# Patient Record
Sex: FEMALE | Race: WHITE | Employment: OTHER | URBAN - METROPOLITAN AREA
[De-identification: names, ages, dates, MRNs, and addresses within clinical notes are randomized per-mention and may not be internally consistent; named-entity substitution may affect disease eponyms.]

---

## 2018-02-17 ENCOUNTER — APPOINTMENT (OUTPATIENT)
Dept: GENERAL RADIOLOGY | Age: 73
End: 2018-02-17
Attending: EMERGENCY MEDICINE
Payer: MEDICARE

## 2018-02-17 ENCOUNTER — HOSPITAL ENCOUNTER (EMERGENCY)
Age: 73
Discharge: HOME | End: 2018-02-17
Attending: EMERGENCY MEDICINE
Payer: MEDICARE

## 2018-02-17 VITALS
HEIGHT: 63 IN | SYSTOLIC BLOOD PRESSURE: 147 MMHG | OXYGEN SATURATION: 97 % | BODY MASS INDEX: 39.92 KG/M2 | TEMPERATURE: 98.8 F | WEIGHT: 225.31 LBS | DIASTOLIC BLOOD PRESSURE: 65 MMHG | RESPIRATION RATE: 16 BRPM | HEART RATE: 104 BPM

## 2018-02-17 DIAGNOSIS — R68.89 SENSATION OF SWOLLEN THROAT: Primary | ICD-10-CM

## 2018-02-17 PROCEDURE — 70360 X-RAY EXAM OF NECK: CPT

## 2018-02-17 PROCEDURE — 99283 EMERGENCY DEPT VISIT LOW MDM: CPT

## 2018-02-17 PROCEDURE — 74011000250 HC RX REV CODE- 250: Performed by: EMERGENCY MEDICINE

## 2018-02-17 PROCEDURE — 94762 N-INVAS EAR/PLS OXIMTRY CONT: CPT

## 2018-02-17 PROCEDURE — 99284 EMERGENCY DEPT VISIT MOD MDM: CPT

## 2018-02-17 PROCEDURE — 74011250637 HC RX REV CODE- 250/637: Performed by: EMERGENCY MEDICINE

## 2018-02-17 RX ORDER — DEXAMETHASONE SODIUM PHOSPHATE 4 MG/ML
10 INJECTION, SOLUTION INTRA-ARTICULAR; INTRALESIONAL; INTRAMUSCULAR; INTRAVENOUS; SOFT TISSUE
Status: COMPLETED | OUTPATIENT
Start: 2018-02-17 | End: 2018-02-17

## 2018-02-17 RX ORDER — PREDNISONE 10 MG/1
30 TABLET ORAL DAILY
Qty: 9 TAB | Refills: 0 | Status: SHIPPED | OUTPATIENT
Start: 2018-02-17 | End: 2018-02-20

## 2018-02-17 RX ORDER — LIDOCAINE HYDROCHLORIDE 20 MG/ML
15 SOLUTION OROPHARYNGEAL
Status: COMPLETED | OUTPATIENT
Start: 2018-02-17 | End: 2018-02-17

## 2018-02-17 RX ADMIN — ALUMINUM HYDROXIDE AND MAGNESIUM HYDROXIDE 30 ML: 200; 200 SUSPENSION ORAL at 22:16

## 2018-02-17 RX ADMIN — DEXAMETHASONE SODIUM PHOSPHATE 10 MG: 4 INJECTION, SOLUTION INTRAMUSCULAR; INTRAVENOUS at 22:17

## 2018-02-17 RX ADMIN — LIDOCAINE HYDROCHLORIDE 15 ML: 20 SOLUTION ORAL; TOPICAL at 22:16

## 2018-02-18 NOTE — ED NOTES
Assumed care of pt. Per ED tech, pt refused to change into gown. Pt sitting on stretcher with daugher at bedside. Reports being started on cipro yesterday for UTI and has had 2 doses, one last night and one this morning. Reports son telling her that she is allergic to this drug. Pt reports accidentally consuming vinegar, by mistaking it for apple juice. Reports ever since consumption, her throat has been burning and feels swollen. Reports cough, which causes difficulty breathing.

## 2018-02-18 NOTE — DISCHARGE INSTRUCTIONS
Sore Throat: Care Instructions  Your Care Instructions    Infection by bacteria or a virus causes most sore throats. Cigarette smoke, dry air, air pollution, allergies, and yelling can also cause a sore throat. Sore throats can be painful and annoying. Fortunately, most sore throats go away on their own. If you have a bacterial infection, your doctor may prescribe antibiotics. Follow-up care is a key part of your treatment and safety. Be sure to make and go to all appointments, and call your doctor if you are having problems. It's also a good idea to know your test results and keep a list of the medicines you take. How can you care for yourself at home? · If your doctor prescribed antibiotics, take them as directed. Do not stop taking them just because you feel better. You need to take the full course of antibiotics. · Gargle with warm salt water once an hour to help reduce swelling and relieve discomfort. Use 1 teaspoon of salt mixed in 1 cup of warm water. · Take an over-the-counter pain medicine, such as acetaminophen (Tylenol), ibuprofen (Advil, Motrin), or naproxen (Aleve). Read and follow all instructions on the label. · Be careful when taking over-the-counter cold or flu medicines and Tylenol at the same time. Many of these medicines have acetaminophen, which is Tylenol. Read the labels to make sure that you are not taking more than the recommended dose. Too much acetaminophen (Tylenol) can be harmful. · Drink plenty of fluids. Fluids may help soothe an irritated throat. Hot fluids, such as tea or soup, may help decrease throat pain. · Use over-the-counter throat lozenges to soothe pain. Regular cough drops or hard candy may also help. These should not be given to young children because of the risk of choking. · Do not smoke or allow others to smoke around you. If you need help quitting, talk to your doctor about stop-smoking programs and medicines.  These can increase your chances of quitting for good.  · Use a vaporizer or humidifier to add moisture to your bedroom. Follow the directions for cleaning the machine. When should you call for help? Call your doctor now or seek immediate medical care if:  ? · You have new or worse trouble swallowing. ? · Your sore throat gets much worse on one side. ? Watch closely for changes in your health, and be sure to contact your doctor if you do not get better as expected. Where can you learn more? Go to http://sandro-karla.info/. Enter [phone] in the search box to learn more about \"Sore Throat: Care Instructions. \"  Current as of: May 12, 2017  Content Version: 11.4  © 2130-8396 [employer]. Care instructions adapted under license by [employer] (which disclaims liability or warranty for this information).  If you have questions about a medical condition or this instruction, always ask your healthcare professional. [address] any warranty or liability for your use of this information.

## 2018-02-18 NOTE — ED NOTES
Dr. Corrinne Barbone at bedside to provide discharge paperwork. Vital signs stable. Pt in no apparent distress at this time. Mental status at baseline. Ambulatory to waiting room with steady gate, discharge paperwork in hand. Accompanied by daughter.

## 2018-02-18 NOTE — ED PROVIDER NOTES
EMERGENCY DEPARTMENT HISTORY AND PHYSICAL EXAM      Date: 2/17/2018  Patient Name: Tangela Wagoner    History of Presenting Illness     Chief Complaint   Patient presents with    Other       History Provided By: Patient and Patient's Daughter    HPI: Tangela Wagoner is a 81 y.o. female, pmhx of GERD, breast cancer, and HTN, who presents herself w/ daughter to the ED c/o constant, aching sore throat since tonight (2/17/18). Pt's daughter states that her mother accidentally consumed vinegar by mistaking it for apple juice. Pt states her throat is in a constant \"burning sensation\" and feels swollen. Pt reports of associated cough, nausea, vomiting, and BL ear pain since onset of symptoms. Per daughter, pt started on Cipro yesterday (2/16/18) for an UTI. She has had one dose yesterday and one this morning. Pt specifically denies any recent fevers, chills, diarrhea, abd pain, CP, SOB, urinary sxs, changes in BM, or headache. PCP: Olga Grijalva MD    Allergies: Codeine, hydrocodone, morphine, penicillin  PMHx: Significant for GERD, breast cancer, HTN  PSHx: Significant for breast lumpectomy, hysterectomy   Social Hx: -tobacco (former), -EtOH , -Illicit Drugs     There are no other complaints, changes, or physical findings at this time. Current Outpatient Prescriptions   Medication Sig Dispense Refill    predniSONE (DELTASONE) 10 mg tablet Take 3 Tabs by mouth daily for 3 doses. 9 Tab 0    multivitamin (ONE A DAY) tablet Take 1 tablet by mouth daily.  omeprazole (PRILOSEC) 20 mg capsule TAKE ONE CAPSULE BY MOUTH DAILY 30 Cap 0    KLOR-CON M20 20 mEq tablet TAKE ONE TABLET BY MOUTH EVERY DAY 30 Tab 4    rosuvastatin (CRESTOR) 10 mg tablet Take 1 Tab by mouth daily. 30 Tab 4    lisinopril (PRINIVIL, ZESTRIL) 10 mg tablet Take 1 Tab by mouth daily. 30 Tab 1    polyethylene glycol (MIRALAX) 17 gram/dose powder Take 17 g by mouth two (2) times a day.  510 g 3    acetaminophen (TYLENOL EXTRA STRENGTH) 500 mg tablet Take  by mouth every six (6) hours as needed.  butalbital-acetaminophen-caffeine (FIORICET, ESGIC) -40 mg per tablet Take 1 Tab by mouth every six (6) hours as needed for Pain. 30 Tab 0    aspirin (ASPIRIN) 325 mg tablet Take 325 mg by mouth daily. Past History     Past Medical History:  Past Medical History:   Diagnosis Date    Cancer [facility])     had breast cancer     Colon polyps 10/1/2010    Diverticulosis 10/1/2010    GERD (gastroesophageal reflux disease)     Hypertension     Unspecified sleep apnea        Past Surgical History:  Past Surgical History:   Procedure Laterality Date   Crockett Brenham  1/22/2015         ENDOSCOPY, COLON, DIAGNOSTIC      polyps removed    HX BREAST LUMPECTOMY  2000    left breast - chemo,surgery, radiation (partial mastectomy)    HX CHOLECYSTECTOMY      HX HYSTERECTOMY  2000    TAHBSO due to ?cancer - MCV    NE COLSC FLX W/RMVL OF TUMOR POLYP LESION SNARE TQ  3/8/2012            Family History:  Family History   Problem Relation Age of Onset    Cancer Mother      bone    Hypertension Mother     Diabetes Sister     Cancer Sister      pancreatic?  Diabetes Brother     Hypertension Father        Social History:  Social History   Substance Use Topics    Smoking status: Former Smoker     Packs/day: 0.00     Years: 20.00     Quit date: 10/1/2000    Smokeless tobacco: Never Used    Alcohol use No       Allergies: Allergies   Allergen Reactions    Codeine Nausea and Vomiting    Contrast Agent [Iodine] Hives and Itching    Hydrocodone Nausea and Vomiting    Morphine Other (comments)     hallucinations          Review of Systems   Review of Systems   Constitutional: Negative. Negative for fever. HENT: Positive for ear pain (BL) and sore throat.         +swollen throat   Eyes: Negative. Respiratory: Positive for cough. Negative for shortness of breath. Cardiovascular: Negative for chest pain. Gastrointestinal: Positive for nausea and vomiting. Negative for abdominal pain. Endocrine: Negative. Genitourinary: Negative. Negative for difficulty urinating, dysuria and hematuria. Musculoskeletal: Negative. Skin: Negative. Allergic/Immunologic: Negative. Neurological: Negative. Psychiatric/Behavioral: Negative for suicidal ideas. All other systems reviewed and are negative. Physical Exam   Physical Exam   Constitutional: She is oriented to person, place, and time. She appears well-developed and well-nourished. No distress. HENT:   Head: Normocephalic and atraumatic. Nose: Nose normal.   Mouth/Throat: Uvula is midline, oropharynx is clear and moist and mucous membranes are normal. No trismus in the jaw. No uvula swelling. No oropharyngeal exudate, posterior oropharyngeal edema, posterior oropharyngeal erythema or tonsillar abscesses. Eyes: Conjunctivae and EOM are normal. Pupils are equal, round, and reactive to light. No scleral icterus. Neck: Normal range of motion. No JVD present. No tracheal deviation present. Cardiovascular: Regular rhythm, normal heart sounds and intact distal pulses. Tachycardia present. Exam reveals no friction rub. No murmur heard. Pulmonary/Chest: Effort normal and breath sounds normal. No stridor. No respiratory distress. She has no wheezes. She has no rales. Abdominal: Soft. Bowel sounds are normal. She exhibits no distension. There is no tenderness. There is no rebound. Musculoskeletal: Normal range of motion. She exhibits no tenderness. Neurological: She is alert and oriented to person, place, and time. No cranial nerve deficit. Skin: Skin is warm and dry. No rash noted. She is not diaphoretic. Psychiatric: Her speech is normal and behavior is normal. Judgment and thought content normal. Her mood appears anxious. Cognition and memory are normal.   Nursing note and vitals reviewed.         Diagnostic Study Results     Radiologic Studies -   XR NECK SOFT TISSUE   Final Result   INDICATION: Swelling of the throat. Coughing. Swallowed vinegar. Allergic to  Cipro.     COMPARISON: None     AP and lateral views of the soft tissues of the neck demonstrate the epiglottis  to be normal. Aryepiglottic folds are normal. Prevertebral soft tissues are  normal. Tracheal air shadow is unremarkable. Adenoids are normal size.     IMPRESSION  IMPRESSION:  Normal neck for soft tissues. Medical Decision Making   I am the first provider for this patient. I reviewed the vital signs, available nursing notes, past medical history, past surgical history, family history and social history. Vital Signs-Reviewed the patient's vital signs. Patient Vitals for the past 12 hrs:   Temp Pulse Resp BP SpO2   02/17/18 2121 98.8 °F (37.1 °C) (!) 104 16 147/65 97 %       Pulse Oximetry Analysis - 97% on RA    Cardiac Monitor:   Rate: 104 bpm  Rhythm: Sinus Tachycardia      Records Reviewed: Nursing Notes and Old Medical Records    Provider Notes (Medical Decision Making):     DDX:  Throat irritation, sensation of throat closing    Plan:  Viscous lidocaine, decadron, maalox    Impression:  Sore throat, sensation of throat swelling    ED Course:   Initial assessment performed. The patients presenting problems have been discussed, and they are in agreement with the care plan formulated and outlined with them. I have encouraged them to ask questions as they arise throughout their visit. I reviewed our electronic medical record system for any past medical records that were available that may contribute to the patients current condition, the nursing notes and and vital signs from today's visit    Nursing notes will be reviewed as they become available in realtime while the pt has been in the ED. Serge Valdovinos MD    I personally reviewed pt's imaging. Official read by radiology listed below.   Serge Valdovinos MD    PROGRESS NOTE:  10:32 PM  Per nursing, pt is feeling much better after receiving medication. Continued to refuse to get into a gown. She and her daughter expressed her desire to go home. Written by Mickey Pina, ED Scribe, as dictated by Irasema Paniagua MD      Progress note:  Pt noted to be feeling better, ready for discharge. Discussed lab and imaging findings with pt and/or family, specifically noting negative xrays. Pt will follow up as instructed. All questions have been answered, pt voiced understanding and agreement with plan. If narcotics were prescribed, pt was advised not to drive or operate heavy machinery. If abx were prescribed, pt advised that diarrhea and rash are possible side effects of the medications. Specific return precautions provided in addition to instructions for pt to return to the ED immediately should sx worsen at any time. Irasema Paniagua MD      Critical Care Time: 0 min    PLAN:  1. Current Discharge Medication List      START taking these medications    Details   predniSONE (DELTASONE) 10 mg tablet Take 3 Tabs by mouth daily for 3 doses. Qty: 9 Tab, Refills: 0           2. Follow-up Information     Follow up With Details Comments [address] MD Schedule an appointment as soon as possible for a visit in 2 days  [facility] [address]  [phone]          Return to ED if worse     Discharge Note:  10:45 PM  The pt is ready for discharge. The pt's signs, symptoms, diagnosis, and discharge instructions have been discussed and pt has conveyed their understanding. The pt is to follow up as recommended or return to ER should their symptoms worsen. Plan has been discussed and pt is in agreement. Diagnosis     Clinical Impression:   1. Sensation of swollen throat        Attestations:     This note is prepared by Mickey Pina, acting as Scribe for MD Irasema Giron MD : The scribe's documentation has been prepared under my direction and personally reviewed by me in its entirety. I confirm that the note above accurately reflects all work, treatment, procedures, and medical decision making performed by me.            This note will not be viewable in [facility]

## 2019-07-02 ENCOUNTER — APPOINTMENT (OUTPATIENT)
Dept: CT IMAGING | Age: 74
End: 2019-07-02
Attending: EMERGENCY MEDICINE
Payer: MEDICARE

## 2019-07-02 ENCOUNTER — HOSPITAL ENCOUNTER (EMERGENCY)
Age: 74
Discharge: HOME | End: 2019-07-02
Attending: EMERGENCY MEDICINE
Payer: MEDICARE

## 2019-07-02 VITALS
HEART RATE: 78 BPM | TEMPERATURE: 98.8 F | HEIGHT: 62 IN | BODY MASS INDEX: 40.73 KG/M2 | DIASTOLIC BLOOD PRESSURE: 71 MMHG | WEIGHT: 221.34 LBS | OXYGEN SATURATION: 98 % | SYSTOLIC BLOOD PRESSURE: 128 MMHG | RESPIRATION RATE: 16 BRPM

## 2019-07-02 DIAGNOSIS — R11.10 ACUTE VOMITING: ICD-10-CM

## 2019-07-02 DIAGNOSIS — N28.9 KIDNEY LESION: Primary | ICD-10-CM

## 2019-07-02 DIAGNOSIS — R10.9 ACUTE RIGHT FLANK PAIN: ICD-10-CM

## 2019-07-02 DIAGNOSIS — I10 ACCELERATED HYPERTENSION: ICD-10-CM

## 2019-07-02 LAB
ALBUMIN SERPL-MCNC: 3.7 G/DL (ref 3.5–5)
ALBUMIN/GLOB SERPL: 1.2 {RATIO} (ref 1.1–2.2)
ALP SERPL-CCNC: 60 U/L (ref 45–117)
ALT SERPL-CCNC: 32 U/L (ref 12–78)
ANION GAP SERPL CALC-SCNC: 5 MMOL/L (ref 5–15)
APPEARANCE UR: CLEAR
AST SERPL-CCNC: 22 U/L (ref 15–37)
BACTERIA URNS QL MICRO: NEGATIVE /HPF
BASOPHILS # BLD: 0 K/UL (ref 0–0.1)
BASOPHILS NFR BLD: 1 % (ref 0–1)
BILIRUB SERPL-MCNC: 0.5 MG/DL (ref 0.2–1)
BILIRUB UR QL: NEGATIVE
BUN SERPL-MCNC: 12 MG/DL (ref 6–20)
BUN/CREAT SERPL: 14 (ref 12–20)
CALCIUM SERPL-MCNC: 9.3 MG/DL (ref 8.5–10.1)
CHLORIDE SERPL-SCNC: 103 MMOL/L (ref 97–108)
CO2 SERPL-SCNC: 32 MMOL/L (ref 21–32)
COLOR UR: ABNORMAL
CREAT SERPL-MCNC: 0.85 MG/DL (ref 0.55–1.02)
DIFFERENTIAL METHOD BLD: ABNORMAL
EOSINOPHIL # BLD: 0.1 K/UL (ref 0–0.4)
EOSINOPHIL NFR BLD: 3 % (ref 0–7)
EPITH CASTS URNS QL MICRO: ABNORMAL /LPF
ERYTHROCYTE [DISTWIDTH] IN BLOOD BY AUTOMATED COUNT: 12.6 % (ref 11.5–14.5)
GLOBULIN SER CALC-MCNC: 3 G/DL (ref 2–4)
GLUCOSE SERPL-MCNC: 142 MG/DL (ref 65–100)
GLUCOSE UR STRIP.AUTO-MCNC: NEGATIVE MG/DL
HCT VFR BLD AUTO: 40.8 % (ref 35–47)
HGB BLD-MCNC: 13.6 G/DL (ref 11.5–16)
HGB UR QL STRIP: NEGATIVE
IMM GRANULOCYTES # BLD AUTO: 0 K/UL (ref 0–0.04)
IMM GRANULOCYTES NFR BLD AUTO: 0 % (ref 0–0.5)
KETONES UR QL STRIP.AUTO: NEGATIVE MG/DL
LEUKOCYTE ESTERASE UR QL STRIP.AUTO: ABNORMAL
LYMPHOCYTES # BLD: 1 K/UL (ref 0.8–3.5)
LYMPHOCYTES NFR BLD: 30 % (ref 12–49)
MCH RBC QN AUTO: 29.7 PG (ref 26–34)
MCHC RBC AUTO-ENTMCNC: 33.3 G/DL (ref 30–36.5)
MCV RBC AUTO: 89.1 FL (ref 80–99)
MONOCYTES # BLD: 0.3 K/UL (ref 0–1)
MONOCYTES NFR BLD: 8 % (ref 5–13)
NEUTS SEG # BLD: 1.9 K/UL (ref 1.8–8)
NEUTS SEG NFR BLD: 58 % (ref 32–75)
NITRITE UR QL STRIP.AUTO: NEGATIVE
NRBC # BLD: 0 K/UL (ref 0–0.01)
NRBC BLD-RTO: 0 PER 100 WBC
PH UR STRIP: 7 [PH] (ref 5–8)
PLATELET # BLD AUTO: 163 K/UL (ref 150–400)
PMV BLD AUTO: 10.7 FL (ref 8.9–12.9)
POTASSIUM SERPL-SCNC: 3.6 MMOL/L (ref 3.5–5.1)
PROT SERPL-MCNC: 6.7 G/DL (ref 6.4–8.2)
PROT UR STRIP-MCNC: NEGATIVE MG/DL
RBC # BLD AUTO: 4.58 M/UL (ref 3.8–5.2)
RBC #/AREA URNS HPF: ABNORMAL /HPF (ref 0–5)
SODIUM SERPL-SCNC: 140 MMOL/L (ref 136–145)
SP GR UR REFRACTOMETRY: 1.01 (ref 1–1.03)
UA: UC IF INDICATED,UAUC: ABNORMAL
UROBILINOGEN UR QL STRIP.AUTO: 0.2 EU/DL (ref 0.2–1)
WBC # BLD AUTO: 3.3 K/UL (ref 3.6–11)
WBC URNS QL MICRO: ABNORMAL /HPF (ref 0–4)

## 2019-07-02 PROCEDURE — 74011250636 HC RX REV CODE- 250/636: Performed by: EMERGENCY MEDICINE

## 2019-07-02 PROCEDURE — 96374 THER/PROPH/DIAG INJ IV PUSH: CPT

## 2019-07-02 PROCEDURE — 74176 CT ABD & PELVIS W/O CONTRAST: CPT

## 2019-07-02 PROCEDURE — 96375 TX/PRO/DX INJ NEW DRUG ADDON: CPT

## 2019-07-02 PROCEDURE — 85025 COMPLETE CBC W/AUTO DIFF WBC: CPT

## 2019-07-02 PROCEDURE — 99284 EMERGENCY DEPT VISIT MOD MDM: CPT

## 2019-07-02 PROCEDURE — 80053 COMPREHEN METABOLIC PANEL: CPT

## 2019-07-02 PROCEDURE — 36415 COLL VENOUS BLD VENIPUNCTURE: CPT

## 2019-07-02 PROCEDURE — 81001 URINALYSIS AUTO W/SCOPE: CPT

## 2019-07-02 RX ORDER — KETOROLAC TROMETHAMINE 30 MG/ML
15 INJECTION, SOLUTION INTRAMUSCULAR; INTRAVENOUS
Status: COMPLETED | OUTPATIENT
Start: 2019-07-02 | End: 2019-07-02

## 2019-07-02 RX ORDER — DICLOFENAC SODIUM 75 MG/1
75 TABLET, DELAYED RELEASE ORAL 2 TIMES DAILY
Qty: 20 TAB | Refills: 0 | Status: SHIPPED | OUTPATIENT
Start: 2019-07-02 | End: 2022-04-03

## 2019-07-02 RX ORDER — ONDANSETRON 2 MG/ML
4 INJECTION INTRAMUSCULAR; INTRAVENOUS
Status: COMPLETED | OUTPATIENT
Start: 2019-07-02 | End: 2019-07-02

## 2019-07-02 RX ORDER — ONDANSETRON 4 MG/1
4 TABLET, ORALLY DISINTEGRATING ORAL
Qty: 20 TAB | Refills: 0 | Status: SHIPPED | OUTPATIENT
Start: 2019-07-02 | End: 2022-04-03 | Stop reason: DRUGHIGH

## 2019-07-02 RX ADMIN — ONDANSETRON 4 MG: 2 INJECTION INTRAMUSCULAR; INTRAVENOUS at 12:53

## 2019-07-02 RX ADMIN — KETOROLAC TROMETHAMINE 15 MG: 30 INJECTION, SOLUTION INTRAMUSCULAR at 12:53

## 2019-11-11 ENCOUNTER — ANESTHESIA EVENT (OUTPATIENT)
Dept: ENDOSCOPY | Age: 74
End: 2019-11-11
Payer: MEDICARE

## 2019-11-12 ENCOUNTER — HOSPITAL ENCOUNTER (OUTPATIENT)
Age: 74
Setting detail: OUTPATIENT SURGERY
Discharge: HOME | End: 2019-11-12
Attending: INTERNAL MEDICINE | Admitting: INTERNAL MEDICINE
Payer: MEDICARE

## 2019-11-12 ENCOUNTER — ANESTHESIA (OUTPATIENT)
Dept: ENDOSCOPY | Age: 74
End: 2019-11-12
Payer: MEDICARE

## 2019-11-12 VITALS
RESPIRATION RATE: 16 BRPM | WEIGHT: 224.5 LBS | BODY MASS INDEX: 42.39 KG/M2 | HEIGHT: 61 IN | HEART RATE: 71 BPM | DIASTOLIC BLOOD PRESSURE: 60 MMHG | SYSTOLIC BLOOD PRESSURE: 144 MMHG | OXYGEN SATURATION: 99 % | TEMPERATURE: 97.6 F

## 2019-11-12 PROCEDURE — 74011250636 HC RX REV CODE- 250/636: Performed by: NURSE ANESTHETIST, CERTIFIED REGISTERED

## 2019-11-12 PROCEDURE — 77030013992 HC SNR POLYP ENDOSC BSC -B: Performed by: INTERNAL MEDICINE

## 2019-11-12 PROCEDURE — 88305 TISSUE EXAM BY PATHOLOGIST: CPT

## 2019-11-12 PROCEDURE — 74011250636 HC RX REV CODE- 250/636: Performed by: INTERNAL MEDICINE

## 2019-11-12 PROCEDURE — 76040000019: Performed by: INTERNAL MEDICINE

## 2019-11-12 PROCEDURE — 74011250637 HC RX REV CODE- 250/637: Performed by: INTERNAL MEDICINE

## 2019-11-12 PROCEDURE — 76060000031 HC ANESTHESIA FIRST 0.5 HR: Performed by: INTERNAL MEDICINE

## 2019-11-12 PROCEDURE — 74011000250 HC RX REV CODE- 250: Performed by: NURSE ANESTHETIST, CERTIFIED REGISTERED

## 2019-11-12 RX ORDER — FLUMAZENIL 0.1 MG/ML
0.2 INJECTION INTRAVENOUS
Status: DISCONTINUED | OUTPATIENT
Start: 2019-11-12 | End: 2019-11-12 | Stop reason: HOSPADM

## 2019-11-12 RX ORDER — FENTANYL CITRATE 50 UG/ML
25 INJECTION, SOLUTION INTRAMUSCULAR; INTRAVENOUS
Status: DISCONTINUED | OUTPATIENT
Start: 2019-11-12 | End: 2019-11-12 | Stop reason: HOSPADM

## 2019-11-12 RX ORDER — SODIUM CHLORIDE 0.9 % (FLUSH) 0.9 %
5-40 SYRINGE (ML) INJECTION AS NEEDED
Status: DISCONTINUED | OUTPATIENT
Start: 2019-11-12 | End: 2019-11-12 | Stop reason: HOSPADM

## 2019-11-12 RX ORDER — ATROPINE SULFATE 0.1 MG/ML
0.5 INJECTION INTRAVENOUS
Status: DISCONTINUED | OUTPATIENT
Start: 2019-11-12 | End: 2019-11-12 | Stop reason: HOSPADM

## 2019-11-12 RX ORDER — EPINEPHRINE 0.1 MG/ML
1 INJECTION INTRACARDIAC; INTRAVENOUS
Status: DISCONTINUED | OUTPATIENT
Start: 2019-11-12 | End: 2019-11-12 | Stop reason: HOSPADM

## 2019-11-12 RX ORDER — NALOXONE HYDROCHLORIDE 0.4 MG/ML
0.4 INJECTION, SOLUTION INTRAMUSCULAR; INTRAVENOUS; SUBCUTANEOUS
Status: DISCONTINUED | OUTPATIENT
Start: 2019-11-12 | End: 2019-11-12 | Stop reason: HOSPADM

## 2019-11-12 RX ORDER — MIDAZOLAM HYDROCHLORIDE 1 MG/ML
.25-5 INJECTION, SOLUTION INTRAMUSCULAR; INTRAVENOUS
Status: DISCONTINUED | OUTPATIENT
Start: 2019-11-12 | End: 2019-11-12 | Stop reason: HOSPADM

## 2019-11-12 RX ORDER — DEXTROMETHORPHAN/PSEUDOEPHED 2.5-7.5/.8
1.2 DROPS ORAL
Status: DISCONTINUED | OUTPATIENT
Start: 2019-11-12 | End: 2019-11-12 | Stop reason: HOSPADM

## 2019-11-12 RX ORDER — LIDOCAINE HYDROCHLORIDE 20 MG/ML
INJECTION, SOLUTION EPIDURAL; INFILTRATION; INTRACAUDAL; PERINEURAL AS NEEDED
Status: DISCONTINUED | OUTPATIENT
Start: 2019-11-12 | End: 2019-11-12 | Stop reason: HOSPADM

## 2019-11-12 RX ORDER — SODIUM CHLORIDE 0.9 % (FLUSH) 0.9 %
5-40 SYRINGE (ML) INJECTION EVERY 8 HOURS
Status: DISCONTINUED | OUTPATIENT
Start: 2019-11-12 | End: 2019-11-12 | Stop reason: HOSPADM

## 2019-11-12 RX ORDER — SODIUM CHLORIDE 9 MG/ML
75 INJECTION, SOLUTION INTRAVENOUS CONTINUOUS
Status: DISCONTINUED | OUTPATIENT
Start: 2019-11-12 | End: 2019-11-12 | Stop reason: HOSPADM

## 2019-11-12 RX ORDER — PROPOFOL 10 MG/ML
INJECTION, EMULSION INTRAVENOUS AS NEEDED
Status: DISCONTINUED | OUTPATIENT
Start: 2019-11-12 | End: 2019-11-12 | Stop reason: HOSPADM

## 2019-11-12 RX ADMIN — PROPOFOL 30 MG: 10 INJECTION, EMULSION INTRAVENOUS at 10:55

## 2019-11-12 RX ADMIN — SIMETHICONE 80 MG: 20 SUSPENSION/ DROPS ORAL at 10:56

## 2019-11-12 RX ADMIN — PROPOFOL 30 MG: 10 INJECTION, EMULSION INTRAVENOUS at 10:57

## 2019-11-12 RX ADMIN — PROPOFOL 70 MG: 10 INJECTION, EMULSION INTRAVENOUS at 10:46

## 2019-11-12 RX ADMIN — PROPOFOL 30 MG: 10 INJECTION, EMULSION INTRAVENOUS at 10:53

## 2019-11-12 RX ADMIN — PROPOFOL 30 MG: 10 INJECTION, EMULSION INTRAVENOUS at 11:00

## 2019-11-12 RX ADMIN — SODIUM CHLORIDE 75 ML/HR: 900 INJECTION, SOLUTION INTRAVENOUS at 10:22

## 2019-11-12 RX ADMIN — LIDOCAINE HYDROCHLORIDE 100 MG: 20 INJECTION, SOLUTION EPIDURAL; INFILTRATION; INTRACAUDAL; PERINEURAL at 10:46

## 2019-11-12 RX ADMIN — PROPOFOL 30 MG: 10 INJECTION, EMULSION INTRAVENOUS at 10:50

## 2019-11-12 RX ADMIN — PROPOFOL 20 MG: 10 INJECTION, EMULSION INTRAVENOUS at 10:48

## 2019-11-12 RX ADMIN — PROPOFOL 20 MG: 10 INJECTION, EMULSION INTRAVENOUS at 11:05

## 2019-11-12 NOTE — ROUTINE PROCESS
Gui Valdez 1945 
[id number] Situation: 
Verbal report received from: DELMY Wolfe Procedure: Procedure(s): 
COLONOSCOPY 
ENDOSCOPIC POLYPECTOMY Background: 
 
Preoperative diagnosis: LEFT LOWER QUADRANT PAIN, FAMILY HX OF CANCER OF GI TRACT, HX OF COLON POLYPS Postoperative diagnosis: Diverticulosis and colon polyps x 2 :  Dr. Quintin Cm Assistant(s): Endoscopy Technician-1: Patito Ramos Endoscopy RN-1: Gaby Velasquez RN Specimens:  
ID Type Source Tests Collected by Time Destination 1 : Cecum polyp Preservative Cecum  Saqib Claros MD 11/12/2019 1053 Pathology 2 : rectal polyp Preservative   Saqib Claros MD 11/12/2019 1103 Pathology H. Pylori  no Assessment: 
Intra-procedure medications Anesthesia gave intra-procedure sedation and medications, see anesthesia flow sheet yes Intravenous fluids: NS@ [place] Vital signs stable  yes Abdominal assessment: round and soft  yes Recommendation: 
Discharge patient per MD order   yes. Family or Hugh Farrow, son Permission to share finding with family or friend yes

## 2019-11-12 NOTE — ANESTHESIA POSTPROCEDURE EVALUATION
Procedure(s):  COLONOSCOPY  ENDOSCOPIC POLYPECTOMY. general, total IV anesthesia    Anesthesia Post Evaluation        Patient location during evaluation: PACU  Note status: Adequate. Level of consciousness: responsive to verbal stimuli and sleepy but conscious  Pain management: satisfactory to patient  Airway patency: patent  Anesthetic complications: no  Cardiovascular status: acceptable  Respiratory status: acceptable  Hydration status: acceptable  Comments: +Post-Anesthesia Evaluation and Assessment    Patient: Yeyo Graham MRN: [id number]  SSN: [id number]   YOB: 1945  Age: 72 y.o. Sex: female      Cardiovascular Function/Vital Signs    /60   Pulse 71   Temp 36.4 °C (97.6 °F)   Resp 16   Ht 5' 1\" (1.549 m)   Wt 101.8 kg (224 lb 8 oz)   SpO2 99%   Breastfeeding? No   BMI 42.42 kg/m²     Patient is status post Procedure(s):  COLONOSCOPY  ENDOSCOPIC POLYPECTOMY. Nausea/Vomiting: Controlled. Postoperative hydration reviewed and adequate. Pain:  Pain Scale 1: Numeric (0 - 10) (11/12/19 1135)  Pain Intensity 1: 0 (11/12/19 1135)   Managed. Neurological Status: At baseline. Mental Status and Level of Consciousness: Arousable. Pulmonary Status:   O2 Device: Room air (11/12/19 1135)   Adequate oxygenation and airway patent. Complications related to anesthesia: None    Post-anesthesia assessment completed. No concerns. Signed By: Silvia Palm MD    11/12/2019  Post anesthesia nausea and vomiting:  controlled      Vitals Value Taken Time   /60 11/12/2019 11:35 AM   Temp 36.4 °C (97.6 °F) 11/12/2019 11:18 AM   Pulse 72 11/12/2019 11:35 AM   Resp 16 11/12/2019 11:35 AM   SpO2 99 % 11/12/2019 11:35 AM   Vitals shown include unvalidated device data.

## 2019-11-12 NOTE — H&P
Gastroenterology Outpatient History and Physical    Patient: Stacy Saurav    Physician: Madeline Carr MD    Chief Complaint: LLQ pain. History of Present Illness: 79yo F with LLQ pain in setting of known diverticulosis. Infl[facility] reported in 2018 and [facility]. Note pers hx colon adenomatous polyp 2015 and fam hx CRC in son    History:  Past Medical History:   Diagnosis Date    Cancer [facility])     had breast cancer     Colon polyps 10/1/2010    Diverticulosis 10/1/2010    GERD (gastroesophageal reflux disease)     Hypertension     Unspecified sleep apnea       Past Surgical History:   Procedure Laterality Date   Elba Appl  2015         ENDOSCOPY, COLON, DIAGNOSTIC      polyps removed    HX BREAST LUMPECTOMY      left breast - chemo,surgery, radiation (partial mastectomy)    HX CHOLECYSTECTOMY      HX HYSTERECTOMY      TAHBSO due to ?cancer - [facility]    SC COLSC FLX W/RMVL OF TUMOR POLYP LESION SNARE TQ  3/8/2012           Social History     Socioeconomic History    Marital status:      Spouse name: Not on file    Number of children: Not on file    Years of education: Not on file    Highest education level: Not on file   Tobacco Use    Smoking status: Former Smoker     Packs/day: 0.00     Years: 20.00     Pack years: 0.00     Last attempt to quit: 10/1/2000     Years since quittin.1    Smokeless tobacco: Never Used   Substance and Sexual Activity    Alcohol use: No    Drug use: No      Family History   Problem Relation Age of Onset    Cancer Mother         bone    Hypertension Mother     Diabetes Sister     Cancer Sister         pancreatic?  Diabetes Brother     Hypertension Father       Patient Active Problem List   Diagnosis Code    Colon polyps K63.5    Diverticulosis K57.90    HTN (hypertension) I10    GERD (gastroesophageal reflux disease) K21.9    Dyslipidemia E78.5    Hypokalemia E87.6       Allergies:    Allergies   Allergen Reactions  Codeine Nausea and Vomiting    Contrast Agent [Iodine] Hives and Itching    Hydrocodone Nausea and Vomiting    Morphine Other (comments)     hallucinations      Medications:   Prior to Admission medications    Medication Sig Start Date End Date Taking? Authorizing Provider   diclofenac EC (VOLTAREN) 75 mg EC tablet Take 1 Tab by mouth two (2) times a day. 7/2/19  Yes Anil Bell MD   multivitamin (ONE A DAY) tablet Take 1 tablet by mouth daily. Yes Provider, Historical   omeprazole (PRILOSEC) 20 mg capsule TAKE ONE CAPSULE BY MOUTH DAILY 7/19/14  Yes Dre Vasquez MD   KLOR-CON M20 20 mEq tablet TAKE ONE TABLET BY MOUTH EVERY DAY 7/1/13  Yes Dre Vasquez MD   rosuvastatin (CRESTOR) 10 mg tablet Take 1 Tab by mouth daily. 6/3/13  Yes Dre Vasquez MD   lisinopril (PRINIVIL, ZESTRIL) 10 mg tablet Take 1 Tab by mouth daily. 6/3/13  Yes Dre Vasquez MD   polyethylene glycol [facility]) 17 gram/dose powder Take 17 g by mouth two (2) times a day. 4/16/13  Yes Dre Vasquez MD   acetaminophen (TYLENOL EXTRA STRENGTH) 500 mg tablet Take  by mouth every six (6) hours as needed. Yes Provider, Historical   butalbital-acetaminophen-caffeine (FIORICET, ESGIC) -40 mg per tablet Take 1 Tab by mouth every six (6) hours as needed for Pain. 7/17/12  Yes Dre Vasquez MD   aspirin (ASPIRIN) 325 mg tablet Take 325 mg by mouth daily. Yes Provider, Historical   ondansetron (ZOFRAN ODT) 4 mg disintegrating tablet Take 1 Tab by mouth every eight (8) hours as needed for Nausea. 7/2/19   Anli Bell MD     Physical Exam:   Vital Signs: Blood pressure 179/76, pulse 87, temperature 98.1 °F (36.7 °C), resp. rate 21, height 5' 1\" (1.549 m), weight 101.8 kg (224 lb 8 oz), SpO2 98 %, not currently breastfeeding.   General: well developed, well nourished   HEENT: unremarkable   Heart: regular rhythm no mumur    Lungs: clear   Abdominal:  benign   Neurological: unremarkable   Extremities: no edema     Findings/Diagnosis: LLQ pain  Plan of Care/Planned Procedure: colonoscopy with conscious/deep sedation    Signed:  Jonnie Menjivar MD 11/12/2019

## 2019-11-12 NOTE — PROCEDURES
NAME:  Larry Prabhakar   :   1945   MRN:   [id number]     Date/Time:  2019 11:10 AM    Colonoscopy Operative Report    Procedure Type:   Colonoscopy with polypectomy (cold snare), polypectomy (snare cautery)     Indications:     Abdominal pain, LLQ  Pre-operative Diagnosis: see indication above  Post-operative Diagnosis:  See findings below  :  Shilpi Moctezuma MD  Referring Provider: --Unknown, Provider    Exam:  Airway: clear, no airway problems anticipated  Heart: RRR, without gallops or rubs  Lungs: clear bilaterally without wheezes, crackles, or rhonchi  Abdomen: soft, nontender, nondistended, bowel sounds present  Mental Status: awake, alert and oriented to person, place and time    Sedation:  MAC anesthesia Propofol 260mg IV  Procedure Details:  After informed consent was obtained with all risks and benefits of procedure explained and preoperative exam completed, the patient was taken to the endoscopy suite and placed in the left lateral decubitus position. Upon sequential sedation as per above, a digital rectal exam was performed demonstrating internal hemorrhoids. The Olympus videocolonoscope  was inserted in the rectum and carefully advanced to the cecum, which was identified by the ileocecal valve and appendiceal orifice. The quality of preparation was adequate. The colonoscope was slowly withdrawn with careful evaluation between folds. Retroflexion in the rectum was completed demonstrating internal hemorrhoids. Findings:     -Sigmoid diverticulosis  -Single diminutive 2mm sessile polyp in cecum at 90cm; removed with cold snare  -Single 5mm sessile polyp in rectum at 15cm; removed with hot snare cautery  -Small grade 1 internal hemorrhoids    Specimen Removed:  #1 cecal polyp; #2 rectal polyp  Complications: None. EBL:  None.     Impression:    -Sigmoid diverticulosis  -Single diminutive 2mm sessile polyp in cecum at 90cm; removed with cold snare  -Single 5mm sessile polyp in rectum at 15cm; removed with hot snare cautery  -Small grade 1 internal hemorrhoids     Recommendations: --Await pathology. , -Repeat colonoscopy in 3 years given family history of colon cancer. . High fiber diet. Resume normal medication(s). You will receive a letter about the biopsy results in about 10 days. You may be asked to call your doctor's office for the results. Discharge Disposition:  Home in the company of a  when able to ambulate.     Kai Guerin MD

## 2019-11-12 NOTE — ANESTHESIA PREPROCEDURE EVALUATION
Anesthetic History   No history of anesthetic complications            Review of Systems / Medical History  Patient summary reviewed, nursing notes reviewed and pertinent labs reviewed    Pulmonary          Undiagnosed apnea and smoker      Comments: Former smoker   Neuro/Psych   Within defined limits           Cardiovascular    Hypertension: well controlled          Hyperlipidemia    Exercise tolerance: <4 METS     GI/Hepatic/Renal     GERD: well controlled           Endo/Other        Morbid obesity and cancer     Other Findings   Comments: Breast cancer  Colon polyps   Diverticulosis         Physical Exam    Airway  Mallampati: III  TM Distance: 4 - 6 cm  Neck ROM: normal range of motion   Mouth opening: Normal     Cardiovascular  Regular rate and rhythm,  S1 and S2 normal,  no murmur, click, rub, or gallop  Rhythm: regular  Rate: normal         Dental    Dentition: Upper partial plate and Poor dentition     Pulmonary      Decreased breath sounds: bibasilar           Abdominal  GI exam deferred       Other Findings            Anesthetic Plan    ASA: 3  Anesthesia type: general and total IV anesthesia          Induction: Intravenous  Anesthetic plan and risks discussed with: Patient

## 2019-11-12 NOTE — DISCHARGE INSTRUCTIONS
Surekha Benitez  [id number]  1945    COLON DISCHARGE INSTRUCTIONS  Discomfort:  Redness at IV site- apply warm compress to area; if redness or soreness persist- contact your physician  There may be a slight amount of blood passed from the rectum  Gaseous discomfort- walking, belching will help relieve any discomfort  You may not operate a vehicle for 12 hours  You may not engage in an occupation involving machinery or appliances for rest of today  You may not drink alcoholic beverages for at least 12 hours  Avoid making any critical decisions for at least 24 hour  DIET:   High fiber diet. - however -  remember your colon is empty and a heavy meal will produce gas. Avoid these foods:  vegetables, fried / greasy foods, carbonated drinks for today  MEDICATION:         ACTIVITY:  You may not resume your normal daily activities until tomorrow AM; it is recommended that you spend the remainder of the day resting -  avoid any strenuous activity. CALL M.D.   ANY SIGN OF:   Increasing pain, nausea, vomiting  Abdominal distension (swelling)  New increased bleeding (oral or rectal)  Fever (chills)  Pain in chest area  Bloody discharge from nose or mouth  Shortness of breath    IMPRESSION:  -Sigmoid diverticulosis  -Single diminutive 2mm sessile polyp in cecum at 90cm; removed with cold snare  -Single 5mm sessile polyp in rectum at 15cm; removed with hot snare cautery  -Small grade 1 internal hemorrhoids    Follow-up Instructions:   Call Dr. Roxanne Moise for the results of procedure / biopsy in 7-10 days  Telephone # [phone]  Repeat colonoscopy in 3 years given family history of colon cancer    Gail De La Rosa MD

## 2019-11-12 NOTE — PERIOP NOTES
Endoscope was pre-cleaned at the bedside immediately following procedure by Nereida Valverde    Anesthesia reports 260mg Propofol, 100mg Lidocaine and 700mL NS given during procedure. Received report from anesthesia staff on vital signs and status of patient. Lala Ramsey

## 2022-01-19 ENCOUNTER — APPOINTMENT (OUTPATIENT)
Dept: GENERAL RADIOLOGY | Age: 77
End: 2022-01-19
Attending: STUDENT IN AN ORGANIZED HEALTH CARE EDUCATION/TRAINING PROGRAM
Payer: MEDICARE

## 2022-01-19 ENCOUNTER — APPOINTMENT (OUTPATIENT)
Dept: GENERAL RADIOLOGY | Age: 77
End: 2022-01-19
Attending: INTERNAL MEDICINE
Payer: MEDICARE

## 2022-01-19 ENCOUNTER — HOSPITAL ENCOUNTER (OUTPATIENT)
Age: 77
Setting detail: OBSERVATION
Discharge: HOME HEALTH | End: 2022-01-22
Attending: STUDENT IN AN ORGANIZED HEALTH CARE EDUCATION/TRAINING PROGRAM | Admitting: INTERNAL MEDICINE
Payer: MEDICARE

## 2022-01-19 DIAGNOSIS — S42.441A CLOSED DISPLACED FRACTURE OF MEDIAL EPICONDYLE OF RIGHT HUMERUS, UNSPECIFIED FRACTURE MORPHOLOGY, INITIAL ENCOUNTER: ICD-10-CM

## 2022-01-19 DIAGNOSIS — M84.421A PATHOLOGICAL FRACTURE OF RIGHT HUMERUS, UNSPECIFIED PATHOLOGICAL CAUSE, INITIAL ENCOUNTER: Primary | ICD-10-CM

## 2022-01-19 PROBLEM — S42.301A RIGHT HUMERAL FRACTURE: Status: ACTIVE | Noted: 2022-01-19

## 2022-01-19 LAB
ABO + RH BLD: NORMAL
ALBUMIN SERPL-MCNC: 2.8 G/DL (ref 3.5–5)
ALBUMIN/GLOB SERPL: 0.7 {RATIO} (ref 1.1–2.2)
ALP SERPL-CCNC: 50 U/L (ref 45–117)
ALT SERPL-CCNC: 30 U/L (ref 12–78)
ANION GAP SERPL CALC-SCNC: 6 MMOL/L (ref 5–15)
AST SERPL W P-5'-P-CCNC: 33 U/L (ref 15–37)
BASOPHILS # BLD: 0 K/UL (ref 0–0.1)
BASOPHILS NFR BLD: 0 % (ref 0–1)
BILIRUB SERPL-MCNC: 0.5 MG/DL (ref 0.2–1)
BLOOD GROUP ANTIBODIES SERPL: NEGATIVE
BUN SERPL-MCNC: 19 MG/DL (ref 6–20)
BUN/CREAT SERPL: 22 (ref 12–20)
CA-I BLD-MCNC: 9.3 MG/DL (ref 8.5–10.1)
CHLORIDE SERPL-SCNC: 105 MMOL/L (ref 97–108)
CO2 SERPL-SCNC: 28 MMOL/L (ref 21–32)
CREAT SERPL-MCNC: 0.88 MG/DL (ref 0.55–1.02)
DIFFERENTIAL METHOD BLD: ABNORMAL
EOSINOPHIL # BLD: 0 K/UL (ref 0–0.4)
EOSINOPHIL NFR BLD: 0 % (ref 0–7)
ERYTHROCYTE [DISTWIDTH] IN BLOOD BY AUTOMATED COUNT: 18.1 % (ref 11.5–14.5)
GLOBULIN SER CALC-MCNC: 4 G/DL (ref 2–4)
GLUCOSE SERPL-MCNC: 132 MG/DL (ref 65–100)
HCT VFR BLD AUTO: 39.1 % (ref 35–47)
HGB BLD-MCNC: 12.5 G/DL (ref 11.5–16)
IMM GRANULOCYTES # BLD AUTO: 0 K/UL (ref 0–0.04)
IMM GRANULOCYTES NFR BLD AUTO: 0 % (ref 0–0.5)
INR PPP: 0.9 (ref 0.9–1.1)
LYMPHOCYTES # BLD: 0.5 K/UL (ref 0.8–3.5)
LYMPHOCYTES NFR BLD: 8 % (ref 12–49)
MCH RBC QN AUTO: 30.7 PG (ref 26–34)
MCHC RBC AUTO-ENTMCNC: 32 G/DL (ref 30–36.5)
MCV RBC AUTO: 96.1 FL (ref 80–99)
MONOCYTES # BLD: 0.5 K/UL (ref 0–1)
MONOCYTES NFR BLD: 8 % (ref 5–13)
NEUTS SEG # BLD: 4.8 K/UL (ref 1.8–8)
NEUTS SEG NFR BLD: 84 % (ref 32–75)
NRBC # BLD: 0 K/UL (ref 0–0.01)
NRBC BLD-RTO: 0 PER 100 WBC
PLATELET # BLD AUTO: 200 K/UL (ref 150–400)
PMV BLD AUTO: 10.5 FL (ref 8.9–12.9)
POTASSIUM SERPL-SCNC: 3.6 MMOL/L (ref 3.5–5.1)
PROT SERPL-MCNC: 6.8 G/DL (ref 6.4–8.2)
PROTHROMBIN TIME: 11.9 SEC (ref 11.9–14.7)
RBC # BLD AUTO: 4.07 M/UL (ref 3.8–5.2)
SODIUM SERPL-SCNC: 139 MMOL/L (ref 136–145)
SPECIMEN EXP DATE BLD: NORMAL
WBC # BLD AUTO: 5.8 K/UL (ref 3.6–11)

## 2022-01-19 PROCEDURE — 96376 TX/PRO/DX INJ SAME DRUG ADON: CPT

## 2022-01-19 PROCEDURE — 96374 THER/PROPH/DIAG INJ IV PUSH: CPT

## 2022-01-19 PROCEDURE — 85610 PROTHROMBIN TIME: CPT

## 2022-01-19 PROCEDURE — 85025 COMPLETE CBC W/AUTO DIFF WBC: CPT

## 2022-01-19 PROCEDURE — 71045 X-RAY EXAM CHEST 1 VIEW: CPT

## 2022-01-19 PROCEDURE — G0378 HOSPITAL OBSERVATION PER HR: HCPCS

## 2022-01-19 PROCEDURE — 86900 BLOOD TYPING SEROLOGIC ABO: CPT

## 2022-01-19 PROCEDURE — 96375 TX/PRO/DX INJ NEW DRUG ADDON: CPT

## 2022-01-19 PROCEDURE — 80053 COMPREHEN METABOLIC PANEL: CPT

## 2022-01-19 PROCEDURE — 99285 EMERGENCY DEPT VISIT HI MDM: CPT

## 2022-01-19 PROCEDURE — 74011250637 HC RX REV CODE- 250/637: Performed by: STUDENT IN AN ORGANIZED HEALTH CARE EDUCATION/TRAINING PROGRAM

## 2022-01-19 PROCEDURE — 74011250636 HC RX REV CODE- 250/636: Performed by: STUDENT IN AN ORGANIZED HEALTH CARE EDUCATION/TRAINING PROGRAM

## 2022-01-19 PROCEDURE — 74011250637 HC RX REV CODE- 250/637: Performed by: INTERNAL MEDICINE

## 2022-01-19 PROCEDURE — 36415 COLL VENOUS BLD VENIPUNCTURE: CPT

## 2022-01-19 PROCEDURE — 74011250636 HC RX REV CODE- 250/636: Performed by: INTERNAL MEDICINE

## 2022-01-19 PROCEDURE — 73030 X-RAY EXAM OF SHOULDER: CPT

## 2022-01-19 PROCEDURE — 73060 X-RAY EXAM OF HUMERUS: CPT

## 2022-01-19 PROCEDURE — 73090 X-RAY EXAM OF FOREARM: CPT

## 2022-01-19 PROCEDURE — 65270000029 HC RM PRIVATE

## 2022-01-19 RX ORDER — ONDANSETRON 2 MG/ML
4 INJECTION INTRAMUSCULAR; INTRAVENOUS
Status: DISCONTINUED | OUTPATIENT
Start: 2022-01-19 | End: 2022-01-22 | Stop reason: HOSPADM

## 2022-01-19 RX ORDER — ACETAMINOPHEN 650 MG/1
650 SUPPOSITORY RECTAL
Status: DISCONTINUED | OUTPATIENT
Start: 2022-01-19 | End: 2022-01-20

## 2022-01-19 RX ORDER — FENTANYL CITRATE 50 UG/ML
25 INJECTION, SOLUTION INTRAMUSCULAR; INTRAVENOUS
Status: DISPENSED | OUTPATIENT
Start: 2022-01-19 | End: 2022-01-20

## 2022-01-19 RX ORDER — OXYCODONE HYDROCHLORIDE 5 MG/1
5 TABLET ORAL
Status: DISCONTINUED | OUTPATIENT
Start: 2022-01-19 | End: 2022-01-22 | Stop reason: HOSPADM

## 2022-01-19 RX ORDER — POTASSIUM CHLORIDE 20 MEQ/1
20 TABLET, EXTENDED RELEASE ORAL DAILY
Status: DISCONTINUED | OUTPATIENT
Start: 2022-01-20 | End: 2022-01-22 | Stop reason: HOSPADM

## 2022-01-19 RX ORDER — LISINOPRIL 10 MG/1
10 TABLET ORAL DAILY
Status: DISCONTINUED | OUTPATIENT
Start: 2022-01-20 | End: 2022-01-22 | Stop reason: HOSPADM

## 2022-01-19 RX ORDER — MORPHINE SULFATE 4 MG/ML
4 INJECTION INTRAVENOUS ONCE
Status: COMPLETED | OUTPATIENT
Start: 2022-01-19 | End: 2022-01-19

## 2022-01-19 RX ORDER — KETOROLAC TROMETHAMINE 30 MG/ML
30 INJECTION, SOLUTION INTRAMUSCULAR; INTRAVENOUS
Status: COMPLETED | OUTPATIENT
Start: 2022-01-19 | End: 2022-01-19

## 2022-01-19 RX ORDER — METHOCARBAMOL 750 MG/1
1500 TABLET, FILM COATED ORAL ONCE
Status: COMPLETED | OUTPATIENT
Start: 2022-01-19 | End: 2022-01-19

## 2022-01-19 RX ORDER — ONDANSETRON 2 MG/ML
4 INJECTION INTRAMUSCULAR; INTRAVENOUS
Status: COMPLETED | OUTPATIENT
Start: 2022-01-19 | End: 2022-01-19

## 2022-01-19 RX ORDER — ATORVASTATIN CALCIUM 20 MG/1
20 TABLET, FILM COATED ORAL DAILY
Status: DISCONTINUED | OUTPATIENT
Start: 2022-01-20 | End: 2022-01-22 | Stop reason: HOSPADM

## 2022-01-19 RX ORDER — FENTANYL CITRATE 50 UG/ML
50 INJECTION, SOLUTION INTRAMUSCULAR; INTRAVENOUS
Status: COMPLETED | OUTPATIENT
Start: 2022-01-19 | End: 2022-01-19

## 2022-01-19 RX ORDER — ACETAMINOPHEN 325 MG/1
650 TABLET ORAL
Status: DISCONTINUED | OUTPATIENT
Start: 2022-01-19 | End: 2022-01-20

## 2022-01-19 RX ORDER — PANTOPRAZOLE SODIUM 20 MG/1
20 TABLET, DELAYED RELEASE ORAL DAILY
Status: DISCONTINUED | OUTPATIENT
Start: 2022-01-20 | End: 2022-01-22 | Stop reason: HOSPADM

## 2022-01-19 RX ORDER — POLYETHYLENE GLYCOL 3350 17 G/17G
17 POWDER, FOR SOLUTION ORAL DAILY PRN
Status: DISCONTINUED | OUTPATIENT
Start: 2022-01-19 | End: 2022-01-22 | Stop reason: HOSPADM

## 2022-01-19 RX ORDER — ENOXAPARIN SODIUM 100 MG/ML
40 INJECTION SUBCUTANEOUS DAILY
Status: DISCONTINUED | OUTPATIENT
Start: 2022-01-20 | End: 2022-01-21

## 2022-01-19 RX ORDER — SODIUM CHLORIDE 0.9 % (FLUSH) 0.9 %
5-40 SYRINGE (ML) INJECTION EVERY 8 HOURS
Status: DISCONTINUED | OUTPATIENT
Start: 2022-01-19 | End: 2022-01-22 | Stop reason: HOSPADM

## 2022-01-19 RX ORDER — ONDANSETRON 4 MG/1
4 TABLET, ORALLY DISINTEGRATING ORAL
Status: DISCONTINUED | OUTPATIENT
Start: 2022-01-19 | End: 2022-01-22 | Stop reason: HOSPADM

## 2022-01-19 RX ORDER — ASPIRIN 325 MG
325 TABLET ORAL DAILY
Status: DISCONTINUED | OUTPATIENT
Start: 2022-01-20 | End: 2022-01-22 | Stop reason: HOSPADM

## 2022-01-19 RX ORDER — SODIUM CHLORIDE 0.9 % (FLUSH) 0.9 %
5-40 SYRINGE (ML) INJECTION AS NEEDED
Status: DISCONTINUED | OUTPATIENT
Start: 2022-01-19 | End: 2022-01-22 | Stop reason: HOSPADM

## 2022-01-19 RX ADMIN — METHOCARBAMOL 1500 MG: 750 TABLET ORAL at 16:14

## 2022-01-19 RX ADMIN — MORPHINE SULFATE 4 MG: 4 INJECTION INTRAVENOUS at 16:14

## 2022-01-19 RX ADMIN — FENTANYL CITRATE 25 MCG: 50 INJECTION, SOLUTION INTRAMUSCULAR; INTRAVENOUS at 23:14

## 2022-01-19 RX ADMIN — FENTANYL CITRATE 50 MCG: 50 INJECTION, SOLUTION INTRAMUSCULAR; INTRAVENOUS at 17:16

## 2022-01-19 RX ADMIN — KETOROLAC TROMETHAMINE 30 MG: 30 INJECTION, SOLUTION INTRAMUSCULAR at 16:14

## 2022-01-19 RX ADMIN — ONDANSETRON 4 MG: 2 INJECTION INTRAMUSCULAR; INTRAVENOUS at 20:43

## 2022-01-19 RX ADMIN — ONDANSETRON 4 MG: 2 INJECTION INTRAMUSCULAR; INTRAVENOUS at 16:14

## 2022-01-19 RX ADMIN — OXYCODONE HYDROCHLORIDE 5 MG: 5 TABLET ORAL at 20:43

## 2022-01-19 NOTE — ED PROVIDER NOTES
EMERGENCY DEPARTMENT HISTORY AND PHYSICAL EXAM      Date: 1/19/2022  Patient Name: Alia Polo    History of Presenting Illness     Chief Complaint   Patient presents with    Arm Injury       HPI: Alia Polo, 78 y.o. female with a past medical history of hypertension, GERD, and breast cancer presenting with right arm pain. She was in the shower and was reaching up when she felt a pop in her shoulder and had severe pain in the right shoulder, arm, and forearm. She is unable to move her shoulder because of the severe pain. Denies any shortness of breath or chest pain. No trauma. Never had this pain before. Reports shooting pains down her hand. Has normal sensation and no tingling in her fingers. Normal movement of the fingers. No lightheaded, dizziness, or syncope. EMS gave her 150 of fentanyl without response. PCP: Danielle Rose MD    Current Outpatient Medications   Medication Sig Dispense Refill    diclofenac EC (VOLTAREN) 75 mg EC tablet Take 1 Tab by mouth two (2) times a day. 20 Tab 0    ondansetron (ZOFRAN ODT) 4 mg disintegrating tablet Take 1 Tab by mouth every eight (8) hours as needed for Nausea. 20 Tab 0    multivitamin (ONE A DAY) tablet Take 1 tablet by mouth daily.  omeprazole (PRILOSEC) 20 mg capsule TAKE ONE CAPSULE BY MOUTH DAILY 30 Cap 0    KLOR-CON M20 20 mEq tablet TAKE ONE TABLET BY MOUTH EVERY DAY 30 Tab 4    rosuvastatin (CRESTOR) 10 mg tablet Take 1 Tab by mouth daily. 30 Tab 4    lisinopril (PRINIVIL, ZESTRIL) 10 mg tablet Take 1 Tab by mouth daily. 30 Tab 1    polyethylene glycol (MIRALAX) 17 gram/dose powder Take 17 g by mouth two (2) times a day. 510 g 3    acetaminophen (TYLENOL EXTRA STRENGTH) 500 mg tablet Take  by mouth every six (6) hours as needed.  butalbital-acetaminophen-caffeine (FIORICET, ESGIC) -40 mg per tablet Take 1 Tab by mouth every six (6) hours as needed for Pain.  30 Tab 0    aspirin (ASPIRIN) 325 mg tablet Take 325 mg by mouth daily. Medical History   I reviewed the medical, surgical, family, and social history, as well as allergies:    Past Medical History:  Past Medical History:   Diagnosis Date    Cancer [facility])     had breast cancer     Colon polyps 10/1/2010    Diverticulosis 10/1/2010    GERD (gastroesophageal reflux disease)     Hypertension     Unspecified sleep apnea        Past Surgical History:  Past Surgical History:   Procedure Laterality Date    COLONOSCOPY N/A 2019    COLONOS[facility]a [facility]ino [facility] ENDOSCOPY    Logan Godinez  2015         Logan Godinez  2019         ENDOSCOPY, COLON, DIAGNOSTIC      polyps removed    HX BREAST LUMPECTOMY      left breast - chemo,surgery, radiation (partial mastectomy)    HX CHOLECYSTECTOMY      HX HYSTERECTOMY      TAHBSO due to ?cancer - MCV    [place] COLSC FLX W/RMVL OF TUMOR POLYP LESION SNARE TQ  3/8/2012            Family History:  Family History   Problem Relation Age of Onset    Cancer Mother         bone    Hypertension Mother     Diabetes Sister     Cancer Sister         pancreatic?  Diabetes Brother     Hypertension Father     Cancer Other        Social History:  Social History     Tobacco Use    Smoking status: Former Smoker     Packs/day: 0.00     Years: 20.00     Pack years: 0.00     Quit date: 10/1/2000     Years since quittin.3    Smokeless tobacco: Never Used   Substance Use Topics    Alcohol use: No    Drug use: No       Allergies: Allergies   Allergen Reactions    Codeine Nausea and Vomiting    Contrast Agent [Iodine] Hives and Itching    Hydrocodone Nausea and Vomiting    Morphine Other (comments)     hallucinations        Review of Systems     Review of Systems   All other systems reviewed and are negative. Physical Exam and Vital Signs   Vital Signs - Reviewed the patient's vital signs.     Patient Vitals for the past 12 hrs:   Temp Pulse Resp BP SpO2   01/19/22 1808  85 18 (!) 142/74 90 %   01/19/22 1537 98.1 °F (36.7 °C) 81 22 (!) 185/84 95 %       Physical Exam:    GENERAL: awake, alert, cooperative, not in distress  HEENT:  * Pupils equal, EOMI  * Head atraumatic  CV:  * regular rhythm  * warm and perfused extremities bilaterally  PULMONARY: Good air movement, no wheezes or crackles  ABDOMEN: soft, not distended, no guarding, not tenderness to palpation  : No suprapubic tenderness  EXTREMITIES/BACK: warm and perfused, no edema. Tenderness to palpation over the right shoulder, arm, and forearm. Normal strength and sensation of the hand. Normal pulses. SKIN: no rashes or signs of trauma  NEURO:  * Speech clear  * Moves U&LE to command      Medical Decision Making and ED Course   - I am the first and primary provider for this patient and am the primary provider of record. - I reviewed the vital signs, available nursing notes, past medical history, past surgical history, family history and social history. - Initial assessment performed. The patients presenting problems have been discussed, and the staff are in agreement with the care plan formulated and outlined with them. I have encouraged them to ask questions as they arise throughout their visit. - Available medical records, nursing notes, old EKGs, and EMS run sheets (if patient was EMS transported) were reviewed    MDM:   Patient is a 78 y.o. female presenting for right upper extremity pain after a popping sound that was atraumatic. Vitals reveal elevated blood pressure but the patient is in severe pain likely exacerbating her hypertension and physical exam reveals tenderness over the right shoulder, arm, and forearm. Based on the history, physical exam, risk factors, and vitals signs, differential includes: Pathologic fracture, bone mets, ligamentous injury, dislocation no concern for neurovascular compromise.       Results     Labs:  Recent Results (from the past 12 hour(s))   CBC WITH AUTOMATED DIFF    Collection Time: 01/19/22  5:27 PM   Result Value Ref Range    WBC 5.8 3.6 - 11.0 K/uL    RBC 4.07 3.80 - 5.20 M/uL    HGB 12.5 11.5 - 16.0 g/dL    HCT 39.1 35.0 - 47.0 %    MCV 96.1 80.0 - 99.0 FL    MCH 30.7 26.0 - 34.0 PG    MCHC 32.0 30.0 - 36.5 g/dL    RDW 18.1 (H) 11.5 - 14.5 %    PLATELET 438 616 - 766 K/uL    MPV 10.5 8.9 - 12.9 FL    NRBC 0.0 0.0  WBC    ABSOLUTE NRBC 0.00 0.00 - 0.01 K/uL    NEUTROPHILS 84 (H) 32 - 75 %    LYMPHOCYTES 8 (L) 12 - 49 %    MONOCYTES 8 5 - 13 %    EOSINOPHILS 0 0 - 7 %    BASOPHILS 0 0 - 1 %    IMMATURE GRANULOCYTES 0 0 - 0.5 %    ABS. NEUTROPHILS 4.8 1.8 - 8.0 K/UL    ABS. LYMPHOCYTES 0.5 (L) 0.8 - 3.5 K/UL    ABS. MONOCYTES 0.5 0.0 - 1.0 K/UL    ABS. EOSINOPHILS 0.0 0.0 - 0.4 K/UL    ABS. BASOPHILS 0.0 0.0 - 0.1 K/UL    ABS. IMM. GRANS. 0.0 0.00 - 0.04 K/UL    DF AUTOMATED     PROTHROMBIN TIME + INR    Collection Time: 01/19/22  5:27 PM   Result Value Ref Range    Prothrombin time 11.9 11.9 - 14.7 sec    INR 0.9 0.9 - 1.1     METABOLIC PANEL, COMPREHENSIVE    Collection Time: 01/19/22  5:27 PM   Result Value Ref Range    Sodium 139 136 - 145 mmol/L    Potassium 3.6 3.5 - 5.1 mmol/L    Chloride 105 97 - 108 mmol/L    CO2 28 21 - 32 mmol/L    Anion gap 6 5 - 15 mmol/L    Glucose 132 (H) 65 - 100 mg/dL    BUN 19 6 - 20 mg/dL    Creatinine 0.88 0.55 - 1.02 mg/dL    BUN/Creatinine ratio 22 (H) 12 - 20      GFR est AA >60 >60 ml/min/1.73m2    GFR est non-AA >60 >60 ml/min/1.73m2    Calcium 9.3 8.5 - 10.1 mg/dL    Bilirubin, total 0.5 0.2 - 1.0 mg/dL    AST (SGOT) 33 15 - 37 U/L    ALT (SGPT) 30 12 - 78 U/L    Alk.  phosphatase 50 45 - 117 U/L    Protein, total 6.8 6.4 - 8.2 g/dL    Albumin 2.8 (L) 3.5 - 5.0 g/dL    Globulin 4.0 2.0 - 4.0 g/dL    A-G Ratio 0.7 (L) 1.1 - 2.2         Radiologic Studies:  CT Results  (Last 48 hours)    None        CXR Results  (Last 48 hours)    None          Medications ordered:  Medications   morphine injection 4 mg (4 mg IntraVENous Given 1/19/22 1614)   ondansetron (ZOFRAN) injection 4 mg (4 mg IntraVENous Given 1/19/22 1614)   methocarbamoL (ROBAXIN) tablet 1,500 mg (1,500 mg Oral Given 1/19/22 1614)   ketorolac (TORADOL) injection 30 mg (30 mg IntraVENous Given 1/19/22 1614)   fentaNYL citrate (PF) injection 50 mcg (50 mcg IntraVENous Given 1/19/22 1716)        ED Course     ED Course:     ED Course as of 01/19/22 1924 Wed Jan 19, 2022   1721 XR: Probable pathologic, minimally displaced fracture involving the distal third of the right humerus. [SS]   [address] [place] [address]  orthopedics. [SS]      ED Course User Index  [SS] Niki Kirby MD       Reassessment / Disposition / Discussion:    Will admit to floor. Final Disposition     Disposition: Condition stable  Admitted to Floor Medical Floor the case was discussed with the admitting physician     ADMISSION: After completion of ED workup and discussion of results and diagnoses with the patient, patient was admitted to the hospital. All the patient's questions were answered. Case was discussed with the receiving team.        Diagnosis     Clinical Impression:   1. Pathological fracture of right humerus, unspecified pathological cause, initial encounter        Attestations:    Jeffrey Lew MD    Please note that this dictation was completed with [employer], the computer voice recognition software. Quite often unanticipated grammatical, syntax, homophones, and other interpretive errors are inadvertently transcribed by the computer software. Please disregard these errors. Please excuse any errors that have escaped final proofreading. Thank you.

## 2022-01-19 NOTE — ED TRIAGE NOTES
Pt BIB EMS after right shoulder and arm injury.  Pt was trying to get out of bath today by pulling up with arm when she heard a pop in arm and shoulder  Pt received 150mcg total of fentanyl and 4mg of zofran PO by EMS

## 2022-01-19 NOTE — ED NOTES
Spoke to patient's granddaughter, she's aware of the situation and will await more updates, states that if patient gets sent home, they will come pick her up, pt updated.

## 2022-01-20 ENCOUNTER — APPOINTMENT (OUTPATIENT)
Dept: GENERAL RADIOLOGY | Age: 77
End: 2022-01-20
Attending: ORTHOPAEDIC SURGERY
Payer: MEDICARE

## 2022-01-20 ENCOUNTER — ANESTHESIA (OUTPATIENT)
Dept: SURGERY | Age: 77
End: 2022-01-20
Payer: MEDICARE

## 2022-01-20 ENCOUNTER — ANESTHESIA EVENT (OUTPATIENT)
Dept: SURGERY | Age: 77
End: 2022-01-20
Payer: MEDICARE

## 2022-01-20 LAB
ANION GAP SERPL CALC-SCNC: 7 MMOL/L (ref 5–15)
BUN SERPL-MCNC: 21 MG/DL (ref 6–20)
BUN/CREAT SERPL: 23 (ref 12–20)
CA-I BLD-MCNC: 9.5 MG/DL (ref 8.5–10.1)
CHLORIDE SERPL-SCNC: 104 MMOL/L (ref 97–108)
CO2 SERPL-SCNC: 29 MMOL/L (ref 21–32)
COVID-19 RAPID TEST, COVR: NOT DETECTED
CREAT SERPL-MCNC: 0.91 MG/DL (ref 0.55–1.02)
ERYTHROCYTE [DISTWIDTH] IN BLOOD BY AUTOMATED COUNT: 13.4 % (ref 11.5–14.5)
GLUCOSE SERPL-MCNC: 126 MG/DL (ref 65–100)
HCT VFR BLD AUTO: 34.1 % (ref 35–47)
HGB BLD-MCNC: 11.2 G/DL (ref 11.5–16)
MCH RBC QN AUTO: 30.1 PG (ref 26–34)
MCHC RBC AUTO-ENTMCNC: 32.8 G/DL (ref 30–36.5)
MCV RBC AUTO: 91.7 FL (ref 80–99)
NRBC # BLD: 0 K/UL (ref 0–0.01)
NRBC BLD-RTO: 0 PER 100 WBC
PLATELET # BLD AUTO: 172 K/UL (ref 150–400)
PMV BLD AUTO: 10.2 FL (ref 8.9–12.9)
POTASSIUM SERPL-SCNC: 3.5 MMOL/L (ref 3.5–5.1)
RBC # BLD AUTO: 3.72 M/UL (ref 3.8–5.2)
SODIUM SERPL-SCNC: 140 MMOL/L (ref 136–145)
SPECIMEN SOURCE: NORMAL
WBC # BLD AUTO: 3.5 K/UL (ref 3.6–11)

## 2022-01-20 PROCEDURE — 77030011992 HC AIRWY NASOPHGL TELE -A: Performed by: NURSE ANESTHETIST, CERTIFIED REGISTERED

## 2022-01-20 PROCEDURE — 88341 IMHCHEM/IMCYTCHM EA ADD ANTB: CPT

## 2022-01-20 PROCEDURE — 74011250636 HC RX REV CODE- 250/636: Performed by: NURSE ANESTHETIST, CERTIFIED REGISTERED

## 2022-01-20 PROCEDURE — C1713 ANCHOR/SCREW BN/BN,TIS/BN: HCPCS | Performed by: ORTHOPAEDIC SURGERY

## 2022-01-20 PROCEDURE — 87186 SC STD MICRODIL/AGAR DIL: CPT

## 2022-01-20 PROCEDURE — G0378 HOSPITAL OBSERVATION PER HR: HCPCS

## 2022-01-20 PROCEDURE — 77030040361 HC SLV COMPR DVT MDII -B: Performed by: ORTHOPAEDIC SURGERY

## 2022-01-20 PROCEDURE — 77030000031 HC BIT DRL QC SYNT -C: Performed by: ORTHOPAEDIC SURGERY

## 2022-01-20 PROCEDURE — 77030013079 HC BLNKT BAIR HGGR 3M -A: Performed by: NURSE ANESTHETIST, CERTIFIED REGISTERED

## 2022-01-20 PROCEDURE — 36415 COLL VENOUS BLD VENIPUNCTURE: CPT

## 2022-01-20 PROCEDURE — 77030016475 HC BIT DRL QC4 SYNT -C: Performed by: ORTHOPAEDIC SURGERY

## 2022-01-20 PROCEDURE — 74011000250 HC RX REV CODE- 250: Performed by: ORTHOPAEDIC SURGERY

## 2022-01-20 PROCEDURE — 77030005513 HC CATH URETH FOL11 MDII -B: Performed by: ORTHOPAEDIC SURGERY

## 2022-01-20 PROCEDURE — 96372 THER/PROPH/DIAG INJ SC/IM: CPT

## 2022-01-20 PROCEDURE — 85027 COMPLETE CBC AUTOMATED: CPT

## 2022-01-20 PROCEDURE — 74011250637 HC RX REV CODE- 250/637: Performed by: INTERNAL MEDICINE

## 2022-01-20 PROCEDURE — 2709999900 HC NON-CHARGEABLE SUPPLY: Performed by: ORTHOPAEDIC SURGERY

## 2022-01-20 PROCEDURE — 77030008684 HC TU ET CUF COVD -B: Performed by: NURSE ANESTHETIST, CERTIFIED REGISTERED

## 2022-01-20 PROCEDURE — 88365 INSITU HYBRIDIZATION (FISH): CPT

## 2022-01-20 PROCEDURE — 74011000250 HC RX REV CODE- 250: Performed by: NURSE ANESTHETIST, CERTIFIED REGISTERED

## 2022-01-20 PROCEDURE — 88311 DECALCIFY TISSUE: CPT

## 2022-01-20 PROCEDURE — 77030019908 HC STETH ESOPH SIMS -A: Performed by: NURSE ANESTHETIST, CERTIFIED REGISTERED

## 2022-01-20 PROCEDURE — 76060000036 HC ANESTHESIA 2.5 TO 3 HR: Performed by: ORTHOPAEDIC SURGERY

## 2022-01-20 PROCEDURE — 77030014405 HC GD ROD RMR SYNT -C: Performed by: ORTHOPAEDIC SURGERY

## 2022-01-20 PROCEDURE — 77030031139 HC SUT VCRL2 J&J -A: Performed by: ORTHOPAEDIC SURGERY

## 2022-01-20 PROCEDURE — 88364 INSITU HYBRIDIZATION (FISH): CPT

## 2022-01-20 PROCEDURE — 74011250636 HC RX REV CODE- 250/636: Performed by: INTERNAL MEDICINE

## 2022-01-20 PROCEDURE — 77010033678 HC OXYGEN DAILY

## 2022-01-20 PROCEDURE — 96376 TX/PRO/DX INJ SAME DRUG ADON: CPT

## 2022-01-20 PROCEDURE — 80048 BASIC METABOLIC PNL TOTAL CA: CPT

## 2022-01-20 PROCEDURE — 88305 TISSUE EXAM BY PATHOLOGIST: CPT

## 2022-01-20 PROCEDURE — 88307 TISSUE EXAM BY PATHOLOGIST: CPT

## 2022-01-20 PROCEDURE — 87077 CULTURE AEROBIC IDENTIFY: CPT

## 2022-01-20 PROCEDURE — 76210000063 HC OR PH I REC FIRST 0.5 HR: Performed by: ORTHOPAEDIC SURGERY

## 2022-01-20 PROCEDURE — 94760 N-INVAS EAR/PLS OXIMETRY 1: CPT

## 2022-01-20 PROCEDURE — 77030003481 HC NDL BIOP GUN BARD -B: Performed by: ORTHOPAEDIC SURGERY

## 2022-01-20 PROCEDURE — 74011000250 HC RX REV CODE- 250: Performed by: INTERNAL MEDICINE

## 2022-01-20 PROCEDURE — 76010000132 HC OR TIME 2.5 TO 3 HR: Performed by: ORTHOPAEDIC SURGERY

## 2022-01-20 PROCEDURE — 87086 URINE CULTURE/COLONY COUNT: CPT

## 2022-01-20 PROCEDURE — 88342 IMHCHEM/IMCYTCHM 1ST ANTB: CPT

## 2022-01-20 PROCEDURE — 74011250636 HC RX REV CODE- 250/636: Performed by: ORTHOPAEDIC SURGERY

## 2022-01-20 PROCEDURE — 77030026438 HC STYL ET INTUB CARD -A: Performed by: NURSE ANESTHETIST, CERTIFIED REGISTERED

## 2022-01-20 PROCEDURE — 87635 SARS-COV-2 COVID-19 AMP PRB: CPT

## 2022-01-20 PROCEDURE — 76000 FLUOROSCOPY <1 HR PHYS/QHP: CPT

## 2022-01-20 PROCEDURE — 74011250637 HC RX REV CODE- 250/637: Performed by: PHYSICIAN ASSISTANT

## 2022-01-20 PROCEDURE — 65270000029 HC RM PRIVATE

## 2022-01-20 DEVICE — IMPLANTABLE DEVICE: Type: IMPLANTABLE DEVICE | Site: HUMERUS | Status: FUNCTIONAL

## 2022-01-20 DEVICE — SCREW BNE L42MM DIA4.5MM G TI 6% ALUM 7% NIOBIUM FULL THRD: Type: IMPLANTABLE DEVICE | Site: HUMERUS | Status: FUNCTIONAL

## 2022-01-20 DEVICE — SCREW BNE L26MM DIA4MM TIB BLU TI ST CANN LOK FULL THRD T25: Type: IMPLANTABLE DEVICE | Site: HUMERUS | Status: FUNCTIONAL

## 2022-01-20 DEVICE — SCREW BNE L38MM DIA4.5MM COR DIA3.9MM G TI 6% ALUM 7%: Type: IMPLANTABLE DEVICE | Site: HUMERUS | Status: FUNCTIONAL

## 2022-01-20 RX ORDER — ACETAMINOPHEN 500 MG
1000 TABLET ORAL EVERY 6 HOURS
Status: DISCONTINUED | OUTPATIENT
Start: 2022-01-20 | End: 2022-01-22 | Stop reason: HOSPADM

## 2022-01-20 RX ORDER — DEXAMETHASONE SODIUM PHOSPHATE 4 MG/ML
INJECTION, SOLUTION INTRA-ARTICULAR; INTRALESIONAL; INTRAMUSCULAR; INTRAVENOUS; SOFT TISSUE AS NEEDED
Status: DISCONTINUED | OUTPATIENT
Start: 2022-01-20 | End: 2022-01-20 | Stop reason: HOSPADM

## 2022-01-20 RX ORDER — HYDROCODONE BITARTRATE AND ACETAMINOPHEN 5; 325 MG/1; MG/1
1 TABLET ORAL AS NEEDED
Status: DISCONTINUED | OUTPATIENT
Start: 2022-01-20 | End: 2022-01-20 | Stop reason: HOSPADM

## 2022-01-20 RX ORDER — HYDROMORPHONE HYDROCHLORIDE 1 MG/ML
INJECTION, SOLUTION INTRAMUSCULAR; INTRAVENOUS; SUBCUTANEOUS AS NEEDED
Status: DISCONTINUED | OUTPATIENT
Start: 2022-01-20 | End: 2022-01-20 | Stop reason: HOSPADM

## 2022-01-20 RX ORDER — ONDANSETRON 2 MG/ML
INJECTION INTRAMUSCULAR; INTRAVENOUS AS NEEDED
Status: DISCONTINUED | OUTPATIENT
Start: 2022-01-20 | End: 2022-01-20 | Stop reason: HOSPADM

## 2022-01-20 RX ORDER — PROPOFOL 10 MG/ML
INJECTION, EMULSION INTRAVENOUS AS NEEDED
Status: DISCONTINUED | OUTPATIENT
Start: 2022-01-20 | End: 2022-01-20 | Stop reason: HOSPADM

## 2022-01-20 RX ORDER — CEFAZOLIN SODIUM 1 G/3ML
INJECTION, POWDER, FOR SOLUTION INTRAMUSCULAR; INTRAVENOUS AS NEEDED
Status: DISCONTINUED | OUTPATIENT
Start: 2022-01-20 | End: 2022-01-20 | Stop reason: HOSPADM

## 2022-01-20 RX ORDER — CELECOXIB 200 MG/1
200 CAPSULE ORAL 2 TIMES DAILY
Status: DISCONTINUED | OUTPATIENT
Start: 2022-01-20 | End: 2022-01-22 | Stop reason: HOSPADM

## 2022-01-20 RX ORDER — ROCURONIUM BROMIDE 10 MG/ML
INJECTION, SOLUTION INTRAVENOUS AS NEEDED
Status: DISCONTINUED | OUTPATIENT
Start: 2022-01-20 | End: 2022-01-20 | Stop reason: HOSPADM

## 2022-01-20 RX ORDER — LIDOCAINE HYDROCHLORIDE 20 MG/ML
INJECTION, SOLUTION EPIDURAL; INFILTRATION; INTRACAUDAL; PERINEURAL AS NEEDED
Status: DISCONTINUED | OUTPATIENT
Start: 2022-01-20 | End: 2022-01-20 | Stop reason: HOSPADM

## 2022-01-20 RX ORDER — FENTANYL CITRATE 50 UG/ML
INJECTION, SOLUTION INTRAMUSCULAR; INTRAVENOUS AS NEEDED
Status: DISCONTINUED | OUTPATIENT
Start: 2022-01-20 | End: 2022-01-20 | Stop reason: HOSPADM

## 2022-01-20 RX ORDER — EPHEDRINE SULFATE/0.9% NACL/PF 50 MG/5 ML
5 SYRINGE (ML) INTRAVENOUS AS NEEDED
Status: DISCONTINUED | OUTPATIENT
Start: 2022-01-20 | End: 2022-01-20 | Stop reason: HOSPADM

## 2022-01-20 RX ORDER — SODIUM CHLORIDE 0.9 % (FLUSH) 0.9 %
5-40 SYRINGE (ML) INJECTION EVERY 8 HOURS
Status: DISCONTINUED | OUTPATIENT
Start: 2022-01-20 | End: 2022-01-20 | Stop reason: HOSPADM

## 2022-01-20 RX ORDER — SODIUM CHLORIDE 0.9 % (FLUSH) 0.9 %
5-40 SYRINGE (ML) INJECTION AS NEEDED
Status: DISCONTINUED | OUTPATIENT
Start: 2022-01-20 | End: 2022-01-20 | Stop reason: HOSPADM

## 2022-01-20 RX ORDER — SODIUM CHLORIDE, SODIUM LACTATE, POTASSIUM CHLORIDE, CALCIUM CHLORIDE 600; 310; 30; 20 MG/100ML; MG/100ML; MG/100ML; MG/100ML
INJECTION, SOLUTION INTRAVENOUS
Status: DISCONTINUED | OUTPATIENT
Start: 2022-01-20 | End: 2022-01-20 | Stop reason: HOSPADM

## 2022-01-20 RX ORDER — MORPHINE SULFATE 2 MG/ML
1 INJECTION, SOLUTION INTRAMUSCULAR; INTRAVENOUS
Status: DISCONTINUED | OUTPATIENT
Start: 2022-01-20 | End: 2022-01-22 | Stop reason: HOSPADM

## 2022-01-20 RX ORDER — ONDANSETRON 2 MG/ML
4 INJECTION INTRAMUSCULAR; INTRAVENOUS AS NEEDED
Status: DISCONTINUED | OUTPATIENT
Start: 2022-01-20 | End: 2022-01-20 | Stop reason: HOSPADM

## 2022-01-20 RX ORDER — FENTANYL CITRATE 50 UG/ML
50 INJECTION, SOLUTION INTRAMUSCULAR; INTRAVENOUS
Status: DISCONTINUED | OUTPATIENT
Start: 2022-01-20 | End: 2022-01-20 | Stop reason: HOSPADM

## 2022-01-20 RX ORDER — LIDOCAINE HYDROCHLORIDE 10 MG/ML
0.1 INJECTION, SOLUTION EPIDURAL; INFILTRATION; INTRACAUDAL; PERINEURAL AS NEEDED
Status: DISCONTINUED | OUTPATIENT
Start: 2022-01-20 | End: 2022-01-20 | Stop reason: HOSPADM

## 2022-01-20 RX ORDER — MIDAZOLAM HYDROCHLORIDE 1 MG/ML
0.5 INJECTION, SOLUTION INTRAMUSCULAR; INTRAVENOUS
Status: DISCONTINUED | OUTPATIENT
Start: 2022-01-20 | End: 2022-01-20 | Stop reason: HOSPADM

## 2022-01-20 RX ORDER — HYDROMORPHONE HYDROCHLORIDE 1 MG/ML
0.4 INJECTION, SOLUTION INTRAMUSCULAR; INTRAVENOUS; SUBCUTANEOUS
Status: DISCONTINUED | OUTPATIENT
Start: 2022-01-20 | End: 2022-01-20 | Stop reason: HOSPADM

## 2022-01-20 RX ORDER — EPHEDRINE SULFATE/0.9% NACL/PF 50 MG/5 ML
SYRINGE (ML) INTRAVENOUS AS NEEDED
Status: DISCONTINUED | OUTPATIENT
Start: 2022-01-20 | End: 2022-01-20 | Stop reason: HOSPADM

## 2022-01-20 RX ORDER — DIPHENHYDRAMINE HYDROCHLORIDE 50 MG/ML
12.5 INJECTION, SOLUTION INTRAMUSCULAR; INTRAVENOUS AS NEEDED
Status: DISCONTINUED | OUTPATIENT
Start: 2022-01-20 | End: 2022-01-20 | Stop reason: HOSPADM

## 2022-01-20 RX ADMIN — OXYCODONE HYDROCHLORIDE 5 MG: 5 TABLET ORAL at 10:24

## 2022-01-20 RX ADMIN — WATER 2 G: 1 INJECTION INTRAMUSCULAR; INTRAVENOUS; SUBCUTANEOUS at 23:08

## 2022-01-20 RX ADMIN — SODIUM CHLORIDE, POTASSIUM CHLORIDE, SODIUM LACTATE AND CALCIUM CHLORIDE: 600; 310; 30; 20 INJECTION, SOLUTION INTRAVENOUS at 19:44

## 2022-01-20 RX ADMIN — LIDOCAINE HYDROCHLORIDE 100 MG: 20 INJECTION, SOLUTION EPIDURAL; INFILTRATION; INTRACAUDAL; PERINEURAL at 19:55

## 2022-01-20 RX ADMIN — CELECOXIB 200 MG: 200 CAPSULE ORAL at 23:11

## 2022-01-20 RX ADMIN — FENTANYL CITRATE 25 MCG: 50 INJECTION, SOLUTION INTRAMUSCULAR; INTRAVENOUS at 18:35

## 2022-01-20 RX ADMIN — FENTANYL CITRATE 50 MCG: 50 INJECTION, SOLUTION INTRAMUSCULAR; INTRAVENOUS at 19:55

## 2022-01-20 RX ADMIN — ACETAMINOPHEN 1000 MG: 500 TABLET, FILM COATED ORAL at 13:35

## 2022-01-20 RX ADMIN — CEFAZOLIN SODIUM 2 G: 1 INJECTION, POWDER, FOR SOLUTION INTRAMUSCULAR; INTRAVENOUS at 20:17

## 2022-01-20 RX ADMIN — LISINOPRIL 10 MG: 10 TABLET ORAL at 10:24

## 2022-01-20 RX ADMIN — ENOXAPARIN SODIUM 40 MG: 100 INJECTION SUBCUTANEOUS at 10:24

## 2022-01-20 RX ADMIN — Medication 15 MG: at 21:08

## 2022-01-20 RX ADMIN — ACETAMINOPHEN 1000 MG: 500 TABLET, FILM COATED ORAL at 17:58

## 2022-01-20 RX ADMIN — ROCURONIUM BROMIDE 30 MG: 50 INJECTION, SOLUTION INTRAVENOUS at 19:55

## 2022-01-20 RX ADMIN — HYDROMORPHONE HYDROCHLORIDE 0.5 MG: 1 INJECTION, SOLUTION INTRAMUSCULAR; INTRAVENOUS; SUBCUTANEOUS at 22:28

## 2022-01-20 RX ADMIN — ONDANSETRON 4 MG: 2 INJECTION INTRAMUSCULAR; INTRAVENOUS at 03:27

## 2022-01-20 RX ADMIN — SODIUM CHLORIDE, PRESERVATIVE FREE 10 ML: 5 INJECTION INTRAVENOUS at 05:21

## 2022-01-20 RX ADMIN — DEXAMETHASONE SODIUM PHOSPHATE 4 MG: 4 INJECTION, SOLUTION INTRA-ARTICULAR; INTRALESIONAL; INTRAMUSCULAR; INTRAVENOUS; SOFT TISSUE at 19:52

## 2022-01-20 RX ADMIN — ONDANSETRON 4 MG: 2 INJECTION INTRAMUSCULAR; INTRAVENOUS at 11:05

## 2022-01-20 RX ADMIN — ACETAMINOPHEN 1000 MG: 500 TABLET, FILM COATED ORAL at 23:11

## 2022-01-20 RX ADMIN — Medication 15 MG: at 21:17

## 2022-01-20 RX ADMIN — ATORVASTATIN CALCIUM 20 MG: 20 TABLET, FILM COATED ORAL at 10:24

## 2022-01-20 RX ADMIN — SUGAMMADEX 200 MG: 100 INJECTION, SOLUTION INTRAVENOUS at 22:17

## 2022-01-20 RX ADMIN — PROPOFOL 100 MG: 10 INJECTION, EMULSION INTRAVENOUS at 20:05

## 2022-01-20 RX ADMIN — FENTANYL CITRATE 25 MCG: 50 INJECTION, SOLUTION INTRAMUSCULAR; INTRAVENOUS at 03:26

## 2022-01-20 RX ADMIN — PANTOPRAZOLE SODIUM 20 MG: 20 TABLET, DELAYED RELEASE ORAL at 10:24

## 2022-01-20 RX ADMIN — ONDANSETRON 4 MG: 2 INJECTION INTRAMUSCULAR; INTRAVENOUS at 17:00

## 2022-01-20 RX ADMIN — HYDROMORPHONE HYDROCHLORIDE 0.5 MG: 1 INJECTION, SOLUTION INTRAMUSCULAR; INTRAVENOUS; SUBCUTANEOUS at 22:23

## 2022-01-20 RX ADMIN — ROCURONIUM BROMIDE 15 MG: 50 INJECTION, SOLUTION INTRAVENOUS at 21:37

## 2022-01-20 RX ADMIN — POTASSIUM CHLORIDE 20 MEQ: 1500 TABLET, EXTENDED RELEASE ORAL at 10:24

## 2022-01-20 RX ADMIN — SODIUM CHLORIDE, PRESERVATIVE FREE 10 ML: 5 INJECTION INTRAVENOUS at 13:37

## 2022-01-20 RX ADMIN — PROPOFOL 100 MG: 10 INJECTION, EMULSION INTRAVENOUS at 19:55

## 2022-01-20 RX ADMIN — ASPIRIN 325 MG ORAL TABLET 325 MG: 325 PILL ORAL at 10:24

## 2022-01-20 RX ADMIN — FENTANYL CITRATE 50 MCG: 50 INJECTION, SOLUTION INTRAMUSCULAR; INTRAVENOUS at 21:03

## 2022-01-20 RX ADMIN — SODIUM CHLORIDE, PRESERVATIVE FREE 10 ML: 5 INJECTION INTRAVENOUS at 05:20

## 2022-01-20 RX ADMIN — PHENYLEPHRINE HYDROCHLORIDE 100 MCG: 10 INJECTION INTRAVENOUS at 22:00

## 2022-01-20 RX ADMIN — ONDANSETRON 4 MG: 2 INJECTION INTRAMUSCULAR; INTRAVENOUS at 19:52

## 2022-01-20 RX ADMIN — SODIUM CHLORIDE, PRESERVATIVE FREE 10 ML: 5 INJECTION INTRAVENOUS at 23:08

## 2022-01-20 NOTE — PROGRESS NOTES
Called the surgeon Dr. Una Garibay to get an update for the patient's surgery. Surgeon will call for her in 1/2 hr. From 1845. Patient son wanted an update. Patient also wanted a fan in the room for after the surgery because it is hot in the rooms. [employer] to approve room fan when family brings it.

## 2022-01-20 NOTE — ED NOTES
TRANSFER - OUT REPORT:    Verbal report given to Adrianna(name) on Sari Quiros  being transferred to [facility](unit) for routine progression of care       Report consisted of patients Situation, Background, Assessment and   Recommendations(SBAR). Information from the following report(s) SBAR, ED Summary, [facility] and Recent Results was reviewed with the receiving nurse. Lines:   Peripheral IV 01/19/22 Left Antecubital (Active)        Opportunity for questions and clarification was provided.       Patient transported with:   [employer]

## 2022-01-20 NOTE — H&P
GENERAL GENERIC H&P/CONSULT    Subjective:  68-year-old female with history of breast cancer on remission, hypertension, GERD. Patient presents to the emergency department with right upper extremity pain. She was pushing against the bathtub in order to get out where she heard a pop and pain on the right arm. Patient was brought in by EMS, in the ER found to have minimally displaced fracture of the distal third of the right humerus which is suspected to be a pathologic fracture. Patient uncomfortable due to pain. Otherwise she denies shortness of breath, cough or chest pain. Blood pressure slightly high probably due to pain. The Dr. Una Garibay consulted from orthopedics, patient will be admitted for surgical management. Past Medical History:   Diagnosis Date    Cancer [facility])     had breast cancer     Colon polyps 10/1/2010    Diverticulosis 10/1/2010    GERD (gastroesophageal reflux disease)     Hypertension     Unspecified sleep apnea       Past Surgical History:   Procedure Laterality Date    COLONOSCOPY N/A 11/12/2019    COLONOSCOPY performed by Milena Anderson MD at [address] [place] [address]  1/22/2015         Pete Self  11/12/2019         ENDOSCOPY, COLON, DIAGNOSTIC      polyps removed    HX BREAST LUMPECTOMY  2000    left breast - chemo,surgery, radiation (partial mastectomy)    HX CHOLECYSTECTOMY      HX HYSTERECTOMY  2000    TAHBSO due to ?cancer - MCV    [place] COLSC FLX W/RMVL OF TUMOR POLYP LESION SNARE TQ  3/8/2012           Prior to Admission medications    Medication Sig Start Date End Date Taking? Authorizing Provider   diclofenac EC (VOLTAREN) 75 mg EC tablet Take 1 Tab by mouth two (2) times a day. 7/2/19   Judge Joaquín MD   ondansetron (ZOFRAN ODT) 4 mg disintegrating tablet Take 1 Tab by mouth every eight (8) hours as needed for Nausea. 7/2/19   Judge Joaquín MD   multivitamin (ONE A DAY) tablet Take 1 tablet by mouth daily.     Provider, Historical omeprazole (PRILOSEC) 20 mg capsule TAKE ONE CAPSULE BY MOUTH DAILY 14   Ligia Chavez MD   KLOR-CON M20 20 mEq tablet TAKE ONE TABLET BY MOUTH EVERY DAY 13   Ligia Chavez MD   rosuvastatin (CRESTOR) 10 mg tablet Take 1 Tab by mouth daily. 6/3/13   Ligia Chavez MD   lisinopril (PRINIVIL, ZESTRIL) 10 mg tablet Take 1 Tab by mouth daily. 6/3/13   Ligia Chavez MD   polyethylene glycol (MIRALAX) 17 gram/dose powder Take 17 g by mouth two (2) times a day. 13   Ligia Chavez MD   acetaminophen (TYLENOL EXTRA STRENGTH) 500 mg tablet Take  by mouth every six (6) hours as needed. Provider, Historical   butalbital-acetaminophen-caffeine (FIORICET, ESGIC) -40 mg per tablet Take 1 Tab by mouth every six (6) hours as needed for Pain. 12   Ligia Chavez MD   aspirin (ASPIRIN) 325 mg tablet Take 325 mg by mouth daily. Provider, Historical     Allergies   Allergen Reactions    Codeine Nausea and Vomiting    Contrast Agent [Iodine] Hives and Itching    Hydrocodone Nausea and Vomiting    Morphine Other (comments)     hallucinations       Social History     Tobacco Use    Smoking status: Former Smoker     Packs/day: 0.00     Years: 20.00     Pack years: 0.00     Quit date: 10/1/2000     Years since quittin.3    Smokeless tobacco: Never Used   Substance Use Topics    Alcohol use: No      Family History   Problem Relation Age of Onset    Cancer Mother         bone    Hypertension Mother     Diabetes Sister     Cancer Sister         pancreatic?  Diabetes Brother     Hypertension Father     Cancer Other       Review of Systems   Constitutional: Negative for appetite change, chills, diaphoresis and fever. HENT: Negative. Eyes: Negative for pain and visual disturbance. Respiratory: Negative. Cardiovascular: Negative. Gastrointestinal: Negative. Endocrine: Negative. Genitourinary: Negative.     Musculoskeletal:        See HPI Skin: Negative. Neurological: Negative. Psychiatric/Behavioral: Negative. Objective:    No intake/output data recorded. No intake/output data recorded. Patient Vitals for the past 8 hrs:   BP Temp Pulse Resp SpO2 Height Weight   01/19/22 2129 139/75  80 18 96 %     01/19/22 1808 (!) 142/74  85 18 90 %     01/19/22 1537 (!) 185/84 98.1 °F (36.7 °C) 81 22 95 %     01/19/22 1532      5' 1.02\" (1.55 m) 113.4 kg (250 lb)     Physical Exam  Constitutional:       General: She is not in acute distress. Appearance: Normal appearance. HENT:      Head: Normocephalic and atraumatic. Mouth/Throat:      Mouth: Mucous membranes are moist.      Pharynx: Oropharynx is clear. Eyes:      Extraocular Movements: Extraocular movements intact. Conjunctiva/sclera: Conjunctivae normal.      Pupils: Pupils are equal, round, and reactive to light. Cardiovascular:      Rate and Rhythm: Normal rate. Pulses: Normal pulses. Heart sounds: Normal heart sounds. No murmur heard. No friction rub. No gallop. Pulmonary:      Effort: Pulmonary effort is normal.      Breath sounds: Normal breath sounds. Abdominal:      General: Abdomen is flat. Bowel sounds are normal.      Palpations: Abdomen is soft. Musculoskeletal:         General: Tenderness and signs of injury present. Cervical back: Normal range of motion and neck supple. Comments: Right arm pain and tenderness. Skin:     General: Skin is warm and dry. Neurological:      General: No focal deficit present. Mental Status: She is alert and oriented to person, place, and time. Mental status is at baseline. Cranial Nerves: No cranial nerve deficit. Motor: No weakness.           Labs:    Recent Results (from the past 24 hour(s))   CBC WITH AUTOMATED DIFF    Collection Time: 01/19/22  5:27 PM   Result Value Ref Range    WBC 5.8 3.6 - 11.0 K/uL    RBC 4.07 3.80 - 5.20 M/uL    HGB 12.5 11.5 - 16.0 g/dL    HCT 39.1 35.0 - 47.0 %    MCV 96.1 80.0 - 99.0 FL    MCH 30.7 26.0 - 34.0 PG    MCHC 32.0 30.0 - 36.5 g/dL    RDW 18.1 (H) 11.5 - 14.5 %    PLATELET 069 823 - 332 K/uL    MPV 10.5 8.9 - 12.9 FL    NRBC 0.0 0.0  WBC    ABSOLUTE NRBC 0.00 0.00 - 0.01 K/uL    NEUTROPHILS 84 (H) 32 - 75 %    LYMPHOCYTES 8 (L) 12 - 49 %    MONOCYTES 8 5 - 13 %    EOSINOPHILS 0 0 - 7 %    BASOPHILS 0 0 - 1 %    IMMATURE GRANULOCYTES 0 0 - 0.5 %    ABS. NEUTROPHILS 4.8 1.8 - 8.0 K/UL    ABS. LYMPHOCYTES 0.5 (L) 0.8 - 3.5 K/UL    ABS. MONOCYTES 0.5 0.0 - 1.0 K/UL    ABS. EOSINOPHILS 0.0 0.0 - 0.4 K/UL    ABS. BASOPHILS 0.0 0.0 - 0.1 K/UL    ABS. IMM. GRANS. 0.0 0.00 - 0.04 K/UL    DF AUTOMATED     PROTHROMBIN TIME + INR    Collection Time: 01/19/22  5:27 PM   Result Value Ref Range    Prothrombin time 11.9 11.9 - 14.7 sec    INR 0.9 0.9 - 1.1     METABOLIC PANEL, COMPREHENSIVE    Collection Time: 01/19/22  5:27 PM   Result Value Ref Range    Sodium 139 136 - 145 mmol/L    Potassium 3.6 3.5 - 5.1 mmol/L    Chloride 105 97 - 108 mmol/L    CO2 28 21 - 32 mmol/L    Anion gap 6 5 - 15 mmol/L    Glucose 132 (H) 65 - 100 mg/dL    BUN 19 6 - 20 mg/dL    Creatinine 0.88 0.55 - 1.02 mg/dL    BUN/Creatinine ratio 22 (H) 12 - 20      GFR est AA >60 >60 ml/min/1.73m2    GFR est non-AA >60 >60 ml/min/1.73m2    Calcium 9.3 8.5 - 10.1 mg/dL    Bilirubin, total 0.5 0.2 - 1.0 mg/dL    AST (SGOT) 33 15 - 37 U/L    ALT (SGPT) 30 12 - 78 U/L    Alk.  phosphatase 50 45 - 117 U/L    Protein, total 6.8 6.4 - 8.2 g/dL    Albumin 2.8 (L) 3.5 - 5.0 g/dL    Globulin 4.0 2.0 - 4.0 g/dL    A-G Ratio 0.7 (L) 1.1 - 2.2     TYPE & SCREEN    Collection Time: 01/19/22  5:28 PM   Result Value Ref Range    Crossmatch Expiration 01/22/2022,2359     ABO/Rh(D) A Positive     Antibody screen Negative        Assessment:  Active Problems:    Right humeral fracture (1/19/2022)    Right humeral fracture  -Probably pathology minimally displaced fracture of the distal third of the right humerus  -Shoulder sling  -IV and oral analgesia  -Orthopedics consulted, they plan for repair.   Dr. Ashley Reich  -N.p.o. after midnight    Hypertension  -Lisinopril    History of breast cancer  -History of lumpectomy/chemoradiation  -On remission    DVT prophylaxis: Lovenox    Full code      Signed:  Gillian Ivey MD 1/19/2022

## 2022-01-20 NOTE — PROGRESS NOTES
Reason for Admission:  Right humeral fracture                     RUR Score:   8%                  Plan for utilizing home health:  Possibility depending on after surgery         PCP: First and Last name:  Mahsa Boyle MD     Name of Practice:    Are you a current patient: Yes/No: yes   Approximate date of last visit: nov 2021   Can you participate in a virtual visit with your PCP: not sure                    Current Advanced Directive/Advance Care Plan: Full Code      Healthcare Decision Maker:     Vera Yung [phone]  Click here to complete [address] including selection of the Healthcare Decision Maker Relationship (ie \"Primary\")                             Transition of Care Plan:        met with patient at bedside. States she lives in 1 story home with her daughter and granddaughter. She uses a CPAP at home which she states \"got from her insurance. \" claims she doesn't use any walker or cane but states she has balance issues. She does not drive, daughter takes her to appointments. No history of HH, SNF, IRF. Current dc plan is home with family.     DC home with possible HH

## 2022-01-20 NOTE — PROGRESS NOTES
Hospitalist Progress Note    Subjective:   Daily Progress Note: 1/20/2022 12:12 PM    70yo female with a significant h/o breast cancer in remission, HTN, GERD, and diverticulosis presented to the ED yesterday (1/19) with c/o acute onset right arm pain/ popping sound after pulling herself out of the bathtub. XR at ED yielded probable pathologic, minimally displaced fracture involving distal third of right humerus. Pt resting comfortably in bed today, NPO for surgery today or tomorrow, endorsing constant but manageable R arm pain. Had some nausea this morning after taking oxycodone, but had little in stomach to vomit. Denies chest pain, stomach pain, or dyspnea. Current Facility-Administered Medications   Medication Dose Route Frequency    aspirin tablet 325 mg  325 mg Oral DAILY    atorvastatin (LIPITOR) tablet 20 mg  20 mg Oral DAILY    lisinopriL (PRINIVIL, ZESTRIL) tablet 10 mg  10 mg Oral DAILY    potassium chloride (K-DUR, KLOR-CON M20) SR tablet 20 mEq  20 mEq Oral DAILY    pantoprazole (PROTONIX) tablet 20 mg  20 mg Oral DAILY    sodium chloride (NS) flush 5-40 mL  5-40 mL IntraVENous Q8H    sodium chloride (NS) flush 5-40 mL  5-40 mL IntraVENous PRN    acetaminophen (TYLENOL) tablet 650 mg  650 mg Oral Q6H PRN    Or    acetaminophen (TYLENOL) suppository 650 mg  650 mg Rectal Q6H PRN    polyethylene glycol (MIRALAX) packet 17 g  17 g Oral DAILY PRN    ondansetron (ZOFRAN ODT) tablet 4 mg  4 mg Oral Q8H PRN    Or    ondansetron (ZOFRAN) injection 4 mg  4 mg IntraVENous Q6H PRN    enoxaparin (LOVENOX) injection 40 mg  40 mg SubCUTAneous DAILY    oxyCODONE IR (ROXICODONE) tablet 5 mg  5 mg Oral Q4H PRN    fentaNYL citrate (PF) injection 25 mcg  25 mcg IntraVENous Q4H PRN        Review of Systems  Review of Systems   Constitutional: Negative for diaphoresis, fever and malaise/fatigue. Eyes: Negative for blurred vision and double vision.    Respiratory: Negative for cough, hemoptysis and shortness of breath. Cardiovascular: Negative for chest pain and palpitations. Gastrointestinal: Positive for vomiting. Negative for abdominal pain, diarrhea and nausea. Very little vomiting after oxycodone   Musculoskeletal: Positive for myalgias. Neurological: Negative for dizziness, tingling and focal weakness. Objective:     Visit Vitals  BP (!) 146/70 (BP 1 Location: Left leg)   Pulse 72   Temp 97.8 °F (36.6 °C)   Resp 18   Ht 5' 1.02\" (1.55 m)   Wt 113.4 kg (250 lb)   SpO2 98%   BMI 47.20 kg/m²    O2 Flow Rate (L/min): 3 l/min O2 Device: Nasal cannula    Temp (24hrs), Av.1 °F (36.7 °C), Min:97.8 °F (36.6 °C), Max:98.3 °F (36.8 °C)      No intake/output data recorded. No intake/output data recorded. Recent Results (from the past 24 hour(s))   CBC WITH AUTOMATED DIFF    Collection Time: 22  5:27 PM   Result Value Ref Range    WBC 5.8 3.6 - 11.0 K/uL    RBC 4.07 3.80 - 5.20 M/uL    HGB 12.5 11.5 - 16.0 g/dL    HCT 39.1 35.0 - 47.0 %    MCV 96.1 80.0 - 99.0 FL    MCH 30.7 26.0 - 34.0 PG    MCHC 32.0 30.0 - 36.5 g/dL    RDW 18.1 (H) 11.5 - 14.5 %    PLATELET 393 020 - 383 K/uL    MPV 10.5 8.9 - 12.9 FL    NRBC 0.0 0.0  WBC    ABSOLUTE NRBC 0.00 0.00 - 0.01 K/uL    NEUTROPHILS 84 (H) 32 - 75 %    LYMPHOCYTES 8 (L) 12 - 49 %    MONOCYTES 8 5 - 13 %    EOSINOPHILS 0 0 - 7 %    BASOPHILS 0 0 - 1 %    IMMATURE GRANULOCYTES 0 0 - 0.5 %    ABS. NEUTROPHILS 4.8 1.8 - 8.0 K/UL    ABS. LYMPHOCYTES 0.5 (L) 0.8 - 3.5 K/UL    ABS. MONOCYTES 0.5 0.0 - 1.0 K/UL    ABS. EOSINOPHILS 0.0 0.0 - 0.4 K/UL    ABS. BASOPHILS 0.0 0.0 - 0.1 K/UL    ABS. IMM.  GRANS. 0.0 0.00 - 0.04 K/UL    DF AUTOMATED     PROTHROMBIN TIME + INR    Collection Time: 22  5:27 PM   Result Value Ref Range    Prothrombin time 11.9 11.9 - 14.7 sec    INR 0.9 0.9 - 1.1     METABOLIC PANEL, COMPREHENSIVE    Collection Time: 22  5:27 PM   Result Value Ref Range    Sodium 139 136 - 145 mmol/L    Potassium 3.6 3.5 - 5.1 mmol/L    Chloride 105 97 - 108 mmol/L    CO2 28 21 - 32 mmol/L    Anion gap 6 5 - 15 mmol/L    Glucose 132 (H) 65 - 100 mg/dL    BUN 19 6 - 20 mg/dL    Creatinine 0.88 0.55 - 1.02 mg/dL    BUN/Creatinine ratio 22 (H) 12 - 20      GFR est AA >60 >60 ml/min/1.73m2    GFR est non-AA >60 >60 ml/min/1.73m2    Calcium 9.3 8.5 - 10.1 mg/dL    Bilirubin, total 0.5 0.2 - 1.0 mg/dL    AST (SGOT) 33 15 - 37 U/L    ALT (SGPT) 30 12 - 78 U/L    Alk. phosphatase 50 45 - 117 U/L    Protein, total 6.8 6.4 - 8.2 g/dL    Albumin 2.8 (L) 3.5 - 5.0 g/dL    Globulin 4.0 2.0 - 4.0 g/dL    A-G Ratio 0.7 (L) 1.1 - 2.2     TYPE & SCREEN    Collection Time: 01/19/22  5:28 PM   Result Value Ref Range    Crossmatch Expiration 01/22/2022,2359     ABO/Rh(D) A Positive     Antibody screen Negative    METABOLIC PANEL, BASIC    Collection Time: 01/20/22  5:08 AM   Result Value Ref Range    Sodium 140 136 - 145 mmol/L    Potassium 3.5 3.5 - 5.1 mmol/L    Chloride 104 97 - 108 mmol/L    CO2 29 21 - 32 mmol/L    Anion gap 7 5 - 15 mmol/L    Glucose 126 (H) 65 - 100 mg/dL    BUN 21 (H) 6 - 20 mg/dL    Creatinine 0.91 0.55 - 1.02 mg/dL    BUN/Creatinine ratio 23 (H) 12 - 20      GFR est AA >60 >60 ml/min/1.73m2    GFR est non-AA >60 >60 ml/min/1.73m2    Calcium 9.5 8.5 - 10.1 mg/dL   CBC W/O DIFF    Collection Time: 01/20/22  5:08 AM   Result Value Ref Range    WBC 3.5 (L) 3.6 - 11.0 K/uL    RBC 3.72 (L) 3.80 - 5.20 M/uL    HGB 11.2 (L) 11.5 - 16.0 g/dL    HCT 34.1 (L) 35.0 - 47.0 %    MCV 91.7 80.0 - 99.0 FL    MCH 30.1 26.0 - 34.0 PG    MCHC 32.8 30.0 - 36.5 g/dL    RDW 13.4 11.5 - 14.5 %    PLATELET 821 234 - 089 K/uL    MPV 10.2 8.9 - 12.9 FL    NRBC 0.0 0.0  WBC    ABSOLUTE NRBC 0.00 0.00 - 0.01 K/uL        XR CHEST PORT   Final Result   No evidence of an acute cardiopulmonary process.       XR SHOULDER RT AP/LAT MIN 2 V   Final Result   Probable pathologic, minimally displaced fracture involving the   distal third of the right humerus. XR HUMERUS RT   Final Result   Probable pathologic, minimally displaced fracture involving the   distal third of the right humerus. XR FOREARM RT AP/LAT   Final Result   There is a subtle indentation in the soft tissues of the proximal   forearm adjacent to the proximal third of the radius on the lateral projection. Cannot exclude a laceration. There is no radiopaque foreign body. No displaced   fracture involving the radius or ulna. Degenerative changes of the distal   radius. PHYSICAL EXAM:    Physical Exam  Constitutional:       Appearance: She is obese. HENT:      Head: Normocephalic and atraumatic. Eyes:      Extraocular Movements: Extraocular movements intact. Cardiovascular:      Rate and Rhythm: Normal rate and regular rhythm. Pulses: Normal pulses. Heart sounds: Normal heart sounds. Pulmonary:      Effort: Pulmonary effort is normal.      Breath sounds: Normal breath sounds. Abdominal:      General: Bowel sounds are normal.      Palpations: Abdomen is soft. Musculoskeletal:         General: Signs of injury present. No tenderness. Cervical back: Normal range of motion. Comments: Right arm in sling unable to verify fracture site. Hand with normal sensation and motor ability   Skin:     General: Skin is warm and dry. Neurological:      General: No focal deficit present. Mental Status: She is alert and oriented to person, place, and time. Psychiatric:         Mood and Affect: Mood normal.         Behavior: Behavior normal.         Thought Content:  Thought content normal.         Judgment: Judgment normal.          Data Review    Recent Results (from the past 24 hour(s))   CBC WITH AUTOMATED DIFF    Collection Time: 01/19/22  5:27 PM   Result Value Ref Range    WBC 5.8 3.6 - 11.0 K/uL    RBC 4.07 3.80 - 5.20 M/uL    HGB 12.5 11.5 - 16.0 g/dL    HCT 39.1 35.0 - 47.0 %    MCV 96.1 80.0 - 99.0 FL    MCH 30.7 26.0 - 34.0 PG    MCHC 32.0 30.0 - 36.5 g/dL    RDW 18.1 (H) 11.5 - 14.5 %    PLATELET 528 047 - 929 K/uL    MPV 10.5 8.9 - 12.9 FL    NRBC 0.0 0.0  WBC    ABSOLUTE NRBC 0.00 0.00 - 0.01 K/uL    NEUTROPHILS 84 (H) 32 - 75 %    LYMPHOCYTES 8 (L) 12 - 49 %    MONOCYTES 8 5 - 13 %    EOSINOPHILS 0 0 - 7 %    BASOPHILS 0 0 - 1 %    IMMATURE GRANULOCYTES 0 0 - 0.5 %    ABS. NEUTROPHILS 4.8 1.8 - 8.0 K/UL    ABS. LYMPHOCYTES 0.5 (L) 0.8 - 3.5 K/UL    ABS. MONOCYTES 0.5 0.0 - 1.0 K/UL    ABS. EOSINOPHILS 0.0 0.0 - 0.4 K/UL    ABS. BASOPHILS 0.0 0.0 - 0.1 K/UL    ABS. IMM. GRANS. 0.0 0.00 - 0.04 K/UL    DF AUTOMATED     PROTHROMBIN TIME + INR    Collection Time: 01/19/22  5:27 PM   Result Value Ref Range    Prothrombin time 11.9 11.9 - 14.7 sec    INR 0.9 0.9 - 1.1     METABOLIC PANEL, COMPREHENSIVE    Collection Time: 01/19/22  5:27 PM   Result Value Ref Range    Sodium 139 136 - 145 mmol/L    Potassium 3.6 3.5 - 5.1 mmol/L    Chloride 105 97 - 108 mmol/L    CO2 28 21 - 32 mmol/L    Anion gap 6 5 - 15 mmol/L    Glucose 132 (H) 65 - 100 mg/dL    BUN 19 6 - 20 mg/dL    Creatinine 0.88 0.55 - 1.02 mg/dL    BUN/Creatinine ratio 22 (H) 12 - 20      GFR est AA >60 >60 ml/min/1.73m2    GFR est non-AA >60 >60 ml/min/1.73m2    Calcium 9.3 8.5 - 10.1 mg/dL    Bilirubin, total 0.5 0.2 - 1.0 mg/dL    AST (SGOT) 33 15 - 37 U/L    ALT (SGPT) 30 12 - 78 U/L    Alk.  phosphatase 50 45 - 117 U/L    Protein, total 6.8 6.4 - 8.2 g/dL    Albumin 2.8 (L) 3.5 - 5.0 g/dL    Globulin 4.0 2.0 - 4.0 g/dL    A-G Ratio 0.7 (L) 1.1 - 2.2     TYPE & SCREEN    Collection Time: 01/19/22  5:28 PM   Result Value Ref Range    Crossmatch Expiration 01/22/2022,2359     ABO/Rh(D) A Positive     Antibody screen Negative    METABOLIC PANEL, BASIC    Collection Time: 01/20/22  5:08 AM   Result Value Ref Range    Sodium 140 136 - 145 mmol/L    Potassium 3.5 3.5 - 5.1 mmol/L    Chloride 104 97 - 108 mmol/L    CO2 29 21 - 32 mmol/L    Anion gap 7 5 - 15 mmol/L    Glucose 126 (H) 65 - 100 mg/dL BUN 21 (H) 6 - 20 mg/dL    Creatinine 0.91 0.55 - 1.02 mg/dL    BUN/Creatinine ratio 23 (H) 12 - 20      GFR est AA >60 >60 ml/min/1.73m2    GFR est non-AA >60 >60 ml/min/1.73m2    Calcium 9.5 8.5 - 10.1 mg/dL   CBC W/O DIFF    Collection Time: 01/20/22  5:08 AM   Result Value Ref Range    WBC 3.5 (L) 3.6 - 11.0 K/uL    RBC 3.72 (L) 3.80 - 5.20 M/uL    HGB 11.2 (L) 11.5 - 16.0 g/dL    HCT 34.1 (L) 35.0 - 47.0 %    MCV 91.7 80.0 - 99.0 FL    MCH 30.1 26.0 - 34.0 PG    MCHC 32.8 30.0 - 36.5 g/dL    RDW 13.4 11.5 - 14.5 %    PLATELET 083 758 - 153 K/uL    MPV 10.2 8.9 - 12.9 FL    NRBC 0.0 0.0  WBC    ABSOLUTE NRBC 0.00 0.00 - 0.01 K/uL        Assessment/Plan:     Active Problems:    Right humeral fracture (1/19/2022)    Hospital Course:  77 yo female who presented to the ED after acute onset of R arm pain after pulling herself out of the bathtub and hearing a pop with pain. XR yielded probable pathologic, minimally displaced fracture involving distal third of right humerus. NPO since last night and according to Ortho, surgery will be tomorrow (1/21) if not today. Assessment/ Plan    1. Right humeral fracture  Ortho consulted  Scheduled for ORIF today or tomorrow. Remains NPO    2. HTN/ HLD  Continue lisinopril, lipitor    3. GERD  Continue Protonix    4. Remote history of breast cancer  Cancer free for greater than 10 years      DVT Prophylaxis  Continue Lovenox    CODE STATUS: Full code    *Disposition: Clearance from orthopedic after surgery    Surgical risk calculator from the [employer] of surgeons has the patient with lower than average risk consistent with low risk. Serious complications at 5.3%. Discussed this with the patient and her son, Satnos Gregory. Patient had a extensive cardiac work-up at [facility] and with gentle care and according to family was without significant cardiac impairment. Care Plan discussed with: Patient/Family    Total time spent with patient: >35 minutes.

## 2022-01-20 NOTE — CONSULTS
ORTHOPEDIC CONSULT    Patient: Francisco Banks MRN: [id number]  SSN: [id number]    YOB: 1945  Age: 78 y.o. Sex: female      Subjective:      Francisco Banks is a 78 y.o. female who is being seen in orthopedic consultation for right humerus fracture. The patient states she was pulling herself up out of the bathtub when she felt a pop of her right upper extremity. The patient states she was started feeling tremendous pain at that point. She denies any history of recent falls or trauma to her right upper extremity. The patient is right-hand dominant. Evaluation the emergency room did reveal a fracture of her distal humerus possible pathologic. Patient does have a history of breast cancer approximately 16 years ago which was treated with radiation and chemotherapy. The patient has had no recurrence since that time. She does state that her mother did pass away with bone cancer and her sister was diagnosed with bone cancer as well. The patient now complaining of mild to moderate pain of her right upper extremity. The pain is exacerbated with any movement of her right upper extremity. She denies any numbness or tingling of her right upper extremity. She denies any injuries to her head or cervical spine. She denies any other musculoskeletal complaints at this time.     Past Medical History:   Diagnosis Date    Cancer [facility])     had breast cancer     Colon polyps 10/1/2010    Diverticulosis 10/1/2010    GERD (gastroesophageal reflux disease)     Hypertension     Unspecified sleep apnea      Past Surgical History:   Procedure Laterality Date    COLONOSCOPY N/A 11/12/2019    COLONOSCOPY performed by Jamal Plaza MD at [address] [place] [address]  1/22/2015         COLONOSCOPY,MELANIA Arce  11/12/2019         ENDOSCOPY, COLON, DIAGNOSTIC      polyps removed    HX BREAST LUMPECTOMY  2000    left breast - chemo,surgery, radiation (partial mastectomy)    HX CHOLECYSTECTOMY      HX HYSTERECTOMY      TAHBSO due to ?cancer - MCV    AL COLSC FLX W/RMVL OF TUMOR POLYP LESION SNARE TQ  3/8/2012           Family History   Problem Relation Age of Onset    Cancer Mother         bone    Hypertension Mother     Diabetes Sister     Cancer Sister         pancreatic?  Diabetes Brother     Hypertension Father     Cancer Other      Social History     Tobacco Use    Smoking status: Former Smoker     Packs/day: 0.00     Years: 20.00     Pack years: 0.00     Quit date: 10/1/2000     Years since quittin.3    Smokeless tobacco: Never Used   Substance Use Topics    Alcohol use: No      Prior to Admission medications    Medication Sig Start Date End Date Taking? Authorizing Provider   diclofenac EC (VOLTAREN) 75 mg EC tablet Take 1 Tab by mouth two (2) times a day. 19   Debbie Little MD   ondansetron (ZOFRAN ODT) 4 mg disintegrating tablet Take 1 Tab by mouth every eight (8) hours as needed for Nausea. 19   Debbie Little MD   multivitamin (ONE A DAY) tablet Take 1 tablet by mouth daily. Provider, Historical   omeprazole (PRILOSEC) 20 mg capsule TAKE ONE CAPSULE BY MOUTH DAILY 14   Johanne Levin MD   KLOR-CON M20 20 mEq tablet TAKE ONE TABLET BY MOUTH EVERY DAY 13   Johanne Levin MD   rosuvastatin (CRESTOR) 10 mg tablet Take 1 Tab by mouth daily. 6/3/13   Johanne Levin MD   lisinopril (PRINIVIL, ZESTRIL) 10 mg tablet Take 1 Tab by mouth daily. 6/3/13   Johanne Levin MD   polyethylene glycol (MIRALAX) 17 gram/dose powder Take 17 g by mouth two (2) times a day. 13   Johanne Levin MD   acetaminophen (TYLENOL EXTRA STRENGTH) 500 mg tablet Take  by mouth every six (6) hours as needed. Provider, Historical   butalbital-acetaminophen-caffeine (FIORICET, ESGIC) -40 mg per tablet Take 1 Tab by mouth every six (6) hours as needed for Pain.  12   Johanne Levin MD   aspirin (ASPIRIN) 325 mg tablet Take 325 mg by mouth daily. Provider, Historical       Allergies   Allergen Reactions    Codeine Nausea and Vomiting    Contrast Agent [Iodine] Hives and Itching    Hydrocodone Nausea and Vomiting    Morphine Other (comments)     hallucinations        Review of Systems:  Review of Systems   Constitutional: Negative. HENT: Negative. Eyes: Negative. Respiratory: Negative. Cardiovascular: Negative. Gastrointestinal: Negative. Genitourinary: Negative. Musculoskeletal: Positive for joint pain. Right upper extremity   Skin: Negative. Neurological: Negative. Endo/Heme/Allergies: Negative. Psychiatric/Behavioral: Positive for depression.          Objective:     Current Facility-Administered Medications   Medication Dose Route Frequency    aspirin tablet 325 mg  325 mg Oral DAILY    atorvastatin (LIPITOR) tablet 20 mg  20 mg Oral DAILY    lisinopriL (PRINIVIL, ZESTRIL) tablet 10 mg  10 mg Oral DAILY    potassium chloride (K-DUR, KLOR-CON M20) SR tablet 20 mEq  20 mEq Oral DAILY    pantoprazole (PROTONIX) tablet 20 mg  20 mg Oral DAILY    sodium chloride (NS) flush 5-40 mL  5-40 mL IntraVENous Q8H    sodium chloride (NS) flush 5-40 mL  5-40 mL IntraVENous PRN    acetaminophen (TYLENOL) tablet 650 mg  650 mg Oral Q6H PRN    Or    acetaminophen (TYLENOL) suppository 650 mg  650 mg Rectal Q6H PRN    polyethylene glycol (MIRALAX) packet 17 g  17 g Oral DAILY PRN    ondansetron (ZOFRAN ODT) tablet 4 mg  4 mg Oral Q8H PRN    Or    ondansetron (ZOFRAN) injection 4 mg  4 mg IntraVENous Q6H PRN    enoxaparin (LOVENOX) injection 40 mg  40 mg SubCUTAneous DAILY    oxyCODONE IR (ROXICODONE) tablet 5 mg  5 mg Oral Q4H PRN    fentaNYL citrate (PF) injection 25 mcg  25 mcg IntraVENous Q4H PRN      Vitals:    01/20/22 0108 01/20/22 0317 01/20/22 0731 01/20/22 0810   BP: (!) 144/70 (!) 151/73 (!) 146/70    Pulse: 72 84 72    Resp: 18 18 18    Temp:  98.3 °F (36.8 °C) 97.8 °F (36.6 °C)    SpO2: 97% 97%  98%   Weight:       Height:            Alert and oriented x3, No apparent distress    Physical Exam:  Right upper extremity: Shoulder sling is in place. Skin warm dry and intact throughout right upper extremity. No swelling erythema or ecchymosis seen. There is mild tenderness palpation through the distal portion of her right humerus. Radial pulses palpable. Sensation to sharp dull touch was intact throughout right upper extremity.  strength was 4 out of 5. EPL was intact. Cap refill is 2 seconds. No tenderness palpation throughout her right shoulder. Right upper extremity appears neurovascularly intact. Labs:  CBC:  Recent Labs     01/20/22  0508 01/19/22  1727   WBC 3.5* 5.8   RBC 3.72* 4.07   HGB 11.2* 12.5   HCT 34.1* 39.1   MCV 91.7 96.1   RDW 13.4 18.1*    200     CHEMISTRIES:  Recent Labs     01/20/22  0508 01/19/22  1727    139   K 3.5 3.6    105   CO2 29 28   BUN 21* 19   CREA 0.91 0.88   CA 9.5 9.3   PT/INR:  Recent Labs     01/19/22  1727   INR 0.9     APTT:No results for input(s): APTT in the last 72 hours. LIVER PROFILE:  Recent Labs     01/19/22  1727   AST 33   ALT 30       IMAGING:  X-rays taken of her right humerus show a transverse mildly displaced fracture of the distal third of her humerus pathologic appearance in nature. No other fracture seen. Glenohumeral joint remains intact. X-rays taken of her right forearm show no acute fractures. Assessment/Plan:     Hospital Problems  Date Reviewed: 11/11/2019          Codes Class Noted POA    Right humeral fracture ICD-10-CM: S42.301A  ICD-9-CM: 812.20  1/19/2022 Unknown            Distal humerus fracture right upper extremity possible pathologic in nature. This patient would benefit from surgical fixation consisting of intramedullary nailing/ORIF. Nonoperative, conservative management was addressed as well.   The procedure, risk and benefits were explained to the patient detail including but not limited to: Risk of infection, neurovascular injury, decreased use of extremity, pain, non-/malunion, hardware retention, wound dehiscence, loss of limb, risk of DVT, reaction to anesthesia, risk of pneumonia, possible need of blood transfusion related risk, reaction to anesthesia and possible death. I also explained to the patient that during the procedure we will perform a biopsy via the bone reamings to identify if there is a tumor process present. The patient expressed full exam and agrees to proceed with surgical intervention. This patient will need oncology evaluation as operatively. This patient was examined in direct consult with Dr. Radames Ceballos. Thank you for the courtesy of this consult.     Signed By: Dilan Hadley PA-C     January 20, 2022

## 2022-01-21 ENCOUNTER — APPOINTMENT (OUTPATIENT)
Dept: CT IMAGING | Age: 77
End: 2022-01-21
Attending: ORTHOPAEDIC SURGERY
Payer: MEDICARE

## 2022-01-21 ENCOUNTER — APPOINTMENT (OUTPATIENT)
Dept: NUCLEAR MEDICINE | Age: 77
End: 2022-01-21
Attending: ORTHOPAEDIC SURGERY
Payer: MEDICARE

## 2022-01-21 LAB
ALBUMIN SERPL-MCNC: 2.4 G/DL (ref 3.5–5)
ALBUMIN/GLOB SERPL: 0.6 {RATIO} (ref 1.1–2.2)
ALP SERPL-CCNC: 56 U/L (ref 45–117)
ALT SERPL-CCNC: 38 U/L (ref 12–78)
ANION GAP SERPL CALC-SCNC: 8 MMOL/L (ref 5–15)
AST SERPL W P-5'-P-CCNC: 30 U/L (ref 15–37)
BASOPHILS # BLD: 0 K/UL (ref 0–0.1)
BASOPHILS NFR BLD: 0 % (ref 0–1)
BILIRUB SERPL-MCNC: 0.4 MG/DL (ref 0.2–1)
BUN SERPL-MCNC: 25 MG/DL (ref 6–20)
BUN/CREAT SERPL: 26 (ref 12–20)
CA-I BLD-MCNC: 9.1 MG/DL (ref 8.5–10.1)
CHLORIDE SERPL-SCNC: 103 MMOL/L (ref 97–108)
CO2 SERPL-SCNC: 28 MMOL/L (ref 21–32)
CREAT SERPL-MCNC: 0.96 MG/DL (ref 0.55–1.02)
DIFFERENTIAL METHOD BLD: ABNORMAL
EOSINOPHIL # BLD: 0 K/UL (ref 0–0.4)
EOSINOPHIL NFR BLD: 0 % (ref 0–7)
ERYTHROCYTE [DISTWIDTH] IN BLOOD BY AUTOMATED COUNT: 13.7 % (ref 11.5–14.5)
GLOBULIN SER CALC-MCNC: 3.8 G/DL (ref 2–4)
GLUCOSE SERPL-MCNC: 153 MG/DL (ref 65–100)
HCT VFR BLD AUTO: 31.7 % (ref 35–47)
HGB BLD-MCNC: 10.2 G/DL (ref 11.5–16)
IMM GRANULOCYTES # BLD AUTO: 0 K/UL (ref 0–0.04)
IMM GRANULOCYTES NFR BLD AUTO: 0 % (ref 0–0.5)
LYMPHOCYTES # BLD: 0.3 K/UL (ref 0.8–3.5)
LYMPHOCYTES NFR BLD: 6 % (ref 12–49)
MCH RBC QN AUTO: 30 PG (ref 26–34)
MCHC RBC AUTO-ENTMCNC: 32.2 G/DL (ref 30–36.5)
MCV RBC AUTO: 93.2 FL (ref 80–99)
MONOCYTES # BLD: 0.3 K/UL (ref 0–1)
MONOCYTES NFR BLD: 5 % (ref 5–13)
NEUTS SEG # BLD: 5.1 K/UL (ref 1.8–8)
NEUTS SEG NFR BLD: 89 % (ref 32–75)
NRBC # BLD: 0 K/UL (ref 0–0.01)
NRBC BLD-RTO: 0 PER 100 WBC
PLATELET # BLD AUTO: 169 K/UL (ref 150–400)
PMV BLD AUTO: 10.5 FL (ref 8.9–12.9)
POTASSIUM SERPL-SCNC: 4 MMOL/L (ref 3.5–5.1)
PROT SERPL-MCNC: 6.2 G/DL (ref 6.4–8.2)
RBC # BLD AUTO: 3.4 M/UL (ref 3.8–5.2)
SODIUM SERPL-SCNC: 139 MMOL/L (ref 136–145)
WBC # BLD AUTO: 5.7 K/UL (ref 3.6–11)

## 2022-01-21 PROCEDURE — 74011250637 HC RX REV CODE- 250/637: Performed by: INTERNAL MEDICINE

## 2022-01-21 PROCEDURE — 74011250636 HC RX REV CODE- 250/636: Performed by: INTERNAL MEDICINE

## 2022-01-21 PROCEDURE — 97530 THERAPEUTIC ACTIVITIES: CPT

## 2022-01-21 PROCEDURE — 74011000250 HC RX REV CODE- 250: Performed by: INTERNAL MEDICINE

## 2022-01-21 PROCEDURE — 85025 COMPLETE CBC W/AUTO DIFF WBC: CPT

## 2022-01-21 PROCEDURE — 74011000250 HC RX REV CODE- 250: Performed by: ORTHOPAEDIC SURGERY

## 2022-01-21 PROCEDURE — 97161 PT EVAL LOW COMPLEX 20 MIN: CPT

## 2022-01-21 PROCEDURE — 96372 THER/PROPH/DIAG INJ SC/IM: CPT

## 2022-01-21 PROCEDURE — 36415 COLL VENOUS BLD VENIPUNCTURE: CPT

## 2022-01-21 PROCEDURE — 65270000029 HC RM PRIVATE

## 2022-01-21 PROCEDURE — 74011250637 HC RX REV CODE- 250/637: Performed by: PHYSICIAN ASSISTANT

## 2022-01-21 PROCEDURE — 97165 OT EVAL LOW COMPLEX 30 MIN: CPT

## 2022-01-21 PROCEDURE — A9503 TC99M MEDRONATE: HCPCS

## 2022-01-21 PROCEDURE — G0378 HOSPITAL OBSERVATION PER HR: HCPCS

## 2022-01-21 PROCEDURE — 80053 COMPREHEN METABOLIC PANEL: CPT

## 2022-01-21 PROCEDURE — 74011250636 HC RX REV CODE- 250/636: Performed by: ORTHOPAEDIC SURGERY

## 2022-01-21 RX ORDER — CELECOXIB 200 MG/1
200 CAPSULE ORAL 2 TIMES DAILY
Qty: 60 CAPSULE | Refills: 2 | Status: SHIPPED | OUTPATIENT
Start: 2022-01-21 | End: 2022-01-22

## 2022-01-21 RX ORDER — OXYCODONE HYDROCHLORIDE 5 MG/1
5 TABLET ORAL
Qty: 12 TABLET | Refills: 0 | Status: SHIPPED | OUTPATIENT
Start: 2022-01-21 | End: 2022-01-22

## 2022-01-21 RX ADMIN — ONDANSETRON 4 MG: 4 TABLET, ORALLY DISINTEGRATING ORAL at 10:59

## 2022-01-21 RX ADMIN — ATORVASTATIN CALCIUM 20 MG: 20 TABLET, FILM COATED ORAL at 11:00

## 2022-01-21 RX ADMIN — CELECOXIB 200 MG: 200 CAPSULE ORAL at 21:57

## 2022-01-21 RX ADMIN — SODIUM CHLORIDE, PRESERVATIVE FREE 10 ML: 5 INJECTION INTRAVENOUS at 14:21

## 2022-01-21 RX ADMIN — POLYETHYLENE GLYCOL 3350 17 G: 17 POWDER, FOR SOLUTION ORAL at 10:59

## 2022-01-21 RX ADMIN — LISINOPRIL 10 MG: 10 TABLET ORAL at 11:00

## 2022-01-21 RX ADMIN — OXYCODONE HYDROCHLORIDE 5 MG: 5 TABLET ORAL at 12:37

## 2022-01-21 RX ADMIN — CELECOXIB 200 MG: 200 CAPSULE ORAL at 10:59

## 2022-01-21 RX ADMIN — SODIUM CHLORIDE, PRESERVATIVE FREE 10 ML: 5 INJECTION INTRAVENOUS at 06:13

## 2022-01-21 RX ADMIN — OXYCODONE HYDROCHLORIDE 5 MG: 5 TABLET ORAL at 19:19

## 2022-01-21 RX ADMIN — ASPIRIN 325 MG ORAL TABLET 325 MG: 325 PILL ORAL at 10:59

## 2022-01-21 RX ADMIN — ENOXAPARIN SODIUM 40 MG: 100 INJECTION SUBCUTANEOUS at 10:59

## 2022-01-21 RX ADMIN — ACETAMINOPHEN 1000 MG: 500 TABLET, FILM COATED ORAL at 19:19

## 2022-01-21 RX ADMIN — SODIUM CHLORIDE, PRESERVATIVE FREE 10 ML: 5 INJECTION INTRAVENOUS at 22:31

## 2022-01-21 RX ADMIN — PANTOPRAZOLE SODIUM 20 MG: 20 TABLET, DELAYED RELEASE ORAL at 11:00

## 2022-01-21 RX ADMIN — ACETAMINOPHEN 1000 MG: 500 TABLET, FILM COATED ORAL at 06:02

## 2022-01-21 RX ADMIN — POTASSIUM CHLORIDE 20 MEQ: 1500 TABLET, EXTENDED RELEASE ORAL at 10:59

## 2022-01-21 RX ADMIN — ONDANSETRON 4 MG: 2 INJECTION INTRAMUSCULAR; INTRAVENOUS at 02:52

## 2022-01-21 RX ADMIN — ONDANSETRON 4 MG: 2 INJECTION INTRAMUSCULAR; INTRAVENOUS at 21:57

## 2022-01-21 RX ADMIN — WATER 2 G: 1 INJECTION INTRAMUSCULAR; INTRAVENOUS; SUBCUTANEOUS at 06:02

## 2022-01-21 NOTE — PROGRESS NOTES
PHYSICAL THERAPY EVALUATION  Patient: Yelitza Hale (89 y.o. female)  Date: 1/21/2022  Primary Diagnosis: Right humeral fracture [S42.301A]  Procedure(s) (LRB):  OPEN REDUCTION INTERNAL FIXATION Right Humerus With Bone Biopsy (Right) 1 Day Post-Op   Precautions: fall,NWB RUE    ASSESSMENT  Pt is a 79 y/o F with PMH of breast cancer in remission, HTN, GERD, and diverticulosis presented to [facility] ED 1/19 with c/o acute onset right arm pain/ popping sound after pulling herself out of the bathtub. XR at ED yielded probable pathologic, minimally displaced fracture involving distal third of right humerus. Pt was evaluated by orthopedics and is now s/p open reduction and intramedullary nailing of right humerus fracture and bone biopsy of right humerus fracture with Dr. Joshua Lopes on 1/20/22. Pt has orders to be NWB R UE. Pt received semi-supine in bed upon arrival, on 2L O2 via NC, AXO x4, and agreeable to PT/OT evaluations at this time. Per pt report, pt lives with her daughter and granddaughter in a one-story apartment home on the ground floor, was IND with ADLs and ambulatory without AD at [facility]. Pt reports one other fall back in December. No DME owned at this time other than wearing CPAP at night. Based on current observations, pt presents with deficits in generalized strength/AROM (R UE limited, able to move hand/fingers), bed mobility, static/dynamic sitting balance, static/dynamic standing balance, functional activity tolerance, and coordination impacting overall performance of functional transfers/mobility and gait. Pt educated on NWB status with good understanding verbalized prior to activity. Pt currently requires max A x2 with additional time for rolling and supine>sit with fair sitting balance noted with UE support on bed rail. (Socks donned with total A and clean gown with mod A using compensatory technique (sling re donned max A)with OT).  STS completed to/from EOB to chair with min A x2 with cues for hand placement/safety and  setup in chair at end of evaluation. Overall, pt tolerates session fair with c/o nausea/HA pain rated 8/10 (RN made aware). Pt would benefit from continued skilled PT services to address current impairments and improve IND and safety with self cares and functional transfers/mobility. At this time, PT recommending d/c to IRF once medically appropriate. Other factors to consider for discharge: family support, DME, time since onset, severity of deficits      Patient will benefit from skilled therapy intervention to address the above noted impairments. PLAN :  Recommendations and Planned Interventions: bed mobility training, transfer training, gait training, therapeutic exercises, patient and family training/education, and therapeutic activities      Recommend with staff: can transfer to [facility]. Ax1 with close sup to min assist.Please keep the sling on and have a support under upper arm with pillow    Frequency/Duration: Patient will be followed by physical therapy:  3-5x/week to address goals. Recommendation for discharge: (in order for the patient to meet his/her long term goals)  [facility],[address]     This discharge recommendation:  Has been made in collaboration with the attending provider and/or case management    IF patient discharges home will need the following DME: bedside commode, shower chair, and hemiwalker         SUBJECTIVE:   Patient stated I am ok.     OBJECTIVE DATA SUMMARY:   HISTORY:    Past Medical History:   Diagnosis Date    Cancer ([address]     had breast cancer     Colon polyps 10/1/2010    Diverticulosis 10/1/2010    GERD (gastroesophageal reflux disease)     Hypertension     Unspecified sleep apnea      Past Surgical History:   Procedure Laterality Date    COLONOSCOPY N/A 11/12/2019    COLONOSCOPY performed by Myra Romo MD at [facility] ENDOSCOPY    COLONOSCOPY,MELANIA Moe  1/22/2015         COLONOSCOPY,MELANIA Moe  11/12/2019         ENDOSCOPY, COLON, DIAGNOSTIC      polyps removed    HX BREAST LUMPECTOMY  2000    left breast - chemo,surgery, radiation (partial mastectomy)    HX CHOLECYSTECTOMY      HX HYSTERECTOMY  2000    TAHBSO due to ?cancer - MCV    NM COLSC FLX W/RMVL OF TUMOR POLYP LESION SNARE TQ  3/8/2012            Home Situation  Home Environment: Private residence  # Steps to Enter: 4  Rails to Enter: Yes  Hand Rails : Bilateral  One/Two Story Residence: One story  Living Alone: No  Support Systems: Other Family Member(s) (daughter and granddaughter)  Patient Expects to be Discharged to[de-identified] Home with home health  Current DME Used/Available at Home: CPAP  Tub or Shower Type: Tub/Shower combination    EXAMINATION/PRESENTATION/DECISION MAKING:   Critical Behavior:  Neurologic State: Alert  Orientation Level: Oriented X4  Cognition: Follows commands     Hearing: Auditory  Auditory Impairment: None  Range Of Motion:  AROM: Generally decreased, functional                       Strength:    Strength: Generally decreased, functional                                                Coordination:  Coordination: Generally decreased, functional       Functional Mobility:  Bed Mobility:  Rolling: Maximum assistance  Supine to Sit: Maximum assistance;Assist x2     Scooting: Maximum assistance  Transfers:  Sit to Stand: Minimum assistance;Assist x2  Stand to Sit: Minimum assistance;Assist x2        Bed to Chair: Minimum assistance;Assist x2              Balance:   Sitting: Impaired; With support  Sitting - Static: Fair (occasional)  Sitting - Dynamic: Fair (occasional)  Standing: Impaired; With support  Standing - Static: Fair;Constant support  Standing - Dynamic : Fair;Constant support  Ambulation/Gait Training:  Distance (ft): 3 Feet (ft)  Assistive Device: Other (comment) (HHA)  Ambulation - Level of Assistance:  Moderate assistance     Gait Description (WDL): Exceptions to [facility]                                       Functional Measure:  [address] [facility] [address] [address] AM-PAC [place].Service Clicks         Basic Mobility Inpatient Short Form  How much difficulty does the patient currently have. .. Unable A Lot A Little None   1. Turning over in bed (including adjusting bedclothes, sheets and blankets)? [] 1   [x] 2   [] 3   [] 4   2. Sitting down on and standing up from a chair with arms ( e.g., wheelchair, bedside commode, etc.)   [] 1   [x] 2   [] 3   [] 4   3. Moving from lying on back to sitting on the side of the bed? [] 1   [x] 2   [] 3   [] 4          How much help from another person does the patient currently need. .. Total A Lot A Little None   4. Moving to and from a bed to a chair (including a wheelchair)? [] 1   [x] 2   [] 3   [] 4   5. Need to walk in hospital room? [] 1   [x] 2   [] 3   [] 4   6. Climbing 3-5 steps with a railing? [] 1   [x] 2   [] 3   [] 4   © , Trustees of [facility], under license to [employer]. All rights reserved     Score:  Initial:  Most Recent: X (Date: 2022 )   Interpretation of Tool:  Represents activities that are increasingly more difficult (i.e. Bed mobility, Transfers, Gait). Score 24 23 22-20 19-15 14-10 9-7 6   Modifier CH CI CJ CK CL CM CN         Physical Therapy Evaluation Charge Determination   History Examination Presentation Decision-Making   LOW Complexity : Zero comorbidities / personal factors that will impact the outcome / POC LOW Complexity : 1-2 Standardized tests and measures addressing body structure, function, activity limitation and / or participation in recreation  LOW Complexity : Stable, uncomplicated  Other outcome measures [facility] 6        Based on the above components, the patient evaluation is determined to be of the following complexity level: LOW     Pain Ratin/10   Activity Tolerance:   Good    After treatment patient left in no apparent distress:   Sitting in chair and Call bell within reach .     GOALS:    Problem: Mobility Impaired (Adult and Pediatric)  Goal: *Acute Goals and Plan of Care (Insert Text)  Description: Patient will move from supine to sit and sit to supine , scoot up and down, and roll side to side in bed with moderate assistance  within 7 day(s). Patient will transfer from bed to chair and chair to bed with minimal assistance/contact guard assist using the least restrictive device within 7 day(s). Patient will improve static standing balance to supervision within 1 week(s). Patient will ambulate 25 feet with minimal assistance with least restrictive device within 1 weeks. Outcome: Progressing Towards Goal       COMMUNICATION/EDUCATION:   The patients plan of care was discussed with: Occupational therapist, Registered nurse, and Case management. Fall prevention education was provided and the patient/caregiver indicated understanding., Patient/family have participated as able in goal setting and plan of care. , and Patient/family agree to work toward stated goals and plan of care. Thank you for this referral.  Antolin Kendrick, PT.    Time Calculation: 25 mins

## 2022-01-21 NOTE — CONSULTS
ORTHOPEDIC CONSULT     Patient: Elizabet Bowman MRN: [id number]  SSN: [id number]    YOB: 1945  Age: 78 y.o. Sex: female       Subjective:      Elizabet Bowman is a 78 y.o. female  patient was consulted to us by ED physician for right humerus fracture. The patient states she was pulling herself up out of the bathtub when she felt a pop of her right upper extremity. The patient states she was started feeling tremendous pain at that point. She denies any history of recent falls or trauma to her right upper extremity. The patient is right-hand dominant. Evaluation the emergency room did reveal a fracture of her distal shaft of humerus possible pathologic. Patient does have a history of breast cancer approximately 16 years ago which was treated with radiation and chemotherapy. The patient has had no recurrence since that time. She does state that her mother did pass away with bone cancer and her sister was diagnosed with bone cancer as well. The patient now complaining of mild to moderate pain of her right upper extremity. The pain is exacerbated with any movement of her right upper extremity. She denies any numbness or tingling of her right upper extremity. She denies any injuries to her head or cervical spine.   She denies any other musculoskeletal complaints at this time.          Past Medical History:   Diagnosis Date    Cancer [facility])       had breast cancer     Colon polyps 10/1/2010    Diverticulosis 10/1/2010    GERD (gastroesophageal reflux disease)      Hypertension      Unspecified sleep apnea              Past Surgical History:   Procedure Laterality Date    COLONOSCOPY N/A 11/12/2019     COLONOSCOPY performed by Myra Romo MD at [address] [place] [address],[address]   1/22/2015          COLONOSCOPY,SANDRA FLORES   11/12/2019          ENDOSCOPY, COLON, DIAGNOSTIC         polyps removed    HX BREAST LUMPECTOMY   2000     left breast - chemo,surgery, radiation (partial mastectomy)    HX CHOLECYSTECTOMY        HX HYSTERECTOMY        TAHBSO due to ?cancer - MCV    NH COLSC FLX W/RMVL OF TUMOR POLYP LESION SNARE TQ   3/8/2012                  Family History   Problem Relation Age of Onset    Cancer Mother           bone    Hypertension Mother      Diabetes Sister      Cancer Sister           pancreatic?  Diabetes Brother      Hypertension Father      Cancer Other        Social History      Tobacco Use    Smoking status: Former Smoker       Packs/day: 0.00       Years: 20.00       Pack years: 0.00       Quit date: 10/1/2000       Years since quittin.3    Smokeless tobacco: Never Used   Substance Use Topics    Alcohol use: No              Prior to Admission medications    Medication Sig Start Date End Date Taking? Authorizing Provider   diclofenac EC (VOLTAREN) 75 mg EC tablet Take 1 Tab by mouth two (2) times a day. 19     Zoltan Mueller MD   ondansetron (ZOFRAN ODT) 4 mg disintegrating tablet Take 1 Tab by mouth every eight (8) hours as needed for Nausea. 19     Zoltan Mueller MD   multivitamin (ONE A DAY) tablet Take 1 tablet by mouth daily.       Provider, Historical   omeprazole (PRILOSEC) 20 mg capsule TAKE ONE CAPSULE BY MOUTH DAILY 14     Nabor Massey MD   KLOR-CON M20 20 mEq tablet TAKE ONE TABLET BY MOUTH EVERY DAY 13     Nabor Massey MD   rosuvastatin (CRESTOR) 10 mg tablet Take 1 Tab by mouth daily. 6/3/13     Nabor Massey MD   lisinopril (PRINIVIL, ZESTRIL) 10 mg tablet Take 1 Tab by mouth daily. 6/3/13     Nabor Massey MD   polyethylene glycol [facility]) 17 gram/dose powder Take 17 g by mouth two (2) times a day.  13     Nabor Massey MD   acetaminophen (TYLENOL EXTRA STRENGTH) 500 mg tablet Take  by mouth every six (6) hours as needed.         Provider, Historical   butalbital-acetaminophen-caffeine (FIORICET, ESGIC) -40 mg per tablet Take 1 Tab by mouth every six (6) hours as needed for Pain. 7/17/12     Zara Padilla MD   aspirin (ASPIRIN) 325 mg tablet Take 325 mg by mouth daily.         Provider, Historical               Allergies   Allergen Reactions    Codeine Nausea and Vomiting    Contrast Agent [Iodine] Hives and Itching    Hydrocodone Nausea and Vomiting    Morphine Other (comments)       hallucinations          Review of Systems:  Review of Systems   Constitutional: Negative. HENT: Negative. Eyes: Negative. Respiratory: Negative. Cardiovascular: Negative. Gastrointestinal: Negative. Genitourinary: Negative. Musculoskeletal: Positive for joint pain. Right upper extremity   Skin: Negative. Neurological: Negative. Endo/Heme/Allergies: Negative.     Psychiatric/Behavioral: Positive for depression.            Objective:             Current Facility-Administered Medications   Medication Dose Route Frequency    aspirin tablet 325 mg  325 mg Oral DAILY    atorvastatin (LIPITOR) tablet 20 mg  20 mg Oral DAILY    lisinopriL (PRINIVIL, ZESTRIL) tablet 10 mg  10 mg Oral DAILY    potassium chloride (K-DUR, KLOR-CON M20) SR tablet 20 mEq  20 mEq Oral DAILY    pantoprazole (PROTONIX) tablet 20 mg  20 mg Oral DAILY    sodium chloride (NS) flush 5-40 mL  5-40 mL IntraVENous Q8H    sodium chloride (NS) flush 5-40 mL  5-40 mL IntraVENous PRN    acetaminophen (TYLENOL) tablet 650 mg  650 mg Oral Q6H PRN     Or    acetaminophen (TYLENOL) suppository 650 mg  650 mg Rectal Q6H PRN    polyethylene glycol (MIRALAX) packet 17 g  17 g Oral DAILY PRN    ondansetron (ZOFRAN ODT) tablet 4 mg  4 mg Oral Q8H PRN     Or    ondansetron (ZOFRAN) injection 4 mg  4 mg IntraVENous Q6H PRN    enoxaparin (LOVENOX) injection 40 mg  40 mg SubCUTAneous DAILY    oxyCODONE IR (ROXICODONE) tablet 5 mg  5 mg Oral Q4H PRN    fentaNYL citrate (PF) injection 25 mcg  25 mcg IntraVENous Q4H PRN             Vitals:     01/20/22 0108 01/20/22 0317 01/20/22 0731 01/20/22 0810   BP: (!) 144/70 (!) 151/73 (!) 146/70     Pulse: 72 84 72     Resp: 18 18 18     Temp:   98.3 °F (36.8 °C) 97.8 °F (36.6 °C)     SpO2: 97% 97%   98%   Weight:           Height:                 Alert and oriented x3, No apparent distress     Physical Exam:  Right upper extremity: Shoulder sling is in place. Skin warm dry and intact throughout right upper extremity. No swelling erythema or ecchymosis seen. There is mild tenderness palpation through the distal portion of her right humerus. Radial pulses palpable. Sensation to sharp dull touch was intact throughout right upper extremity.  strength was 4 out of 5. EPL was intact. Cap refill is 2 seconds. No tenderness palpation throughout her right shoulder. Right upper extremity appears neurovascularly intact.     Labs:  CBC:       Recent Labs     01/20/22  0508 01/19/22  1727   WBC 3.5* 5.8   RBC 3.72* 4.07   HGB 11.2* 12.5   HCT 34.1* 39.1   MCV 91.7 96.1   RDW 13.4 18.1*    200      CHEMISTRIES:       Recent Labs     01/20/22  0508 01/19/22  1727    139   K 3.5 3.6    105   CO2 29 28   BUN 21* 19   CREA 0.91 0.88   CA 9.5 9.3   PT/INR:      Recent Labs     01/19/22  1727   INR 0.9      APTT:No results for input(s): APTT in the last 72 hours. LIVER PROFILE:  Recent Labs     01/19/22  1727   AST 33   ALT 30         IMAGING:  X-rays taken of her right humerus show a transverse mildly displaced transverse  Fracture of the distal third of her humerus pathologic appearance in nature. No other fracture seen. Glenohumeral joint remains intact.     X-rays taken of her right forearm show no acute fractures. Assessment/Plan:                  Hospital Problems  Date Reviewed: 11/11/2019           Codes Class Noted POA     Right humeral fracture ICD-10-CM: Y99.684Y  ICD-9-CM: 807. Plan/recommendations:  -X-rays and clinical findings discussed with the patient and her son  -Most probable pathological fracture discussed.   I discussed that we will involve medical oncologist to help us direct further medical treatment as well as find primary source for the metastasis. -Meanwhile I will order CT chest, abdomen and pelvis along with bone scan. Patient is allergic to contrast so CT scan has been ordered without contrast, will leave up to radiologist  -We discussed treatment for fracture of the humerus. Patient has significant pain so she is requesting further fixing early. Discussing with patient's son, he was very clear that his mother and they have decided if this is going to be cancer then they do not want to fight against it. They just wish to get fracture fixed so her pain improves. -I discussed that we will still have to find primary cause so all investigations will be done and then she can discuss with medical oncologist if they would like to do any specific treatment for primary malignancy  -I also discussed that we will obtain biopsy during surgery  -Discussion of risk and benefits of surgery were done. Possible complications including but not limited to infection, nerve or vessels injury, nonunion of the fracture, stiffness of elbow or shoulder, failure of fixation and blood clot discussed. I also discussed spread of malignancy.  -Discussion was understood and they agreed to proceed with procedure.   We will proceed with intramedullary nailing and biopsy of the right humerus

## 2022-01-21 NOTE — ANESTHESIA POSTPROCEDURE EVALUATION
Procedure(s):  OPEN REDUCTION INTERNAL FIXATION Right Humerus With Bone Biopsy.     general    Anesthesia Post Evaluation      Multimodal analgesia: multimodal analgesia used between 6 hours prior to anesthesia start to PACU discharge  Patient location during evaluation: PACU  Patient participation: complete - patient participated  Level of consciousness: awake  Pain score: 0  Pain management: adequate  Airway patency: patent  Anesthetic complications: no  Cardiovascular status: acceptable  Respiratory status: acceptable  Hydration status: acceptable  Post anesthesia nausea and vomiting:  controlled  Final Post Anesthesia Temperature Assessment:  Normothermia (36.0-37.5 degrees C)      INITIAL Post-op Vital signs:   Vitals Value Taken Time   /97 01/20/22 2231   Temp 36.2 °C (97.2 °F) 01/20/22 2231   Pulse 89 01/20/22 2231   Resp 17 01/20/22 2231   SpO2 99 % 01/20/22 2231

## 2022-01-21 NOTE — DISCHARGE SUMMARY
Admit date: 1/19/2022   Admitting Provider: Reji Hudson MD    Discharge date: 1/21/2022  Discharging Provider: Devorah Gay PA-C      * Admission Diagnoses: Right humeral fracture [S42.301A]    * Discharge Diagnoses:    Hospital Problems as of 1/21/2022 Date Reviewed: 11/11/2019          Codes Class Noted - Resolved POA    Right humeral fracture ICD-10-CM: S42.301A  ICD-9-CM: 812.20  1/19/2022 - Present Unknown              * Hospital Course:   79 yo female who presented to the ED after acute onset of R arm pain after pulling herself out of the bathtub and hearing a pop with pain. XR yielded probable pathologic fracture, minimally displaced fracture involving distal third of right humerus. Patient taken to the operating room for open reduction and intramedullary nailing of the right humerus fracture. Bone biopsy was performed and pathology is pending. Patient will be discharged on oxycodone for pain control. Upon orthopedic clearance patient may be discharged with home health services for rehab or inpatient rehab facility. * Procedures:   Procedure(s):  OPEN REDUCTION INTERNAL FIXATION Right Humerus With Bone Biopsy      Consults:   Orthopedics    Significant Diagnostic Studies: As discussed in hospital course    Discharge Exam:  Visit Vitals  /64 (BP 1 Location: Right upper arm, BP Patient Position: At rest)   Pulse 72   Temp 97.7 °F (36.5 °C)   Resp 16   Ht 5' 1.02\" (1.55 m)   Wt 113.4 kg (250 lb)   SpO2 95%   BMI 47.20 kg/m²     PHYSICAL EXAM:  Constitutional: Alert in no acute distress   HEENT: Sclerae anicteric, The neck is supple. Cardiovascular: Regular rate and rhythm. No murmurs, gallops, or rubs. Anthonette Sajan Respiratory: Clear breath sounds with no wheezes, rales, or rhonchi. GI: Abdomen nondistended, soft, and nontender. Normal active bowel sounds. Rectal: Deferred   Musculoskeletal: Right arm in a sling with no obvious deformity. Surgical scars are without dehiscence.   Right hand warm to the touch with motor and sensation present   neurological:  Patient is alert and oriented. Cranial nerves II-XII intact  Psychiatric: Mood appears appropriate with judgement intact. Lymphatic: No cervical or supraclavicular adenopathy. Skin: No rashes or breakdown of the skin      * Discharge Condition: stable  * Disposition: Rehab    Discharge Medications:  Current Discharge Medication List      START taking these medications    Details   celecoxib (CELEBREX) 200 mg capsule Take 1 Capsule by mouth two (2) times a day for 90 days. Qty: 60 Capsule, Refills: 2  Start date: 1/21/2022, End date: 4/21/2022      oxyCODONE IR (ROXICODONE) 5 mg immediate release tablet Take 1 Tablet by mouth every four (4) hours as needed for Pain for up to 3 days. Max Daily Amount: 30 mg.  Qty: 12 Tablet, Refills: 0  Start date: 1/21/2022, End date: 1/24/2022    Associated Diagnoses: Closed displaced fracture of medial epicondyle of right humerus, unspecified fracture morphology, initial encounter         CONTINUE these medications which have NOT CHANGED    Details   diclofenac EC (VOLTAREN) 75 mg EC tablet Take 1 Tab by mouth two (2) times a day. Qty: 20 Tab, Refills: 0      ondansetron (ZOFRAN ODT) 4 mg disintegrating tablet Take 1 Tab by mouth every eight (8) hours as needed for Nausea. Qty: 20 Tab, Refills: 0      multivitamin (ONE A DAY) tablet Take 1 tablet by mouth daily. omeprazole (PRILOSEC) 20 mg capsule TAKE ONE CAPSULE BY MOUTH DAILY  Qty: 30 Cap, Refills: 0    Comments: Needs appointment      KLOR-CON M20 20 mEq tablet TAKE ONE TABLET BY MOUTH EVERY DAY  Qty: 30 Tab, Refills: 4      rosuvastatin (CRESTOR) 10 mg tablet Take 1 Tab by mouth daily. Qty: 30 Tab, Refills: 4    Associated Diagnoses: Hypercholesteremia      lisinopril (PRINIVIL, ZESTRIL) 10 mg tablet Take 1 Tab by mouth daily.   Qty: 30 Tab, Refills: 1    Associated Diagnoses: HTN (hypertension)      polyethylene glycol (MIRALAX) 17 gram/dose powder Take 17 g by mouth two (2) times a day. Qty: 510 g, Refills: 3    Associated Diagnoses: Diverticulosis      acetaminophen (TYLENOL EXTRA STRENGTH) 500 mg tablet Take  by mouth every six (6) hours as needed. butalbital-acetaminophen-caffeine (FIORICET, ESGIC) -40 mg per tablet Take 1 Tab by mouth every six (6) hours as needed for Pain. Qty: 30 Tab, Refills: 0    Associated Diagnoses: HA (headache)      aspirin (ASPIRIN) 325 mg tablet Take 325 mg by mouth daily. * Follow-up Care/Patient Instructions:   Activity: Activity as tolerated  Diet: Cardiac diet  Wound Care: Keep bandage clean    Follow-up Information     Follow up With Specialties Details Why Contact Info    Yovana Lopez MD Internal Medicine In 1 week  [address] [place] [address]  [phone]      Jessi Giron MD Orthopedic Surgery In 2 weeks  [address]  [phone]            Discharge summary greater than 35 minutes spent with the patient performing discharge instructions, medication review and physical exam    Signed:  Bhaskar Babcock PA-C  1/21/2022  11:16 AM

## 2022-01-21 NOTE — PROGRESS NOTES
Problem: Pain  Goal: *Control of Pain  Outcome: Progressing Towards Goal  Note: Patient is reporting that her pain is being controlled. Pain will continue to be assessed and treated as needed. Bedside shift change report given to Roxi Burgess RN (oncoming nurse) by Dwaine Skaggs RN (offgoing nurse). Report included the following information SBAR, Kardex, Procedure Summary, Intake/Output, MAR, Accordion and Cardiac Rhythm NSR.

## 2022-01-21 NOTE — ANESTHESIA PREPROCEDURE EVALUATION
Anesthetic History   No history of anesthetic complications            Review of Systems / Medical History  Patient summary reviewed, nursing notes reviewed and pertinent labs reviewed    Pulmonary        Sleep apnea  Undiagnosed apnea      Comments: Former smoker   Neuro/Psych   Within defined limits           Cardiovascular    Hypertension: well controlled          Hyperlipidemia    Exercise tolerance: <4 METS     GI/Hepatic/Renal     GERD: well controlled           Endo/Other        Morbid obesity and cancer     Other Findings   Comments: Breast cancer  Colon polyps   Diverticulosis         Physical Exam    Airway  Mallampati: III  TM Distance: 4 - 6 cm  Neck ROM: normal range of motion   Mouth opening: Normal     Cardiovascular  Regular rate and rhythm,  S1 and S2 normal,  no murmur, click, rub, or gallop  Rhythm: regular  Rate: normal         Dental    Dentition: Upper partial plate and Poor dentition     Pulmonary      Decreased breath sounds: bibasilar           Abdominal  GI exam deferred       Other Findings            Anesthetic Plan    ASA: 3, emergent  Anesthesia type: general          Induction: Intravenous  Anesthetic plan and risks discussed with: Patient

## 2022-01-21 NOTE — PROGRESS NOTES
CM has reviewed chart. Patient waiting surgery clearance and PT/OT recommendations. Likely will need SNF/IRF. Family to come in today to discuss discharge plan. Profile made in [facility]. DC plan is likely SNF/IRF    1300: Patient off floor for testing. Family coming up to see patient and discuss discharge plan. Awaiting to speak with family. 1510: spoke with patient about discharge plan. Patient stated she wants to go home and doesn't ant any IRF or HH. Stated she will set that up herself. Explained to her that I can send out referrals. She gave no pref for choice and ok with me sending referrals but still unsure if she wants therapy. Referrals sent out.

## 2022-01-21 NOTE — PROGRESS NOTES
Orthopedic progress note    Date:2022       Room:[facility] [place]  Patient Name:Pooja Hurst     YOB: 1945     Age:76 y.o. Subjective    61-year-old female status post ORIF right distal humerus with bone biopsy. Postop day #1. Patient doing well. She is complaining of feeling tired today. She complains of mild pain of her right upper extremity. She does feel pain medication helps. No other complaints at this time. Objective           Vitals Last 24 Hours:  TEMPERATURE:  Temp  Av.7 °F (36.5 °C)  Min: 97.2 °F (36.2 °C)  Max: 98 °F (36.7 °C)  RESPIRATIONS RANGE: Resp  Av.2  Min: 14  Max: 18  PULSE OXIMETRY RANGE: SpO2  Av.1 %  Min: 93 %  Max: 99 %  PULSE RANGE: Pulse  Av.2  Min: 68  Max: 92  BLOOD PRESSURE RANGE: Systolic (88UJX), FTW:897 , Min:125 , UTV:680   ; Diastolic (36OYW), VGM:95, Min:60, Max:102    Current Facility-Administered Medications   Medication Dose Route Frequency    acetaminophen (TYLENOL) tablet 1,000 mg  1,000 mg Oral Q6H    celecoxib (CELEBREX) capsule 200 mg  200 mg Oral BID    morphine injection 1 mg  1 mg IntraVENous Q4H PRN    aspirin tablet 325 mg  325 mg Oral DAILY    atorvastatin (LIPITOR) tablet 20 mg  20 mg Oral DAILY    lisinopriL (PRINIVIL, ZESTRIL) tablet 10 mg  10 mg Oral DAILY    potassium chloride (K-DUR, KLOR-CON M20) SR tablet 20 mEq  20 mEq Oral DAILY    pantoprazole (PROTONIX) tablet 20 mg  20 mg Oral DAILY    sodium chloride (NS) flush 5-40 mL  5-40 mL IntraVENous Q8H    sodium chloride (NS) flush 5-40 mL  5-40 mL IntraVENous PRN    polyethylene glycol (MIRALAX) packet 17 g  17 g Oral DAILY PRN    ondansetron (ZOFRAN ODT) tablet 4 mg  4 mg Oral Q8H PRN    Or    ondansetron (ZOFRAN) injection 4 mg  4 mg IntraVENous Q6H PRN    enoxaparin (LOVENOX) injection 40 mg  40 mg SubCUTAneous DAILY    oxyCODONE IR (ROXICODONE) tablet 5 mg  5 mg Oral Q4H PRN      Review of Systems   Constitutional: Negative for malaise/fatigue. Respiratory: Negative for cough, shortness of breath and wheezing. Cardiovascular: Negative for chest pain and palpitations. Gastrointestinal: Negative for abdominal pain, heartburn, nausea and vomiting. Neurological: Negative for headaches. Musculoskeletal: Denies any numbness/tingling of operative extremity    I/O (24Hr): Intake/Output Summary (Last 24 hours) at 1/21/2022 1345  Last data filed at 1/20/2022 2301  Gross per 24 hour   Intake 1400 ml   Output 300 ml   Net 1100 ml     Objective  Labs/Imaging/Diagnostics    Labs:  CBC:  Recent Labs     01/21/22  0559 01/20/22  0508 01/19/22  1727   WBC 5.7 3.5* 5.8   RBC 3.40* 3.72* 4.07   HGB 10.2* 11.2* 12.5   HCT 31.7* 34.1* 39.1   MCV 93.2 91.7 96.1   RDW 13.7 13.4 18.1*    172 200     CHEMISTRIES:  Recent Labs     01/21/22  0559 01/20/22  0508 01/19/22  1727    140 139   K 4.0 3.5 3.6    104 105   CO2 28 29 28   BUN 25* 21* 19   CREA 0.96 0.91 0.88   CA 9.1 9.5 9.3   PT/INR:  Recent Labs     01/19/22  1727   INR 0.9     APTT:No results for input(s): APTT in the last 72 hours. LIVER PROFILE:  Recent Labs     01/21/22  0559 01/19/22  1727   AST 30 33   ALT 38 30     Lab Results   Component Value Date/Time    ALT (SGPT) 38 01/21/2022 05:59 AM    AST (SGOT) 30 01/21/2022 05:59 AM    Alk. phosphatase 56 01/21/2022 05:59 AM    Bilirubin, total 0.4 01/21/2022 05:59 AM       Physical Exam:  Right upper extremity: Dressings clean dry and intact. Shoulder sling in place. Good range of motion of fingers of right hand without discomfort.  strength is 5 out of 5. EPL intact. Radial pulse palpable. Cap refill is 2 seconds. Right upper extremity appears neurovascularly intact.     Assessment//Plan           Patient Active Problem List    Diagnosis Date Noted    Right humeral fracture 01/19/2022    Hypokalemia 09/19/2012    Dyslipidemia 12/02/2011    Colon polyps 10/01/2010    Diverticulosis 10/01/2010    HTN (hypertension) 10/01/2010    GERD (gastroesophageal reflux disease) 10/01/2010     Status post ORIF right distal humerus pathologic fracture with bone biopsy. Postop day #1. Continue with therapy as tolerated. I have placed a consult with oncology, Dr. Nella Javier to evaluate tumor with possible staging and identification. Orthopedics will follow.       Electronically signed by Mendy Mello PA-C on 1/21/2022 at 1:45 PM

## 2022-01-21 NOTE — CONSULTS
Hematology and Oncology Inpatient Consult Note     Patient: Mary Alice Mosley MRN: [id number]  SSN: [id number]    YOB: 1945  Age: 78 y.o. Sex: female    Chief Complaint: Patient came with right arm pain    Reason for consult: Evaluation and management for patient with pathological fracture of right humerus    Subjective:      Mary Alice Mosley is a 78 y. o. white female who was trying to get out of bathtub and heard a pop in her right and was in extreme pain. She was found to have right humeral fracture and she had internal fixation by Dr. Michael Newton yesterday. It was felt that patient fracture was pathological.  She had a fracture without any significant trauma. Patient has slight discomfort on the right and she is obviously worried about her overall condition. Patient has history of breast cancer around 2005. Patient had surgery by Dr. Ana Cary at [facility]. After surgery she had radiation therapy and chemotherapy. She could not remember name of her medical oncologist.  She is not sure that she was on any hormonal medication or not. She was going for annual checkup at [facility] and her last exam was about 6 months ago. According to patient she was given report that she had no recurrence of her breast cancer.   Currently she has no palpable masses on her self breast exam.    Past Medical History:   Diagnosis Date    Cancer [facility])     had breast cancer     Colon polyps 10/1/2010    Diverticulosis 10/1/2010    GERD (gastroesophageal reflux disease)     Hypertension     Unspecified sleep apnea    -Breast cancer which was treated with lumpectomy followed by radiation therapy and chemotherapy  Past Surgical History:   Procedure Laterality Date    COLONOSCOPY N/A 11/12/2019    COLONOSCOPY performed by Irma Ronquillo MD at [address] [place] [address],[address]  1/22/2015         COLONOSCOPY,MELANIA Sauer  11/12/2019         ENDOSCOPY, COLON, DIAGNOSTIC      polyps removed    HX BREAST LUMPECTOMY      left breast - chemo,surgery, radiation (partial mastectomy)    HX CHOLECYSTECTOMY      HX HYSTERECTOMY      TAHBSO due to ?cancer - MCV    [place] COLSC FLX W/RMVL OF TUMOR POLYP LESION SNARE TQ  3/8/2012           Family History   Problem Relation Age of Onset    Cancer Mother         bone    Hypertension Mother     Diabetes Sister     Cancer Sister         pancreatic?     Diabetes Brother     Hypertension Father     Cancer Other      Social History     Tobacco Use    Smoking status: Former Smoker     Packs/day: 0.00     Years: 20.00     Pack years: 0.00     Quit date: 10/1/2000     Years since quittin.3    Smokeless tobacco: Never Used   Substance Use Topics    Alcohol use: No      Current Facility-Administered Medications   Medication Dose Route Frequency Provider Last Rate Last Admin    acetaminophen (TYLENOL) tablet 1,000 mg  1,000 mg Oral Q6H Brittany Mosley PA-C   1,000 mg at 22 0602    celecoxib (CELEBREX) capsule 200 mg  200 mg Oral BID Brittany Mosley PA-C   200 mg at 22 2311    morphine injection 1 mg  1 mg IntraVENous Q4H PRN Ayo Pineda MD        aspirin tablet 325 mg  325 mg Oral DAILY Ramon Cortés MD   325 mg at 22 1024    atorvastatin (LIPITOR) tablet 20 mg  20 mg Oral DAILY Ramon Cortés MD   20 mg at 22 1024    lisinopriL (PRINIVIL, ZESTRIL) tablet 10 mg  10 mg Oral DAILY Ramon Cortés MD   10 mg at 22 1024    potassium chloride (K-DUR, KLOR-CON M20) SR tablet 20 mEq  20 mEq Oral DAILY Ramon Cortés MD   20 mEq at 22 1024    pantoprazole (PROTONIX) tablet 20 mg  20 mg Oral DAILY Ramon Cortés MD   20 mg at 22 1024    sodium chloride (NS) flush 5-40 mL  5-40 mL IntraVENous Q8H Ramon Cortés MD   10 mL at 22 8168    sodium chloride (NS) flush 5-40 mL  5-40 mL IntraVENous PRN Ramon Cortés MD        polyethylene glycol (MIRALAX) packet 17 g  17 g Oral DAILY PRN Moo MD Ramon        ondansetron (ZOFRAN ODT) tablet 4 mg  4 mg Oral Q8H PRN Ramon Cortés MD        Or    ondansetron (ZOFRAN) injection 4 mg  4 mg IntraVENous Q6H PRN Ramon Cortés MD   4 mg at 01/21/22 0252    enoxaparin (LOVENOX) injection 40 mg  40 mg SubCUTAneous DAILY Ramon Cortés MD   40 mg at 01/20/22 1024    oxyCODONE IR (ROXICODONE) tablet 5 mg  5 mg Oral Q4H PRN Brittany Mosley PA-C   5 mg at 01/20/22 1024        Allergies   Allergen Reactions    Codeine Nausea and Vomiting    Contrast Agent [Iodine] Hives and Itching    Hydrocodone Nausea and Vomiting    Morphine Other (comments)     hallucinations        Review of Systems:  CONSTITUTIONAL: No fever, no chills. No repeated infections. No night sweats. Patient feels tired  HEENT: No mouth sores. No Epistaxis. She does have hearing impairment. No change in taste or smell sensations. CARDIOVASCULAR: No  palpitations or chest pain. No edema. No syncope. RESPIRATORY: No cough. Has slight dyspnea on exertion. No Hemoptysis. No wheezing. No hoarseness of voice. GI: No nausea or vomiting. No diarrhea, constipation, no bright red blood per rectum. No hematemesis or melena. No weight loss. No dysphagia. : No dysuria, no hematuria. No frequency of urination. INTEGUMENTARY: No skin rash or palpable lumps or bumps. HEMATOLOGIC: No history of easy bruisability. No gingival bleeding  NEURO: No focal weakness, No paresthesia. No headache or seizures. MUSCULOSKELETAL: She has right arm pain. She also has some back pain. Objective:     Vitals:    01/20/22 2245 01/20/22 2301 01/21/22 0259 01/21/22 0528   BP: 135/76 125/60 (!) 153/102    Pulse: 86 73 75 68   Resp: 16 17 16    Temp:  97.7 °F (36.5 °C) 97.9 °F (36.6 °C)    SpO2: 96% 93% 95%    Weight:       Height:            Physical Exam:  Constitutional: Elderly white female looks chronically ill. Not in any acute distress or pain. Eyes: Sclerae anicteric. Conjunctivae no pallor.   ENMT: Oral mucosa is moist, no thrush, mucositis, or petechiae. Neck: No adenopathy. Respiratory: Lungs are clear bilaterally. Cardiovascular: Normal sinus rhythm. Abdomen: Obese. Soft, nontender, no hepatosplenomegaly. No guarding or rigidity. Bowel sounds present. Back/Spine: No spinal tenderness. Extremities: No edema. Her right upper extremity is in the sling  Skin: No petechiae; no skin rash. Neurologic: Alert/oriented x 3. I did not do breast examination because patient had a surgery yesterday in her right upper extremity is in the sling. It is also difficult for her to move around to do proper full breast exam.    Recent Results (from the past 24 hour(s))   COVID-19 RAPID TEST    Collection Time: 01/20/22  3:46 PM   Result Value Ref Range    Specimen source Nasopharyngeal      COVID-19 rapid test Not Detected Not Detected     CBC WITH AUTOMATED DIFF    Collection Time: 01/21/22  5:59 AM   Result Value Ref Range    WBC 5.7 3.6 - 11.0 K/uL    RBC 3.40 (L) 3.80 - 5.20 M/uL    HGB 10.2 (L) 11.5 - 16.0 g/dL    HCT 31.7 (L) 35.0 - 47.0 %    MCV 93.2 80.0 - 99.0 FL    MCH 30.0 26.0 - 34.0 PG    MCHC 32.2 30.0 - 36.5 g/dL    RDW 13.7 11.5 - 14.5 %    PLATELET 425 412 - 344 K/uL    MPV 10.5 8.9 - 12.9 FL    NRBC 0.0 0.0  WBC    ABSOLUTE NRBC 0.00 0.00 - 0.01 K/uL    NEUTROPHILS 89 (H) 32 - 75 %    LYMPHOCYTES 6 (L) 12 - 49 %    MONOCYTES 5 5 - 13 %    EOSINOPHILS 0 0 - 7 %    BASOPHILS 0 0 - 1 %    IMMATURE GRANULOCYTES 0 0 - 0.5 %    ABS. NEUTROPHILS 5.1 1.8 - 8.0 K/UL    ABS. LYMPHOCYTES 0.3 (L) 0.8 - 3.5 K/UL    ABS. MONOCYTES 0.3 0.0 - 1.0 K/UL    ABS. EOSINOPHILS 0.0 0.0 - 0.4 K/UL    ABS. BASOPHILS 0.0 0.0 - 0.1 K/UL    ABS. IMM. GRANS. 0.0 0.00 - 0.04 K/UL    DF AUTOMATED          XR FLUOROSCOPY UNDER 60 MINUTES   Final Result      XR CHEST PORT   Final Result   No evidence of an acute cardiopulmonary process.       XR SHOULDER RT AP/LAT MIN 2 V   Final Result   Probable pathologic, minimally displaced fracture involving the   distal third of the right humerus. XR HUMERUS RT   Final Result   Probable pathologic, minimally displaced fracture involving the   distal third of the right humerus. XR FOREARM RT AP/LAT   Final Result   There is a subtle indentation in the soft tissues of the proximal   forearm adjacent to the proximal third of the radius on the lateral projection. Cannot exclude a laceration. There is no radiopaque foreign body. No displaced   fracture involving the radius or ulna. Degenerative changes of the distal   radius. CT HUMERUS RT WO CONT    (Results Pending)   NM BONE SCAN [address] BODY    (Results Pending)   CT CHEST WO CONT    (Results Pending)   CT ABD PELV WO CONT    (Results Pending)          Assessment:     Hospital Problems  Date Reviewed: 11/11/2019          Codes Class Noted POA    Right humeral fracture ICD-10-CM: S42.301A  ICD-9-CM: 812.20  1/19/2022 Unknown              Assessment & Plan:     68-year-old white female who has history of left-sided breast cancer in 2005. Patient had surgery by Dr. Kiki Ball followed by chemotherapy and radiation therapy. Patient do not remember name of her medical oncologist.  She also do not remember her chemotherapy agents name. She also do not recall whether she had a hormonal manipulation or not. -Patient was admitted with severe pain in the right with trivial injury and was found to have right humeral fracture which is suggestive of pathological fracture. I have reviewed her imaging of her right upper extremity and also reviewed Dr. Farrah Workman impression and note. I also reviewed operative note. Biopsy from her right humeral pathological fracture has been sent and we are waiting for final pathology report. Given her clinical picture and her history of breast cancer most likely this may represent metastatic breast cancer.   Of course it is also possible that her she may have second malignancy.  -Of course at this point her priority is to recover from her right humeral fracture. She had surgery and will go for rehab. Once we have pathological report back then we can make more definitive oncologic recommendations. I will also request records from [facility].  -If patient gets discharged I will continue to follow her outpatient basis. -Patient does have anemia and I will start anemia work-up. This dictation was done by dragon, computer voice recognition software. Often unanticipated grammatical, syntax, phones and other interpretive errors are inadvertently transcribed. Please excuse errors that have escaped final proofreading.      Signed By: Yg Rocha MD     January 21, 2022

## 2022-01-21 NOTE — PROGRESS NOTES
Son given an update on patient condition. He would like a call from case management regarding a hospital bed and hospice versus home health pending the results of her tests and consults.

## 2022-01-21 NOTE — PROGRESS NOTES
OCCUPATIONAL THERAPY EVALUATION  Patient: Nik Barcenas (56 y.o. female)  Date: 1/21/2022  Primary Diagnosis: Right humeral fracture [S42.301A]  Procedure(s) (LRB):  OPEN REDUCTION INTERNAL FIXATION Right Humerus With Bone Biopsy (Right) 1 Day Post-Op   Precautions: fall risk, NWB R UE      ASSESSMENT  Pt is a 77 y/o F with PMH of breast cancer in remission, HTN, GERD, and diverticulosis presented to [facility] ED 1/19 with c/o acute onset right arm pain/ popping sound after pulling herself out of the bathtub. XR at ED yielded probable pathologic, minimally displaced fracture involving distal third of right humerus. Pt was evaluated by orthopedics and is now s/p open reduction and intramedullary nailing of right humerus fracture and bone biopsy of right humerus fracture with Dr. Quintin Gaitan on 1/20/22. Pt has orders to be NWB R UE. Pt received semi-supine in bed upon arrival, on 2L O2 via NC, AXO x4, and agreeable to OT/PT evaluations at this time. Per pt report, pt lives with her daughter and granddaughter in a one-story apartment home on the ground floor, was IND with ADLs and ambulatory without AD at [facility]. Pt reports one other fall back in December. No DME owned at this time other than wearing CPAP at night. Based on current observations, pt presents with deficits in generalized strength/AROM (R UE limited, able to move hand/fingers), bed mobility, static/dynamic sitting balance, static/dynamic standing balance, functional activity tolerance, and coordination impacting overall performance of ADLs and functional transfers/mobility. Pt educated on NWB status with good understanding verbalized prior to activity. Pt currently requires max A x2 with additional time for rolling and supine>sit with fair sitting balance noted with UE support on bed rail. Socks donned with total A and clean gown with mod A using compensatory technique (sling re donned max A).  STS completed to/from EOB to chair with min A x2 with cues for hand placement/safety and basic grooming (face washing) s/p setup in chair at end of evaluation. Overall, pt tolerates session fair with c/o nausea/HA pain rated 8/10 (RN made aware). Pt would benefit from continued skilled OT services to address current impairments and improve IND and safety with self cares and functional transfers/mobility. At this time, OT recommending d/c to IRF once medically appropriate. Other factors to consider for discharge: family support, DME, time since onset, severity of deficits      Patient will benefit from skilled therapy intervention to address the above noted impairments. PLAN :  Recommendations and Planned Interventions: self care training, functional mobility training, therapeutic exercise, balance training, therapeutic activities, endurance activities, neuromuscular re-education, patient education, and family training/education    Frequency/Duration: Patient will be followed by occupational therapy:  3-5x/week to address goals.     Recommendation for discharge: (in order for the patient to meet his/her long term goals)  [address]     This discharge recommendation:  Has been made in collaboration with the attending provider and/or case management       SUBJECTIVE:   Patient stated I dress myself at home    OBJECTIVE DATA SUMMARY:   HISTORY:   Past Medical History:   Diagnosis Date    Cancer ([facility] [address]     had breast cancer     Colon polyps 10/1/2010    Diverticulosis 10/1/2010    GERD (gastroesophageal reflux disease)     Hypertension     Unspecified sleep apnea      Past Surgical History:   Procedure Laterality Date    COLONOSCOPY N/A 11/12/2019    COLONOSCOPY performed by Jackie Rosario MD at [facility] ENDOSCOPY    COLONOSCOPY,MELANIA Solorio  1/22/2015         COLONOSCOPY,MELANIA Solorio  11/12/2019         ENDOSCOPY, COLON, DIAGNOSTIC      polyps removed    HX BREAST LUMPECTOMY  2000    left breast - chemo,surgery, radiation (partial mastectomy)    HX CHOLECYSTECTOMY      HX HYSTERECTOMY  2000    TAHBSO due to ?cancer - MCV    MS COLSC FLX W/RMVL OF TUMOR POLYP LESION SNARE TQ  3/8/2012            Expanded or extensive additional review of patient history:     Home Situation  Home Environment: Private residence  # Steps to Enter: 4  Rails to Enter: Yes  Hand Rails : Bilateral  One/Two Story Residence: One story  Living Alone: No  Support Systems: Other Family Member(s) (daughter and granddaughter)  Patient Expects to be Discharged to[de-identified] Home with home health  Current DME Used/Available at Home: CPAP  Tub or Shower Type: Tub/Shower combination    Hand dominance: Right    EXAMINATION OF PERFORMANCE DEFICITS:  Cognitive/Behavioral Status:  Neurologic State: Alert  Orientation Level: Disoriented to place  Cognition: Follows commands      Hearing: Auditory  Auditory Impairment: None    Range of Motion:  AROM: Generally decreased, functional (R UE limited, in sling. Able to move fingers)                         Strength:  Strength: Generally decreased, functional                Coordination:  Coordination: Generally decreased, functional  Fine Motor Skills-Upper: Right Impaired;Left Intact    Gross Motor Skills-Upper: Right Impaired;Left Intact    Balance:  Sitting: Impaired; With support  Sitting - Static: Fair (occasional)  Sitting - Dynamic: Fair (occasional)  Standing: Impaired; With support  Standing - Static: Fair;Constant support  Standing - Dynamic : Fair;Constant support    Functional Mobility and Transfers for ADLs:  Bed Mobility:  Rolling: Maximum assistance  Supine to Sit: Maximum assistance;Assist x2  Scooting: Maximum assistance    Transfers:  Sit to Stand: Minimum assistance;Assist x2  Stand to Sit: Minimum assistance;Assist x2  Bed to Chair: Minimum assistance;Assist x2      ADL Intervention and task modifications:  Feeding  Container Management: Set-up    Grooming  Grooming Assistance: Set-up; Stand-by assistance  Position Performed: Seated in chair  Washing Face: Set-up; Stand-by assistance              Upper Body Dressing Assistance  Orthotics(Brace): Maximum assistance  Hospital Gown: Moderate assistance    Lower Body Dressing Assistance  Socks: Total assistance (dependent)  Position Performed: Seated edge of bed      Therapeutic Exercise:  Pt educated on UE HEP to complete throughout the day to improve ROM and strength with good understanding verbalized and demonstrated. Functional Measure:    [employer] AM-PACTM \"6 Clicks\"                                                       Daily Activity Inpatient Short Form  How much help from another person does the patient currently need. .. Total; A Lot A Little None   1. Putting on and taking off regular lower body clothing? [x]  1 []  2 []  3 []  4   2. Bathing (including washing, rinsing, drying)? []  1 [x]  2 []  3 []  4   3. Toileting, which includes using toilet, bedpan or urinal? [x] 1 []  2 []  3 []  4   4. Putting on and taking off regular upper body clothing? []  1 [x]  2 []  3 []  4   5. Taking care of personal grooming such as brushing teeth? []  1 []  2 [x]  3 []  4   6. Eating meals? []  1 []  2 [x]  3 []  4   © 2007, Trustees of [facility] [employer], under license to [employer]. All rights reserved     Score: 12/24     Interpretation of Tool:  Represents clinically-significant functional categories (i.e. Activities of daily living).   Percentage of Impairment CH    0%   CI    1-19% CJ    20-39% CK    40-59% CL    60-79% CM    80-99% CN     100%   [facility]  Score 6-24 24 23 20-22 15-19 10-14 7-9 6         Occupational Therapy Evaluation Charge Determination   History Examination Decision-Making   LOW Complexity : Brief history review  LOW Complexity : 1-3 performance deficits relating to physical, cognitive , or psychosocial skils that result in activity limitations and / or participation restrictions  MEDIUM Complexity : Patient may present with comorbidities that affect occupational performnce. Miniml to moderate modification of tasks or assistance (eg, physical or verbal ) with assesment(s) is necessary to enable patient to complete evaluation       Based on the above components, the patient evaluation is determined to be of the following complexity level: LOW   Pain Ratin/10 headache     Activity Tolerance:   Fair and requires rest breaks    After treatment patient left in no apparent distress:    Sitting in chair, Heels elevated for pressure relief, and Call bell within reach, RUE in sling and elevated on pillow    COMMUNICATION/EDUCATION:   The patients plan of care was discussed with: Physical therapist, Registered nurse, and Case management. Patient/family have participated as able in goal setting and plan of care. and Patient/family agree to work toward stated goals and plan of care. This patients plan of care is appropriate for delegation to [facility]. OT/PT sessions occurred together for increased patient and clinician safety as pt with decreased activity tolerance and requires A of 2 for mobility at this time.      Thank you for this referral.  Jyothi Lopez  Time Calculation: 31 mins    Problem: Self Care Deficits Care Plan (Adult)  Goal: *Acute Goals and Plan of Care (Insert Text)  Description: Pt will be Mod I sup <> sit in prep for EOB ADLs  Pt will be Mod I grooming standing sink side LRAD  Pt will be Mod I LE dressing sitting EOB/long sit  Pt will be Mod I UB dressing sitting EOB/long sit  Pt will be Mod I sit <>  prep for toileting LRAD  Pt will be Mod I toileting/toilet transfer/cloth mgmt LRAD  Pt will be IND following UE HEP in prep for self care tasks    Outcome: Not Met

## 2022-01-21 NOTE — OP NOTES
Name of patient: Jahaira Garcia     MRN: [id number]    Date of surgery: 1/20/2022     Surgeon: Marlena Esparza MD    Assistant: Surgical scrub tech    Pre-operative diagnosis: Pathologic fracture of shaft of the right humerus    Post operative diagnosis: Same    Procedure:   1. Open reduction and intramedullary nailing of right humerus fracture  2. Bone biopsy of right humerus fracture    Anesthesia: General    Complications: none    Blood loss: 150 cc    Specimen: 2 sets of biopsy    IMPLANTS:   [place] intramedullary nail 8.5 mm x 225 mm  3 proximal and 2 distal locking screws    Indication of the procedure: See the detailed indication in consult note    Procedure details: Patient as well as right upper extremity was identified and marked in the holding area. Patient was then brought to the operating room and positioned supine onto the operating table. General anesthesia was induced by anesthesia provider. 2 g of Ancef was started go 30 minutes prior to incision. Now standard prepping and draping was done. time-out was completed and agreed by everyone in the room. Approximately 2 inch long incision was made on anterior aspect starting from tip of the acromion. Dissection was carried up to the deltoid muscle. Junction of anterior and lateral deltoid was split and then rotator cuff over humeral head was identified. A guidepin was introduced under fluoroscopic views from humeral head into the humeral canal.  Now rotator cuff around the guidepin was incised and then reaming was done. Now first the bone biopsy was obtained from intramedullary inserting long pituitary rongeur from proximal reaming up to the fracture site and tissues from around the fracture site was obtained and sent for pathology. Also posterior small incision was made at the fracture site under fluoroscopic guidance.   Pituitary rongeur was introduced under fluoroscopic views and another piece of biopsy was obtained which was also sent for pathologic exam.    Now ball-tipped guidewire was introduced from humeral head into the humeral canal across the fracture site. Reaming was done up to 9.5 mm. Now nail size was measured using the depth gauge and then humeral nail was introduced over the guidewire across the fracture site. Once reasonable position could be achieved, using the aiming arm, proximal locking's x3 was done. Now distally using the freehand technique using fluoroscopy perfect [place] technique, 2 distal locking screws were applied. All construct was checked under fluoroscopic views. Incisions were irrigated with plenty of saline. Rotator cuff was repaired with Vicryl. Deltoid was repaired and then subcutaneous closure was done with 2-0 Vicryl. Stapler was used for all incisions. Dry sterile dressing was applied. Patient was brought to recovery room in stable condition. I was scrubbed in and performed the entire procedure by myself.     Jaqueline Rodriguez MD

## 2022-01-22 VITALS
HEART RATE: 71 BPM | DIASTOLIC BLOOD PRESSURE: 53 MMHG | SYSTOLIC BLOOD PRESSURE: 114 MMHG | BODY MASS INDEX: 47.2 KG/M2 | RESPIRATION RATE: 16 BRPM | WEIGHT: 250 LBS | HEIGHT: 61 IN | OXYGEN SATURATION: 91 % | TEMPERATURE: 98.1 F

## 2022-01-22 PROBLEM — M84.40XA PATHOLOGICAL FRACTURE: Status: ACTIVE | Noted: 2022-01-22

## 2022-01-22 LAB
ALBUMIN SERPL-MCNC: 2.4 G/DL (ref 3.5–5)
ALBUMIN/GLOB SERPL: 0.7 {RATIO} (ref 1.1–2.2)
ALP SERPL-CCNC: 48 U/L (ref 45–117)
ALT SERPL-CCNC: 24 U/L (ref 12–78)
ANION GAP SERPL CALC-SCNC: 3 MMOL/L (ref 5–15)
AST SERPL W P-5'-P-CCNC: 26 U/L (ref 15–37)
BASOPHILS # BLD: 0 K/UL (ref 0–0.1)
BASOPHILS NFR BLD: 0 % (ref 0–1)
BILIRUB SERPL-MCNC: 0.4 MG/DL (ref 0.2–1)
BUN SERPL-MCNC: 24 MG/DL (ref 6–20)
BUN/CREAT SERPL: 27 (ref 12–20)
CA-I BLD-MCNC: 9.4 MG/DL (ref 8.5–10.1)
CHLORIDE SERPL-SCNC: 104 MMOL/L (ref 97–108)
CO2 SERPL-SCNC: 31 MMOL/L (ref 21–32)
CREAT SERPL-MCNC: 0.89 MG/DL (ref 0.55–1.02)
DIFFERENTIAL METHOD BLD: ABNORMAL
EOSINOPHIL # BLD: 0.1 K/UL (ref 0–0.4)
EOSINOPHIL NFR BLD: 1 % (ref 0–7)
ERYTHROCYTE [DISTWIDTH] IN BLOOD BY AUTOMATED COUNT: 13.9 % (ref 11.5–14.5)
FERRITIN SERPL-MCNC: 174 NG/ML (ref 8–252)
FOLATE SERPL-MCNC: 6.1 NG/ML (ref 5–21)
GLOBULIN SER CALC-MCNC: 3.4 G/DL (ref 2–4)
GLUCOSE SERPL-MCNC: 109 MG/DL (ref 65–100)
HCT VFR BLD AUTO: 28.7 % (ref 35–47)
HGB BLD-MCNC: 9.4 G/DL (ref 11.5–16)
IMM GRANULOCYTES # BLD AUTO: 0 K/UL (ref 0–0.04)
IMM GRANULOCYTES NFR BLD AUTO: 0 % (ref 0–0.5)
IRON SATN MFR SERPL: 21 % (ref 20–50)
IRON SERPL-MCNC: 47 UG/DL (ref 35–150)
LYMPHOCYTES # BLD: 0.8 K/UL (ref 0.8–3.5)
LYMPHOCYTES NFR BLD: 18 % (ref 12–49)
MCH RBC QN AUTO: 30.8 PG (ref 26–34)
MCHC RBC AUTO-ENTMCNC: 32.8 G/DL (ref 30–36.5)
MCV RBC AUTO: 94.1 FL (ref 80–99)
MONOCYTES # BLD: 0.5 K/UL (ref 0–1)
MONOCYTES NFR BLD: 12 % (ref 5–13)
NEUTS SEG # BLD: 3 K/UL (ref 1.8–8)
NEUTS SEG NFR BLD: 69 % (ref 32–75)
NRBC # BLD: 0 K/UL (ref 0–0.01)
NRBC BLD-RTO: 0 PER 100 WBC
PLATELET # BLD AUTO: 152 K/UL (ref 150–400)
PMV BLD AUTO: 10.2 FL (ref 8.9–12.9)
POTASSIUM SERPL-SCNC: 3.8 MMOL/L (ref 3.5–5.1)
PROT SERPL-MCNC: 5.8 G/DL (ref 6.4–8.2)
RBC # BLD AUTO: 3.05 M/UL (ref 3.8–5.2)
SODIUM SERPL-SCNC: 138 MMOL/L (ref 136–145)
TIBC SERPL-MCNC: 221 UG/DL (ref 250–450)
VIT B12 SERPL-MCNC: 201 PG/ML (ref 193–986)
WBC # BLD AUTO: 4.4 K/UL (ref 3.6–11)

## 2022-01-22 PROCEDURE — 83540 ASSAY OF IRON: CPT

## 2022-01-22 PROCEDURE — G0378 HOSPITAL OBSERVATION PER HR: HCPCS

## 2022-01-22 PROCEDURE — 97530 THERAPEUTIC ACTIVITIES: CPT

## 2022-01-22 PROCEDURE — 82607 VITAMIN B-12: CPT

## 2022-01-22 PROCEDURE — 74011250637 HC RX REV CODE- 250/637: Performed by: INTERNAL MEDICINE

## 2022-01-22 PROCEDURE — 85025 COMPLETE CBC W/AUTO DIFF WBC: CPT

## 2022-01-22 PROCEDURE — 80053 COMPREHEN METABOLIC PANEL: CPT

## 2022-01-22 PROCEDURE — 36415 COLL VENOUS BLD VENIPUNCTURE: CPT

## 2022-01-22 PROCEDURE — 74011250637 HC RX REV CODE- 250/637: Performed by: PHYSICIAN ASSISTANT

## 2022-01-22 PROCEDURE — 74011000250 HC RX REV CODE- 250: Performed by: INTERNAL MEDICINE

## 2022-01-22 PROCEDURE — 82728 ASSAY OF FERRITIN: CPT

## 2022-01-22 RX ORDER — CELECOXIB 200 MG/1
200 CAPSULE ORAL 2 TIMES DAILY
Qty: 28 CAPSULE | Refills: 0 | Status: SHIPPED | OUTPATIENT
Start: 2022-01-22 | End: 2022-02-05

## 2022-01-22 RX ORDER — OXYCODONE HYDROCHLORIDE 5 MG/1
5 TABLET ORAL
Qty: 12 TABLET | Refills: 0 | Status: SHIPPED | OUTPATIENT
Start: 2022-01-22 | End: 2022-01-25

## 2022-01-22 RX ORDER — ONDANSETRON 4 MG/1
4 TABLET, FILM COATED ORAL
Qty: 30 TABLET | Refills: 1 | Status: SHIPPED | OUTPATIENT
Start: 2022-01-22 | End: 2022-04-03 | Stop reason: DRUGHIGH

## 2022-01-22 RX ADMIN — ASPIRIN 325 MG ORAL TABLET 325 MG: 325 PILL ORAL at 09:38

## 2022-01-22 RX ADMIN — SODIUM CHLORIDE, PRESERVATIVE FREE 10 ML: 5 INJECTION INTRAVENOUS at 05:15

## 2022-01-22 RX ADMIN — ATORVASTATIN CALCIUM 20 MG: 20 TABLET, FILM COATED ORAL at 09:38

## 2022-01-22 RX ADMIN — PANTOPRAZOLE SODIUM 20 MG: 20 TABLET, DELAYED RELEASE ORAL at 09:39

## 2022-01-22 RX ADMIN — ONDANSETRON 4 MG: 4 TABLET, ORALLY DISINTEGRATING ORAL at 09:45

## 2022-01-22 RX ADMIN — CELECOXIB 200 MG: 200 CAPSULE ORAL at 09:38

## 2022-01-22 RX ADMIN — POTASSIUM CHLORIDE 20 MEQ: 1500 TABLET, EXTENDED RELEASE ORAL at 09:39

## 2022-01-22 RX ADMIN — LISINOPRIL 10 MG: 10 TABLET ORAL at 09:39

## 2022-01-22 RX ADMIN — ACETAMINOPHEN 1000 MG: 500 TABLET, FILM COATED ORAL at 12:25

## 2022-01-22 NOTE — PROGRESS NOTES
CM spoke with patient's son Denise Fam ([phone]) to get choice for home health. He gave choice for [address] [place] [address] and [facility]. He requested that a hospital bed and bedside commode be ordered for patient. Choice letter updated. Referrals sent via [employer]. CM unable to reach [employer] companies to follow up, and it is highly likely they will not be able to deliver equipment before Monday. Son notified of this update. [facility] accepted patient, [facility] 01/24/2022.

## 2022-01-22 NOTE — PROGRESS NOTES
Was given in report patient stephens catheter was removed at 3p.m. Patient had no urine output since the removal of the stephens. Patient was bladder scanned at 2300. 237ml was in bladder. Contacted doctor Marni Butler and was given orders to continue to monitor and if their is greater than 500ml on the next bladder scan to straight cath patient.

## 2022-01-22 NOTE — PROGRESS NOTES
Problem: Mobility Impaired (Adult and Pediatric)  Goal: *Acute Goals and Plan of Care (Insert Text)  Description: Patient will move from supine to sit and sit to supine , scoot up and down, and roll side to side in bed with moderate assistance  within 7 day(s). Patient will transfer from bed to chair and chair to bed with minimal assistance/contact guard assist using the least restrictive device within 7 day(s). Patient will improve static standing balance to supervision within 1 week(s). Patient will ambulate 25 feet with minimal assistance with least restrictive device within 1 weeks. 1/22/2022 1019 by Jose David Adkins  Outcome: Progressing Towards Goal   PHYSICAL THERAPY TREATMENT  Patient: Sari Quiros (4 y.o. female)  Date: 1/22/2022  Diagnosis: Right humeral fracture [S42.301A] <principal problem not specified>  Procedure(s) (LRB):  OPEN REDUCTION INTERNAL FIXATION Right Humerus With Bone Biopsy (Right) 2 Days Post-Op  Precautions:    Chart, physical therapy assessment, plan of care and goals were reviewed. ASSESSMENT  Patient continues with skilled PT services and is progressing towards goals. Co-treat with OT due to level of assist needed for OOB mobility for safety of patient/clinician. Patient with improved mobiltiy supine>sit, only requiring CGA with additional time today. Compliant to NWB RUE status with mobility. CGA to scoot to EOB to stand. Clint x 2 sit<>stand with cues for hand placement to push off bed. Slight posterior lean in stand and modA x 2 to maintain standing balance and for gait training 15 feet to recliner. Decr LE motor control noted during gait with narrow LONNIE and swaying of trunk. L HHA for gait training and cues to widen LONNIE. Patient may benefit from use of quad cane for gait training for incr support.  Patient positioned in recliner and then performed LE and UE strengthening exercises before setting her up to eat breakfast. She will benefit from continued skilled PT Services to improve her strength, standing balance and endurance to decrease assistance from caregivers. Current Level of Function Impacting Discharge (mobility/balance): decr mobility, decr standing balance, decr endurance     Other factors to consider for discharge: NWB RUE, humeral fx, medical hx         PLAN :  Patient continues to benefit from skilled intervention to address the above impairments. Continue treatment per established plan of care. to address goals. Recommendation for discharge: (in order for the patient to meet his/her long term goals)  [address] [facility]     This discharge recommendation:  Has been made in collaboration with the attending provider and/or case management    IF patient discharges home will need the following DME: quad cane and to be determined (TBD)       SUBJECTIVE:   Patient stated I have had a cough for a while now.     OBJECTIVE DATA SUMMARY:   Critical Behavior:  Neurologic State: Alert  Orientation Level: Oriented X4  Cognition: Follows commands     Functional Mobility Training:  Bed Mobility:  Rolling: Contact guard assistance  Supine to Sit: Contact guard assistance; Additional time     Scooting: Contact guard assistance        Transfers:  Sit to Stand: Minimum assistance;Assist x2  Stand to Sit: Minimum assistance;Assist x2        Bed to Chair: Moderate assistance;Assist x2        Balance:  Sitting: Impaired; With support  Sitting - Static: Fair (occasional)  Sitting - Dynamic: Fair (occasional)  Standing: Impaired; With support  Standing - Static: Constant support; Fair  Standing - Dynamic : Constant support; Fair  Ambulation/Gait Training:  Distance (ft): 15 Feet (ft)  Assistive Device: Gait belt (L HHA)  Ambulation - Level of Assistance:  Moderate assistance;Assist x2  NBOS and decr motor control of LEs     Therapeutic Exercises:   2 x 10 in sitting: marching, LAQ, heel raises, toe raises, L shoulder abd/add, L shoulder flexion, LUE bicep curls - verbal and visual cues given for proper performance   Pain Rating:  No pain     Activity Tolerance:   Fair and requires rest breaks  Please refer to the flowsheet for vital signs taken during this treatment. After treatment patient left in no apparent distress:   Sitting in chair and Call bell within reach    COMMUNICATION/COLLABORATION:   The patients plan of care was discussed with: Physical therapist, Occupational therapy assistant, and Registered nurse.      Kym Hall   Time Calculation: 24 mins

## 2022-01-22 NOTE — PROGRESS NOTES
Patient was straight Cath and only 150 was obtained from bladder, rebladder scanned patient and monitor showed 192 remaining in bladder. Will pass on to day shift nurse to rebladder scan patient at 0800.

## 2022-01-22 NOTE — DISCHARGE SUMMARY
Admit date: 1/19/2022   Admitting Provider: Teresa Boucher MD    Discharge date: 1/22/2022  Discharging Provider: Sammie Tsang PA-C      * Admission Diagnoses: Right humeral fracture [S42.301A]    * Discharge Diagnoses:    Hospital Problems as of 1/22/2022 Date Reviewed: 11/11/2019          Codes Class Noted - Resolved POA    Pathological fracture ICD-10-CM: M84.40XA  ICD-9-CM: 733.10  1/22/2022 - Present Unknown        Right humeral fracture ICD-10-CM: S42.301A  ICD-9-CM: 812.20  1/19/2022 - Present Unknown              * Hospital Course:   77 yo female who presented to the ED after acute onset of R arm pain after pulling herself out of the bathtub and hearing a pop with pain. X-ray yielded probable pathologic fracture, minimally displaced fracture involving distal third of right humerus. Patient taken to the operating room for open reduction and intramedullary nailing of the right humerus fracture. Bone biopsy was performed and pathology is pending. Oncology consultation, Dr. Jamie Shahid. Nuclear medicine bone scan performed revealing nonspecific foci increased activity in the posterior lateral left eighth rib possibly a remote injury as well as faint focus in the right humerus from known fracture. CT scan of the chest abdomen pelvis recommended for further staging. This may be performed as an outpatient. Patient to be discharged home with home health services. Patient will be discharged on oxycodone for pain control. Patient to follow-up with oncology for pathology results and possible malignant work-up.     * Procedures:   Procedure(s):  OPEN REDUCTION INTERNAL FIXATION Right Humerus With Bone Biopsy      Consults:   Orthopedics    Significant Diagnostic Studies: As discussed in hospital course    Discharge Exam:  Visit Vitals  BP (!) 114/53   Pulse 71   Temp 98.1 °F (36.7 °C)   Resp 16   Ht 5' 1.02\" (1.55 m)   Wt 113.4 kg (250 lb)   SpO2 91%   BMI 47.20 kg/m²     PHYSICAL EXAM:  Constitutional: Alert in no acute distress   HEENT: Sclerae anicteric, The neck is supple. Cardiovascular: Regular rate and rhythm. No murmurs, gallops, or rubs. Misty Gallery Respiratory: Clear breath sounds with no wheezes, rales, or rhonchi. GI: Abdomen nondistended, soft, and nontender. Normal active bowel sounds. Rectal: Deferred   Musculoskeletal: Right arm in a sling with no obvious deformity. Surgical scars are without dehiscence. Right hand warm to the touch with motor and sensation present   neurological:  Patient is alert and oriented. Cranial nerves II-XII intact  Psychiatric: Mood appears appropriate with judgement intact. Lymphatic: No cervical or supraclavicular adenopathy. Skin: No rashes or breakdown of the skin      * Discharge Condition: stable  * Disposition: Rehab    Discharge Medications:  Current Discharge Medication List      START taking these medications    Details   linaCLOtide (Linzess) 72 mcg cap capsule Take 1 Capsule by mouth Daily (before breakfast). Qty: 30 Capsule, Refills: 1  Start date: 1/22/2022      celecoxib (CELEBREX) 200 mg capsule Take 1 Capsule by mouth two (2) times a day for 14 days. Qty: 28 Capsule, Refills: 0  Start date: 1/22/2022, End date: 2/5/2022      oxyCODONE IR (ROXICODONE) 5 mg immediate release tablet Take 1 Tablet by mouth every four (4) hours as needed for Pain for up to 3 days. Max Daily Amount: 30 mg.  Qty: 12 Tablet, Refills: 0  Start date: 1/22/2022, End date: 1/25/2022    Associated Diagnoses: Closed displaced fracture of medial epicondyle of right humerus, unspecified fracture morphology, initial encounter      ondansetron hcl (Zofran) 4 mg tablet Take 1 Tablet by mouth every eight (8) hours as needed for Nausea or Vomiting. Qty: 30 Tablet, Refills: 1  Start date: 1/22/2022         CONTINUE these medications which have NOT CHANGED    Details   diclofenac EC (VOLTAREN) 75 mg EC tablet Take 1 Tab by mouth two (2) times a day.   Qty: 20 Tab, Refills: 0 ondansetron (ZOFRAN ODT) 4 mg disintegrating tablet Take 1 Tab by mouth every eight (8) hours as needed for Nausea. Qty: 20 Tab, Refills: 0      multivitamin (ONE A DAY) tablet Take 1 tablet by mouth daily. omeprazole (PRILOSEC) 20 mg capsule TAKE ONE CAPSULE BY MOUTH DAILY  Qty: 30 Cap, Refills: 0    Comments: Needs appointment      KLOR-CON M20 20 mEq tablet TAKE ONE TABLET BY MOUTH EVERY DAY  Qty: 30 Tab, Refills: 4      rosuvastatin (CRESTOR) 10 mg tablet Take 1 Tab by mouth daily. Qty: 30 Tab, Refills: 4    Associated Diagnoses: Hypercholesteremia      lisinopril (PRINIVIL, ZESTRIL) 10 mg tablet Take 1 Tab by mouth daily. Qty: 30 Tab, Refills: 1    Associated Diagnoses: HTN (hypertension)      polyethylene glycol (MIRALAX) 17 gram/dose powder Take 17 g by mouth two (2) times a day. Qty: 510 g, Refills: 3    Associated Diagnoses: Diverticulosis      acetaminophen (TYLENOL EXTRA STRENGTH) 500 mg tablet Take  by mouth every six (6) hours as needed. butalbital-acetaminophen-caffeine (FIORICET, ESGIC) -40 mg per tablet Take 1 Tab by mouth every six (6) hours as needed for Pain. Qty: 30 Tab, Refills: 0    Associated Diagnoses: HA (headache)      aspirin (ASPIRIN) 325 mg tablet Take 325 mg by mouth daily. * Follow-up Care/Patient Instructions: Activity: Activity as tolerated  Diet: Cardiac diet  Wound Care: Keep bandage clean    Follow-up Information     Follow up With Specialties Details Why Contact Info    Devonna Bosworth., MD Internal Medicine In 1 week  [address] [facility]  [phone]      Abhijit Manjarrez MD Orthopedic Surgery In 2 weeks  [address]  [phone]      Risa Rodríguez MD Hematology and Oncology In 1 week  [address] [facility]            Discharge summary greater than 35 minutes spent with the patient performing discharge instructions, medication review and physical exam    Signed:  Robel Corado PA-C  1/22/2022  11:16 AM

## 2022-01-22 NOTE — PROGRESS NOTES
OCCUPATIONAL THERAPY TREATMENT  Patient: Dorla Kawasaki (51 y.o. female)  Date: 1/22/2022  Diagnosis: Right humeral fracture [S42.301A] <principal problem not specified>  Procedure(s) (LRB):  OPEN REDUCTION INTERNAL FIXATION Right Humerus With Bone Biopsy (Right) 2 Days Post-Op  Precautions:    Chart, occupational therapy assessment, plan of care, and goals were reviewed. ASSESSMENT  Patient continues with skilled OT services and is progressing towards goals. Pt. Received semi-supine in bed and agreeable to tx session. Pt. Required total A for re-adjusting RUE brace. Pt. Performed bed mobility with additional time and CGA, scooting with CGA, sit-> stand Min A x2, pt required increased time while standing up-right at EOB with LUE HHA d/t increased dizziness and need for assistance for stability and balance upon standing. Pt. Performed functional ambulation around perimeter of bed with Mod A x2. Pt. Demonstrated increased need for assistance during ambulation d/t pt very unsteady and narrow base of support noted. Pt required left HHA during functional ambulation - pt may benefit from quad cane next tx session. Pt. Performed chair transfer with Min A x2 for lowering self into chair. Pt. Performed UE and LE therex while seated in chair. AROM on LUE( shoulder flex/ex, horizontal abd/add, and elbow flex/ex) and AROM flex/ex in wrist and flex/ex on digits on RUE. Pt required set-up assistance with breakfast. Pt. Able to adhere to NWB status on RUE. Pt. Left with needs met and call bell within reach. REC. IRF when medically appropriate for discharge. Current Level of Function Impacting Discharge (ADLs): assistance with all ADL's d/t NWB and decreased ROM in RUE to assist with ADLs and functional transfers and mobility. Other factors to consider for discharge: PLOF, time since on set         PLAN :  Patient continues to benefit from skilled intervention to address the above impairments.   Continue treatment per established plan of care. to address goals. Recommendation for discharge: (in order for the patient to meet his/her long term goals)  Therapy 3 hours per day 5-7 days per week    This discharge recommendation:  Has been made in collaboration with the attending provider and/or case management    IF patient discharges home will need the following DME: TBD       SUBJECTIVE:   Patient agreeable to tx session    OBJECTIVE DATA SUMMARY:   Cognitive/Behavioral Status:  Neurologic State: Alert  Orientation Level: Oriented X4  Cognition: Follows commands    Functional Mobility and Transfers for ADLs:  Bed Mobility:  Rolling: Contact guard assistance  Supine to Sit: Contact guard assistance; Additional time  Scooting: Contact guard assistance    Transfers:  Sit to Stand: Minimum assistance;Assist x2     Bed to Chair: Moderate assistance;Assist x2    Balance:  Sitting: Impaired; With support  Sitting - Static: Fair (occasional)  Sitting - Dynamic: Fair (occasional)  Standing: Impaired; With support  Standing - Static: Constant support; Fair  Standing - Dynamic : Constant support; Fair    ADL Intervention:  Feeding  Container Management: Set-up    Therapeutic Exercises: melvin UE's  Exercise Sets Reps AROM AAROM PROM Self PROM Comments   Shoulder flex/ext 1 10 [x] [] [] [] LUE only   Elbow flex/ext 1 10 [x] [] [] [] LUE only   Wrist flex/ext 1 10 [x] [] [] []    Digit flex/ext 1 10 [x] [] [] []          Pain:  0/ 10 pain reported    Activity Tolerance:   Fair  Please refer to the flowsheet for vital signs taken during this treatment. After treatment patient left in no apparent distress:   Sitting in chair and Call bell within reach    COMMUNICATION/COLLABORATION:   The patients plan of care was discussed with: Physical therapy assistant.  Co-tx with PTA for increased assistance with OOB mobility and functional ambulation      Roxanne Lewis  Time Calculation: 25 mins    Problem: Self Care Deficits Care Plan (Adult)  Goal: *Acute Goals and Plan of Care (Insert Text)  Description: Pt will be Mod I sup <> sit in prep for EOB ADLs  Pt will be Mod I grooming standing sink side LRAD  Pt will be Mod I LE dressing sitting EOB/long sit  Pt will be Mod I UB dressing sitting EOB/long sit  Pt will be Mod I sit <>  prep for toileting LRAD  Pt will be Mod I toileting/toilet transfer/cloth mgmt LRAD  Pt will be IND following UE HEP in prep for self care tasks    Outcome: Progressing Towards Goal

## 2022-01-22 NOTE — PROGRESS NOTES
Fabiola Sevilla  [id number]  1/22/2022    Subjective:  - pain 6/10  -No complaints of nausea or vomiting  -No complaints of chest pain  -No complaints of tingling or numbness  Was gone for nuclear bone scan. Objective:  -Sling in situ   -Dressing clean dry and intact  -No abnormal swelling  -fingers/Toes movement full  -Radial pulse 2+  -Sensation intact   -SCDs in situ    Vitals:  Patient Vitals for the past 24 hrs:   Temp Pulse Resp BP SpO2   01/22/22 0824 98.1 °F (36.7 °C) 71 16 (!) 114/53 91 %   01/21/22 2130     98 %   01/21/22 2020 98.8 °F (37.1 °C) 84 16 (!) 143/76 98 %   01/21/22 1408     93 %   01/21/22 1044 97.7 °F (36.5 °C) 72 16 134/64 95 %        Labs:  Recent Results (from the past 24 hour(s))   METABOLIC PANEL, COMPREHENSIVE    Collection Time: 01/22/22  7:08 AM   Result Value Ref Range    Sodium 138 136 - 145 mmol/L    Potassium 3.8 3.5 - 5.1 mmol/L    Chloride 104 97 - 108 mmol/L    CO2 31 21 - 32 mmol/L    Anion gap 3 (L) 5 - 15 mmol/L    Glucose 109 (H) 65 - 100 mg/dL    BUN 24 (H) 6 - 20 mg/dL    Creatinine 0.89 0.55 - 1.02 mg/dL    BUN/Creatinine ratio 27 (H) 12 - 20      GFR est AA >60 >60 ml/min/1.73m2    GFR est non-AA >60 >60 ml/min/1.73m2    Calcium 9.4 8.5 - 10.1 mg/dL    Bilirubin, total 0.4 0.2 - 1.0 mg/dL    AST (SGOT) 26 15 - 37 U/L    ALT (SGPT) 24 12 - 78 U/L    Alk. phosphatase 48 45 - 117 U/L    Protein, total 5.8 (L) 6.4 - 8.2 g/dL    Albumin 2.4 (L) 3.5 - 5.0 g/dL    Globulin 3.4 2.0 - 4.0 g/dL    A-G Ratio 0.7 (L) 1.1 - 2.2        Nuclear bone scan (1/21/22)  Renal and bladder activity are evident. Ortega catheter in place.     Mild increased activity noted involving feet/ankles, knees, shoulders, and  posterior elements of the entire spine. Findings appear typical for degenerative  change.     There are 2 in series nonspecific foci increased activity noted involving left  posterolateral 8th rib.  Suspect remote injury.     Faint focus increased activity noted involving distal 1/3 right humerus  correlative with known fracture as seen on comparison imaging.     IMPRESSION  No pattern typical for osseous metastatic disease.     Nonspecific in series foci increased activity posterolateral left 8th rib  probably related to remote injury. Faint focus increased activity right humerus correlative with known fracture    Assessment:  POD #2 intramedullary nailing for right humerus fracture    Plan/recommendations:  -Pain medication, IV and oral  -Physical therapy for elbow and fingers range of motion exercises  -Pathology report pending  -Bone scan report as above. There is no other obvious foci seen for malignancy in the bone.   No other obvious lesion in the right humerus bone other than at fracture site  -Seen by oncologist.  The recommended the patient to be seen outpatient  -CT scan of chest abdomen and pelvis has been ordered, not done yet

## 2022-01-23 LAB
BACTERIA SPEC CULT: ABNORMAL
COLONY COUNT,CNT: ABNORMAL
SPECIAL REQUESTS,SREQ: ABNORMAL

## 2022-02-15 ENCOUNTER — HOSPITAL ENCOUNTER (OUTPATIENT)
Dept: RADIATION THERAPY | Age: 77
Discharge: HOME | End: 2022-02-15

## 2022-02-18 ENCOUNTER — HOSPITAL ENCOUNTER (OUTPATIENT)
Dept: RADIATION THERAPY | Age: 77
Discharge: HOME | End: 2022-02-18

## 2022-02-22 ENCOUNTER — HOSPITAL ENCOUNTER (OUTPATIENT)
Dept: RADIATION THERAPY | Age: 77
Discharge: HOME | End: 2022-02-22
Payer: MEDICARE

## 2022-02-22 PROCEDURE — 77412 RADIATION TX DELIVERY LVL 3: CPT

## 2022-02-22 PROCEDURE — 77334 RADIATION TREATMENT AID(S): CPT

## 2022-02-22 PROCEDURE — 77417 THER RADIOLOGY PORT IMAGE(S): CPT

## 2022-02-22 PROCEDURE — 77290 THER RAD SIMULAJ FIELD CPLX: CPT

## 2022-02-23 ENCOUNTER — HOSPITAL ENCOUNTER (OUTPATIENT)
Dept: RADIATION THERAPY | Age: 77
Discharge: HOME | End: 2022-02-23
Payer: MEDICARE

## 2022-02-23 PROCEDURE — 77412 RADIATION TX DELIVERY LVL 3: CPT

## 2022-02-24 ENCOUNTER — HOSPITAL ENCOUNTER (OUTPATIENT)
Dept: RADIATION THERAPY | Age: 77
Discharge: HOME | End: 2022-02-24
Payer: MEDICARE

## 2022-02-24 PROCEDURE — 77412 RADIATION TX DELIVERY LVL 3: CPT

## 2022-02-25 ENCOUNTER — HOSPITAL ENCOUNTER (OUTPATIENT)
Dept: RADIATION THERAPY | Age: 77
Discharge: HOME | End: 2022-02-25
Payer: MEDICARE

## 2022-02-25 PROCEDURE — 77412 RADIATION TX DELIVERY LVL 3: CPT

## 2022-02-28 ENCOUNTER — HOSPITAL ENCOUNTER (OUTPATIENT)
Dept: RADIATION THERAPY | Age: 77
Discharge: HOME | End: 2022-02-28
Payer: MEDICARE

## 2022-02-28 PROCEDURE — 77336 RADIATION PHYSICS CONSULT: CPT

## 2022-02-28 PROCEDURE — 77412 RADIATION TX DELIVERY LVL 3: CPT

## 2022-02-28 PROCEDURE — 77417 THER RADIOLOGY PORT IMAGE(S): CPT

## 2022-03-01 ENCOUNTER — HOSPITAL ENCOUNTER (OUTPATIENT)
Dept: RADIATION THERAPY | Age: 77
Discharge: HOME | End: 2022-03-01
Payer: MEDICARE

## 2022-03-01 PROCEDURE — 77412 RADIATION TX DELIVERY LVL 3: CPT

## 2022-03-02 ENCOUNTER — HOSPITAL ENCOUNTER (OUTPATIENT)
Dept: RADIATION THERAPY | Age: 77
Discharge: HOME | End: 2022-03-02
Payer: MEDICARE

## 2022-03-02 PROCEDURE — 77412 RADIATION TX DELIVERY LVL 3: CPT

## 2022-03-03 ENCOUNTER — HOSPITAL ENCOUNTER (OUTPATIENT)
Dept: RADIATION THERAPY | Age: 77
Discharge: HOME | End: 2022-03-03
Payer: MEDICARE

## 2022-03-03 PROCEDURE — 77412 RADIATION TX DELIVERY LVL 3: CPT

## 2022-03-04 ENCOUNTER — HOSPITAL ENCOUNTER (OUTPATIENT)
Dept: RADIATION THERAPY | Age: 77
Discharge: HOME | End: 2022-03-04
Payer: MEDICARE

## 2022-03-04 ENCOUNTER — APPOINTMENT (OUTPATIENT)
Dept: GENERAL RADIOLOGY | Age: 77
End: 2022-03-04
Attending: EMERGENCY MEDICINE
Payer: MEDICARE

## 2022-03-04 ENCOUNTER — HOSPITAL ENCOUNTER (EMERGENCY)
Age: 77
Discharge: HOME | End: 2022-03-05
Attending: FAMILY MEDICINE
Payer: MEDICARE

## 2022-03-04 DIAGNOSIS — S59.909A ELBOW INJURY, UNSPECIFIED LATERALITY, INITIAL ENCOUNTER: Primary | ICD-10-CM

## 2022-03-04 PROCEDURE — 96375 TX/PRO/DX INJ NEW DRUG ADDON: CPT

## 2022-03-04 PROCEDURE — 73090 X-RAY EXAM OF FOREARM: CPT

## 2022-03-04 PROCEDURE — 74011250636 HC RX REV CODE- 250/636: Performed by: FAMILY MEDICINE

## 2022-03-04 PROCEDURE — 77412 RADIATION TX DELIVERY LVL 3: CPT

## 2022-03-04 PROCEDURE — 73080 X-RAY EXAM OF ELBOW: CPT

## 2022-03-04 PROCEDURE — 96374 THER/PROPH/DIAG INJ IV PUSH: CPT

## 2022-03-04 PROCEDURE — 73030 X-RAY EXAM OF SHOULDER: CPT

## 2022-03-04 PROCEDURE — 99284 EMERGENCY DEPT VISIT MOD MDM: CPT

## 2022-03-04 RX ORDER — ONDANSETRON 2 MG/ML
4 INJECTION INTRAMUSCULAR; INTRAVENOUS
Status: COMPLETED | OUTPATIENT
Start: 2022-03-04 | End: 2022-03-04

## 2022-03-04 RX ORDER — FENTANYL CITRATE 50 UG/ML
100 INJECTION, SOLUTION INTRAMUSCULAR; INTRAVENOUS
Status: COMPLETED | OUTPATIENT
Start: 2022-03-04 | End: 2022-03-04

## 2022-03-04 RX ADMIN — ONDANSETRON 4 MG: 2 INJECTION INTRAMUSCULAR; INTRAVENOUS at 23:36

## 2022-03-04 RX ADMIN — FENTANYL CITRATE 100 MCG: 50 INJECTION INTRAMUSCULAR; INTRAVENOUS at 22:44

## 2022-03-05 ENCOUNTER — APPOINTMENT (OUTPATIENT)
Dept: CT IMAGING | Age: 77
End: 2022-03-05
Attending: FAMILY MEDICINE
Payer: MEDICARE

## 2022-03-05 VITALS
BODY MASS INDEX: 39.46 KG/M2 | TEMPERATURE: 98 F | RESPIRATION RATE: 17 BRPM | HEIGHT: 61 IN | OXYGEN SATURATION: 95 % | HEART RATE: 63 BPM | WEIGHT: 209 LBS | DIASTOLIC BLOOD PRESSURE: 61 MMHG | SYSTOLIC BLOOD PRESSURE: 134 MMHG

## 2022-03-05 PROCEDURE — 96376 TX/PRO/DX INJ SAME DRUG ADON: CPT

## 2022-03-05 PROCEDURE — 73200 CT UPPER EXTREMITY W/O DYE: CPT

## 2022-03-05 PROCEDURE — 74011250637 HC RX REV CODE- 250/637: Performed by: FAMILY MEDICINE

## 2022-03-05 PROCEDURE — 74011250636 HC RX REV CODE- 250/636: Performed by: FAMILY MEDICINE

## 2022-03-05 RX ORDER — FENTANYL CITRATE 50 UG/ML
100 INJECTION, SOLUTION INTRAMUSCULAR; INTRAVENOUS
Status: COMPLETED | OUTPATIENT
Start: 2022-03-05 | End: 2022-03-05

## 2022-03-05 RX ORDER — ACETAMINOPHEN 325 MG/1
650 TABLET ORAL
Status: COMPLETED | OUTPATIENT
Start: 2022-03-05 | End: 2022-03-05

## 2022-03-05 RX ADMIN — ACETAMINOPHEN 650 MG: 325 TABLET ORAL at 00:27

## 2022-03-05 RX ADMIN — FENTANYL CITRATE 100 MCG: 50 INJECTION, SOLUTION INTRAMUSCULAR; INTRAVENOUS at 01:15

## 2022-03-05 NOTE — DISCHARGE INSTRUCTIONS
Thank you! Thank you for allowing me to care for you in the emergency department. I sincerely hope that you are satisfied with your visit today. It is my goal to provide you with excellent care. Below you will find a list of your labs and imaging from your visit today. Should you have any questions regarding these results please do not hesitate to call the emergency department. Labs -   No results found for this or any previous visit (from the past 12 hour(s)). Radiologic Studies -   CT UP EXT LT WO CONT   Final Result   No acute abnormality demonstrated          XR ELBOW LT MIN 3 V   Final Result   No evident recent bone lesion, though an apparent effusion suggests   the possibility of an occult fracture. XR SHOULDER LT AP/LAT MIN 2 V   Final Result   The humerus is internally rotated in all views. Thus, cannot exclude   the possibility of a posterior glenohumeral dislocation. If there is clinical   concern for such a phenomenon, axillary view or true AP Ingrid Spatz) view   recommended. If these views are not possible, CT recommended. XR FOREARM LT AP/LAT   Final Result   No evident recent bone lesion. Probable elbow effusion. Please see   report of elbow exam today. CT Results  (Last 48 hours)                 03/05/22 0145  CT UP EXT LT WO CONT Final result    Impression:  No acute abnormality demonstrated           Narrative:  PROCEDURE: CT UP EXT LT WO CONT       HISTORY:Elbow and shoulder pain       COMPARISON:Radiographs of the region done for March 2022       Department policy stipulates all CT scans at this facility are performed using   dose reduction optimization techniques as appropriate to the performed exam,   including the following: Automated exposure control, adjustments of the mA   and/or KVP according to the patient size, and the use of iterative   reconstruction technique.        Axial images were obtained through the shoulder, humerus and elbow with   multiplanar reconstructions. No IV contrast.       Bones show no fracture or destructive lesion. The glenohumeral joint appears normally articulated. No excess fluid in the   joint. The elbow joint appears normally articulated. No excess fluid in the joint. No focal soft tissue abnormalities. No discrete abnormal fluid collections appreciated. CXR Results  (Last 48 hours)      None               If you feel that you have not received excellent quality care or timely care, please ask to speak to the nurse manager. Please choose us in the future for your continued health care needs. ------------------------------------------------------------------------------------------------------------  The exam and treatment you received in the Emergency Department were for an urgent problem and are not intended as complete care. It is important that you follow-up with a doctor, nurse practitioner, or physician assistant to:  (1) confirm your diagnosis,  (2) re-evaluation of changes in your illness and treatment, and  (3) for ongoing care. If your symptoms become worse or you do not improve as expected and you are unable to reach your usual health care provider, you should return to the Emergency Department. We are available 24 hours a day. Please take your discharge instructions with you when you go to your follow-up appointment. If you have any problem arranging a follow-up appointment, contact the Emergency Department immediately. If a prescription has been provided, please have it filled as soon as possible to prevent a delay in treatment. Read the entire medication instruction sheet provided to you by the pharmacy. If you have any questions or reservations about taking the medication due to side effects or interactions with other medications, please call your primary care physician or contact the ER to speak with the charge nurse.      Make an appointment with your family doctor or the physician you were referred to for follow-up of this visit as instructed on your discharge paperwork, as this is a mandatory follow-up. Return to the ER if you are unable to be seen or if you are unable to be seen in a timely manner. If you have any problem arranging the follow-up visit, contact the Emergency Department immediately.

## 2022-03-05 NOTE — ED TRIAGE NOTES
Hx bone cancer. Had radiation on right arm today. Bending to reach for something with left arm and heard a pop. Arm deformed. 18 guage right ac. 100 mcg fentanyl and 4mg zofran given.

## 2022-03-05 NOTE — ED PROVIDER NOTES
HPI   78 yr old with pmh of \"bone cancer\" who is here c/o L elbow injury. Pt was reaching in a basket just PTA, felt a pop, and had immediate pain in L elbow. Pain is constant, nonradiating, worse with movement/palpation, improved with fentanyl 100 mcg en route, currently 0/10 in severity. Had radiation on R arm today. Has unknown prn pain med at home which she reportedly rarely takes. Past Medical History:   Diagnosis Date    Cancer [facility])     had breast cancer     Colon polyps 10/1/2010    Diverticulosis 10/1/2010    GERD (gastroesophageal reflux disease)     Hypertension     Unspecified sleep apnea        Past Surgical History:   Procedure Laterality Date    COLONOSCOPY N/A 2019    COLONOSCOPY performed by [facility]isha [facility] at [facility] ENDOSCOPY    Uzma Fernandez  2015         Uzma Fernandez  2019         ENDOSCOPY, COLON, DIAGNOSTIC      polyps removed    HX BREAST LUMPECTOMY      left breast - chemo,surgery, radiation (partial mastectomy)    HX CHOLECYSTECTOMY      HX HYSTERECTOMY      TAHBSO 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     Diabetes Brother     Hypertension Father     Cancer Other        Social History     Socioeconomic History    Marital status:      Spouse name: Not on file    Number of children: Not on file    Years of education: Not on file    Highest education level: Not on file   Occupational History    Not on file   Tobacco Use    Smoking status: Former Smoker     Packs/day: 0.00     Years: 20.00     Pack years: 0.00     Quit date: 10/1/2000     Years since quittin.4    Smokeless tobacco: Never Used   Substance and Sexual Activity    Alcohol use: No    Drug use: No    Sexual activity: Not on file   Other Topics Concern    Not on file   Social History Narrative    Not on file     Social Determinants of Health     Financial Resource Strain:     Difficulty of Paying Living Expenses: Not on file   Food Insecurity:     Worried About [address] in the Last Year: Not on file    Yi of Food in the Last Year: Not on file   Transportation Needs:     Lack of Transportation (Medical): Not on file    Lack of Transportation (Non-Medical): Not on file   Physical Activity:     Days of Exercise per Week: Not on file    Minutes of Exercise per Session: Not on file   Stress:     Feeling of Stress : Not on file   Social Connections:     Frequency of Communication with Friends and Family: Not on file    Frequency of Social Gatherings with Friends and Family: Not on file    Attends Restorationist Services: Not on file    Active Member of [address] [place] [address] or Organizations: Not on file    Attends Club or Organization Meetings: Not on file    Marital Status: Not on file   Intimate Partner Violence:     Fear of Current or Ex-Partner: Not on file    Emotionally Abused: Not on file    Physically Abused: Not on file    Sexually Abused: Not on file   Housing Stability:     Unable to Pay for Housing in the Last Year: Not on file    Number of [place] in the Last Year: Not on file    Unstable Housing in the Last Year: Not on file         ALLERGIES: Codeine, Contrast agent [iodine], Hydrocodone, and Morphine    Review of Systems   Constitutional: Negative for chills and fever. HENT: Negative for rhinorrhea and sore throat. Eyes: Negative for pain and redness. Respiratory: Negative for cough and shortness of breath. Cardiovascular: Negative for chest pain and leg swelling. Gastrointestinal: Negative for abdominal pain, nausea and vomiting. Endocrine: Negative for polydipsia and polyuria. Genitourinary: Negative for decreased urine volume, dysuria and frequency.    Musculoskeletal: Positive for arthralgias. Negative for back pain. Skin: Negative for color change and rash. Allergic/Immunologic: Negative for environmental allergies, food allergies and immunocompromised state. Neurological: Negative for dizziness and headaches. Hematological: Negative for adenopathy. Does not bruise/bleed easily. Psychiatric/Behavioral: Negative for agitation and hallucinations. All other systems reviewed and are negative. Vitals:    03/04/22 2018 03/04/22 2102   BP: (!) 164/71 (!) 153/76   Pulse: 80 68   Resp: 19 17   Temp: 98 °F (36.7 °C)    SpO2: 94% 94%   Weight: 94.8 kg (209 lb)    Height: 5' 1\" (1.549 m)             Physical Exam  Vitals and nursing note reviewed. Exam conducted with a chaperone present. Constitutional:       General: She is not in acute distress. Appearance: She is not toxic-appearing. Comments: Chronically ill appearing   HENT:      Head: Normocephalic and atraumatic. Right Ear: External ear normal.      Left Ear: External ear normal.      Nose: Nose normal. No rhinorrhea. Mouth/Throat:      Mouth: Mucous membranes are moist.      Pharynx: Oropharynx is clear. Eyes:      General:         Right eye: No discharge. Left eye: No discharge. Cardiovascular:      Rate and Rhythm: Normal rate and regular rhythm. Pulses: Normal pulses. Heart sounds: Normal heart sounds. Pulmonary:      Effort: Pulmonary effort is normal. No respiratory distress. Breath sounds: Normal breath sounds. No wheezing, rhonchi or rales. Abdominal:      General: Abdomen is flat. Bowel sounds are normal.   Musculoskeletal:      Cervical back: Normal range of motion and neck supple. Comments: L elbow appears unremarkable, tender, drom, nvi   Skin:     General: Skin is warm and dry. Capillary Refill: Capillary refill takes less than 2 seconds. Neurological:      General: No focal deficit present.       Mental Status: She is alert and oriented to person, place, and time. Psychiatric:         Mood and Affect: Mood normal.         Behavior: Behavior normal.        Reevaluation 0310:  Improved. Discussed results and dc planning.

## 2022-03-05 NOTE — ED NOTES
Assumed care of patient. A&Ox4; no acute distress; no respiratory distress. 1 adult male and 1 adult female accompanying patient.

## 2022-03-07 ENCOUNTER — HOSPITAL ENCOUNTER (OUTPATIENT)
Dept: RADIATION THERAPY | Age: 77
Discharge: HOME | End: 2022-03-07
Payer: MEDICARE

## 2022-03-07 PROCEDURE — 77412 RADIATION TX DELIVERY LVL 3: CPT

## 2022-03-07 PROCEDURE — 77336 RADIATION PHYSICS CONSULT: CPT

## 2022-03-19 PROBLEM — S42.301A RIGHT HUMERAL FRACTURE: Status: ACTIVE | Noted: 2022-01-19

## 2022-03-19 PROBLEM — M84.40XA PATHOLOGICAL FRACTURE: Status: ACTIVE | Noted: 2022-01-22

## 2022-04-03 ENCOUNTER — HOSPITAL ENCOUNTER (INPATIENT)
Age: 77
LOS: 3 days | Discharge: HOME HEALTH | DRG: 193 | End: 2022-04-06
Attending: EMERGENCY MEDICINE | Admitting: HOSPITALIST
Payer: MEDICARE

## 2022-04-03 ENCOUNTER — APPOINTMENT (OUTPATIENT)
Dept: GENERAL RADIOLOGY | Age: 77
DRG: 193 | End: 2022-04-03
Attending: EMERGENCY MEDICINE
Payer: MEDICARE

## 2022-04-03 DIAGNOSIS — J18.9 COMMUNITY ACQUIRED PNEUMONIA, UNSPECIFIED LATERALITY: Primary | ICD-10-CM

## 2022-04-03 LAB
ALBUMIN SERPL-MCNC: 2.5 G/DL (ref 3.5–5)
ALBUMIN/GLOB SERPL: 0.7 {RATIO} (ref 1.1–2.2)
ALP SERPL-CCNC: 89 U/L (ref 45–117)
ALT SERPL-CCNC: 27 U/L (ref 12–78)
ANION GAP SERPL CALC-SCNC: 4 MMOL/L (ref 5–15)
AST SERPL W P-5'-P-CCNC: 8 U/L (ref 15–37)
BASOPHILS # BLD: 0 K/UL (ref 0–0.1)
BASOPHILS NFR BLD: 0 % (ref 0–1)
BILIRUB SERPL-MCNC: 1.1 MG/DL (ref 0.2–1)
BUN SERPL-MCNC: 27 MG/DL (ref 6–20)
BUN/CREAT SERPL: 33 (ref 12–20)
CA-I BLD-MCNC: 8.8 MG/DL (ref 8.5–10.1)
CHLORIDE SERPL-SCNC: 94 MMOL/L (ref 97–108)
CO2 SERPL-SCNC: 35 MMOL/L (ref 21–32)
CREAT SERPL-MCNC: 0.82 MG/DL (ref 0.55–1.02)
DIFFERENTIAL METHOD BLD: ABNORMAL
EOSINOPHIL # BLD: 0.1 K/UL (ref 0–0.4)
EOSINOPHIL NFR BLD: 2 % (ref 0–7)
ERYTHROCYTE [DISTWIDTH] IN BLOOD BY AUTOMATED COUNT: 12.6 % (ref 11.5–14.5)
GLOBULIN SER CALC-MCNC: 3.4 G/DL (ref 2–4)
GLUCOSE SERPL-MCNC: 150 MG/DL (ref 65–100)
HCT VFR BLD AUTO: 38.5 % (ref 35–47)
HGB BLD-MCNC: 12.6 G/DL (ref 11.5–16)
IMM GRANULOCYTES # BLD AUTO: 0 K/UL
IMM GRANULOCYTES NFR BLD AUTO: 0 %
LYMPHOCYTES # BLD: 0.2 K/UL (ref 0.8–3.5)
LYMPHOCYTES NFR BLD: 3 % (ref 12–49)
MCH RBC QN AUTO: 30.3 PG (ref 26–34)
MCHC RBC AUTO-ENTMCNC: 32.7 G/DL (ref 30–36.5)
MCV RBC AUTO: 92.5 FL (ref 80–99)
MONOCYTES # BLD: 0.7 K/UL (ref 0–1)
MONOCYTES NFR BLD: 14 % (ref 5–13)
NEUTS BAND NFR BLD MANUAL: 18 % (ref 0–6)
NEUTS SEG # BLD: 4 K/UL (ref 1.8–8)
NEUTS SEG NFR BLD: 62 % (ref 32–75)
NRBC # BLD: 0 K/UL (ref 0–0.01)
NRBC BLD-RTO: 0 PER 100 WBC
OTHER CELLS NFR BLD MANUAL: 1 %
PLATELET # BLD AUTO: 225 K/UL (ref 150–400)
PMV BLD AUTO: 10.9 FL (ref 8.9–12.9)
POTASSIUM SERPL-SCNC: 3.9 MMOL/L (ref 3.5–5.1)
PROT SERPL-MCNC: 5.9 G/DL (ref 6.4–8.2)
RBC # BLD AUTO: 4.16 M/UL (ref 3.8–5.2)
RBC MORPH BLD: ABNORMAL
SARS-COV-2, COV2: NORMAL
SODIUM SERPL-SCNC: 133 MMOL/L (ref 136–145)
TROPONIN-HIGH SENSITIVITY: 15 NG/L (ref 0–51)
WBC # BLD AUTO: 5 K/UL (ref 3.6–11)

## 2022-04-03 PROCEDURE — 65270000029 HC RM PRIVATE

## 2022-04-03 PROCEDURE — 80053 COMPREHEN METABOLIC PANEL: CPT

## 2022-04-03 PROCEDURE — 74011250637 HC RX REV CODE- 250/637: Performed by: EMERGENCY MEDICINE

## 2022-04-03 PROCEDURE — 99285 EMERGENCY DEPT VISIT HI MDM: CPT

## 2022-04-03 PROCEDURE — 74011250636 HC RX REV CODE- 250/636: Performed by: EMERGENCY MEDICINE

## 2022-04-03 PROCEDURE — 84484 ASSAY OF TROPONIN QUANT: CPT

## 2022-04-03 PROCEDURE — 71045 X-RAY EXAM CHEST 1 VIEW: CPT

## 2022-04-03 PROCEDURE — 74011000250 HC RX REV CODE- 250: Performed by: EMERGENCY MEDICINE

## 2022-04-03 PROCEDURE — 85025 COMPLETE CBC W/AUTO DIFF WBC: CPT

## 2022-04-03 PROCEDURE — 96375 TX/PRO/DX INJ NEW DRUG ADDON: CPT

## 2022-04-03 PROCEDURE — 93005 ELECTROCARDIOGRAM TRACING: CPT

## 2022-04-03 PROCEDURE — 96374 THER/PROPH/DIAG INJ IV PUSH: CPT

## 2022-04-03 PROCEDURE — U0005 INFEC AGEN DETEC AMPLI PROBE: HCPCS

## 2022-04-03 PROCEDURE — 36415 COLL VENOUS BLD VENIPUNCTURE: CPT

## 2022-04-03 RX ORDER — ENOXAPARIN SODIUM 100 MG/ML
40 INJECTION SUBCUTANEOUS EVERY 24 HOURS
Status: DISCONTINUED | OUTPATIENT
Start: 2022-04-04 | End: 2022-04-06 | Stop reason: HOSPADM

## 2022-04-03 RX ORDER — ALBUTEROL SULFATE 90 UG/1
2 AEROSOL, METERED RESPIRATORY (INHALATION)
Status: DISCONTINUED | OUTPATIENT
Start: 2022-04-03 | End: 2022-04-06 | Stop reason: HOSPADM

## 2022-04-03 RX ORDER — ACETAMINOPHEN 325 MG/1
650 TABLET ORAL
Status: DISCONTINUED | OUTPATIENT
Start: 2022-04-03 | End: 2022-04-06 | Stop reason: HOSPADM

## 2022-04-03 RX ORDER — LINACLOTIDE 145 UG/1
145 CAPSULE, GELATIN COATED ORAL DAILY
COMMUNITY
Start: 2022-04-01

## 2022-04-03 RX ORDER — SODIUM CHLORIDE 0.9 % (FLUSH) 0.9 %
5-40 SYRINGE (ML) INJECTION AS NEEDED
Status: DISCONTINUED | OUTPATIENT
Start: 2022-04-03 | End: 2022-04-06 | Stop reason: HOSPADM

## 2022-04-03 RX ORDER — SODIUM CHLORIDE 9 MG/ML
75 INJECTION, SOLUTION INTRAVENOUS CONTINUOUS
Status: DISPENSED | OUTPATIENT
Start: 2022-04-03 | End: 2022-04-04

## 2022-04-03 RX ORDER — ONDANSETRON 8 MG/1
8 TABLET, ORALLY DISINTEGRATING ORAL
COMMUNITY
Start: 2022-02-18

## 2022-04-03 RX ORDER — MAGNESIUM SULFATE 100 %
4 CRYSTALS MISCELLANEOUS AS NEEDED
Status: DISCONTINUED | OUTPATIENT
Start: 2022-04-03 | End: 2022-04-06 | Stop reason: HOSPADM

## 2022-04-03 RX ORDER — ATENOLOL 25 MG/1
25 TABLET ORAL DAILY
COMMUNITY
Start: 2022-01-05

## 2022-04-03 RX ORDER — FLUOXETINE HYDROCHLORIDE 20 MG/1
20 CAPSULE ORAL DAILY
COMMUNITY
Start: 2022-02-18

## 2022-04-03 RX ORDER — INSULIN LISPRO 100 [IU]/ML
INJECTION, SOLUTION INTRAVENOUS; SUBCUTANEOUS
Status: DISCONTINUED | OUTPATIENT
Start: 2022-04-04 | End: 2022-04-06 | Stop reason: HOSPADM

## 2022-04-03 RX ORDER — SODIUM CHLORIDE 0.9 % (FLUSH) 0.9 %
5-40 SYRINGE (ML) INJECTION EVERY 8 HOURS
Status: DISCONTINUED | OUTPATIENT
Start: 2022-04-03 | End: 2022-04-06 | Stop reason: HOSPADM

## 2022-04-03 RX ORDER — IPRATROPIUM BROMIDE AND ALBUTEROL SULFATE 2.5; .5 MG/3ML; MG/3ML
3 SOLUTION RESPIRATORY (INHALATION)
Status: COMPLETED | OUTPATIENT
Start: 2022-04-03 | End: 2022-04-03

## 2022-04-03 RX ORDER — TRAMADOL HYDROCHLORIDE 50 MG/1
50 TABLET ORAL
COMMUNITY
Start: 2022-03-03

## 2022-04-03 RX ORDER — METFORMIN HYDROCHLORIDE 500 MG/1
500 TABLET, EXTENDED RELEASE ORAL DAILY
COMMUNITY
Start: 2022-02-07

## 2022-04-03 RX ORDER — LENALIDOMIDE 25 MG/1
25 CAPSULE ORAL DAILY
COMMUNITY
Start: 2022-03-11

## 2022-04-03 RX ORDER — ACYCLOVIR 200 MG/1
200 CAPSULE ORAL 2 TIMES DAILY
COMMUNITY
Start: 2022-03-02

## 2022-04-03 RX ORDER — AZITHROMYCIN 500 MG/1
1000 TABLET, FILM COATED ORAL
Status: COMPLETED | OUTPATIENT
Start: 2022-04-03 | End: 2022-04-03

## 2022-04-03 RX ORDER — DEXTROSE MONOHYDRATE 100 MG/ML
0-250 INJECTION, SOLUTION INTRAVENOUS AS NEEDED
Status: DISCONTINUED | OUTPATIENT
Start: 2022-04-03 | End: 2022-04-06 | Stop reason: HOSPADM

## 2022-04-03 RX ORDER — PROCHLORPERAZINE MALEATE 10 MG
10 TABLET ORAL
COMMUNITY
Start: 2022-03-10

## 2022-04-03 RX ADMIN — IPRATROPIUM BROMIDE AND ALBUTEROL SULFATE 3 ML: .5; 2.5 SOLUTION RESPIRATORY (INHALATION) at 21:29

## 2022-04-03 RX ADMIN — AZITHROMYCIN MONOHYDRATE 1000 MG: 500 TABLET ORAL at 22:55

## 2022-04-03 RX ADMIN — IPRATROPIUM BROMIDE AND ALBUTEROL SULFATE 3 ML: .5; 2.5 SOLUTION RESPIRATORY (INHALATION) at 22:40

## 2022-04-03 RX ADMIN — WATER 1 G: 1 INJECTION INTRAMUSCULAR; INTRAVENOUS; SUBCUTANEOUS at 22:55

## 2022-04-03 RX ADMIN — IPRATROPIUM BROMIDE AND ALBUTEROL SULFATE 3 ML: .5; 2.5 SOLUTION RESPIRATORY (INHALATION) at 22:04

## 2022-04-03 RX ADMIN — METHYLPREDNISOLONE SODIUM SUCCINATE 125 MG: 125 INJECTION, POWDER, FOR SOLUTION INTRAMUSCULAR; INTRAVENOUS at 21:31

## 2022-04-03 NOTE — ED NOTES
[facility], EKG obtained, pt placed on bedside cardiac monitor, PIV access and labs obtained. Pt awaiting primary eval by provider and CXR at this time. Stretcher low locked, rails raised, call bell and personal belongings at bedside. Pt's daughter at bedside at this time who informs this RN that pt is on oral chemo and \"the shots in her stomach on Mondays. \"

## 2022-04-03 NOTE — ED TRIAGE NOTES
Pt arrives with shortness of breath that began yesterday. Endorses chest pain that comes and goes. Denies cough, fever, chills.  In NAD, VSS, Afebrile

## 2022-04-04 LAB
ALBUMIN SERPL-MCNC: 2 G/DL (ref 3.5–5)
ALBUMIN/GLOB SERPL: 0.7 {RATIO} (ref 1.1–2.2)
ALP SERPL-CCNC: 73 U/L (ref 45–117)
ALT SERPL-CCNC: 22 U/L (ref 12–78)
ANION GAP SERPL CALC-SCNC: 5 MMOL/L (ref 5–15)
AST SERPL W P-5'-P-CCNC: 8 U/L (ref 15–37)
ATRIAL RATE: 75 BPM
BASOPHILS # BLD: 0 K/UL (ref 0–0.1)
BASOPHILS NFR BLD: 0 % (ref 0–1)
BILIRUB SERPL-MCNC: 0.7 MG/DL (ref 0.2–1)
BUN SERPL-MCNC: 25 MG/DL (ref 6–20)
BUN/CREAT SERPL: 47 (ref 12–20)
CA-I BLD-MCNC: 8 MG/DL (ref 8.5–10.1)
CALCULATED P AXIS, ECG09: 16 DEGREES
CALCULATED R AXIS, ECG10: -22 DEGREES
CALCULATED T AXIS, ECG11: 67 DEGREES
CHLORIDE SERPL-SCNC: 97 MMOL/L (ref 97–108)
CO2 SERPL-SCNC: 33 MMOL/L (ref 21–32)
COVID-19 RAPID TEST, COVR: NOT DETECTED
CREAT SERPL-MCNC: 0.53 MG/DL (ref 0.55–1.02)
DIAGNOSIS, 93000: NORMAL
DIFFERENTIAL METHOD BLD: ABNORMAL
EOSINOPHIL # BLD: 0 K/UL (ref 0–0.4)
EOSINOPHIL NFR BLD: 0 % (ref 0–7)
ERYTHROCYTE [DISTWIDTH] IN BLOOD BY AUTOMATED COUNT: 12.6 % (ref 11.5–14.5)
EST. AVERAGE GLUCOSE BLD GHB EST-MCNC: 117 MG/DL
GLOBULIN SER CALC-MCNC: 2.9 G/DL (ref 2–4)
GLUCOSE BLD STRIP.AUTO-MCNC: 150 MG/DL (ref 65–117)
GLUCOSE BLD STRIP.AUTO-MCNC: 151 MG/DL (ref 65–117)
GLUCOSE BLD STRIP.AUTO-MCNC: 155 MG/DL (ref 65–117)
GLUCOSE BLD STRIP.AUTO-MCNC: 195 MG/DL (ref 65–117)
GLUCOSE SERPL-MCNC: 161 MG/DL (ref 65–100)
HBA1C MFR BLD: 5.7 % (ref 4–5.6)
HCT VFR BLD AUTO: 32.7 % (ref 35–47)
HGB BLD-MCNC: 10.7 G/DL (ref 11.5–16)
IMM GRANULOCYTES # BLD AUTO: 0 K/UL
IMM GRANULOCYTES NFR BLD AUTO: 0 %
LYMPHOCYTES # BLD: 0.1 K/UL (ref 0.8–3.5)
LYMPHOCYTES NFR BLD: 4 % (ref 12–49)
MCH RBC QN AUTO: 30.5 PG (ref 26–34)
MCHC RBC AUTO-ENTMCNC: 32.7 G/DL (ref 30–36.5)
MCV RBC AUTO: 93.2 FL (ref 80–99)
METAMYELOCYTES NFR BLD MANUAL: 5 %
MONOCYTES # BLD: 0.2 K/UL (ref 0–1)
MONOCYTES NFR BLD: 5 % (ref 5–13)
MYELOCYTES NFR BLD MANUAL: 1 %
NEUTS BAND NFR BLD MANUAL: 16 % (ref 0–6)
NEUTS SEG # BLD: 2.6 K/UL (ref 1.8–8)
NEUTS SEG NFR BLD: 69 % (ref 32–75)
NRBC # BLD: 0 K/UL (ref 0–0.01)
NRBC BLD-RTO: 0 PER 100 WBC
P-R INTERVAL, ECG05: 140 MS
PERFORMED BY, TECHID: ABNORMAL
PLATELET # BLD AUTO: 179 K/UL (ref 150–400)
PMV BLD AUTO: 11 FL (ref 8.9–12.9)
POTASSIUM SERPL-SCNC: 4.3 MMOL/L (ref 3.5–5.1)
PROT SERPL-MCNC: 4.9 G/DL (ref 6.4–8.2)
Q-T INTERVAL, ECG07: 406 MS
QRS DURATION, ECG06: 110 MS
QTC CALCULATION (BEZET), ECG08: 453 MS
RBC # BLD AUTO: 3.51 M/UL (ref 3.8–5.2)
RBC MORPH BLD: ABNORMAL
SARS-COV-2, XPLCVT: NOT DETECTED
SODIUM SERPL-SCNC: 135 MMOL/L (ref 136–145)
SOURCE, COVRS: NORMAL
VENTRICULAR RATE, ECG03: 75 BPM
WBC # BLD AUTO: 3.1 K/UL (ref 3.6–11)

## 2022-04-04 PROCEDURE — 87635 SARS-COV-2 COVID-19 AMP PRB: CPT

## 2022-04-04 PROCEDURE — 87040 BLOOD CULTURE FOR BACTERIA: CPT

## 2022-04-04 PROCEDURE — 65270000029 HC RM PRIVATE

## 2022-04-04 PROCEDURE — 74011250637 HC RX REV CODE- 250/637: Performed by: INTERNAL MEDICINE

## 2022-04-04 PROCEDURE — 74011250636 HC RX REV CODE- 250/636: Performed by: INTERNAL MEDICINE

## 2022-04-04 PROCEDURE — 80053 COMPREHEN METABOLIC PANEL: CPT

## 2022-04-04 PROCEDURE — 74011636637 HC RX REV CODE- 636/637: Performed by: HOSPITALIST

## 2022-04-04 PROCEDURE — 97165 OT EVAL LOW COMPLEX 30 MIN: CPT

## 2022-04-04 PROCEDURE — 74011250636 HC RX REV CODE- 250/636: Performed by: HOSPITALIST

## 2022-04-04 PROCEDURE — 82962 GLUCOSE BLOOD TEST: CPT

## 2022-04-04 PROCEDURE — 92610 EVALUATE SWALLOWING FUNCTION: CPT

## 2022-04-04 PROCEDURE — 74011000250 HC RX REV CODE- 250: Performed by: HOSPITALIST

## 2022-04-04 PROCEDURE — 85025 COMPLETE CBC W/AUTO DIFF WBC: CPT

## 2022-04-04 PROCEDURE — 94640 AIRWAY INHALATION TREATMENT: CPT

## 2022-04-04 PROCEDURE — 83036 HEMOGLOBIN GLYCOSYLATED A1C: CPT

## 2022-04-04 PROCEDURE — 97530 THERAPEUTIC ACTIVITIES: CPT

## 2022-04-04 RX ORDER — ASPIRIN 325 MG
325 TABLET ORAL DAILY
Status: DISCONTINUED | OUTPATIENT
Start: 2022-04-04 | End: 2022-04-06 | Stop reason: HOSPADM

## 2022-04-04 RX ORDER — ATENOLOL 50 MG/1
25 TABLET ORAL DAILY
Status: DISCONTINUED | OUTPATIENT
Start: 2022-04-04 | End: 2022-04-06 | Stop reason: HOSPADM

## 2022-04-04 RX ORDER — ERGOCALCIFEROL 1.25 MG/1
50000 CAPSULE ORAL
Status: DISCONTINUED | OUTPATIENT
Start: 2022-04-04 | End: 2022-04-06 | Stop reason: HOSPADM

## 2022-04-04 RX ORDER — BUDESONIDE AND FORMOTEROL FUMARATE DIHYDRATE 160; 4.5 UG/1; UG/1
2 AEROSOL RESPIRATORY (INHALATION)
Status: DISCONTINUED | OUTPATIENT
Start: 2022-04-04 | End: 2022-04-06 | Stop reason: HOSPADM

## 2022-04-04 RX ORDER — FLUOXETINE HYDROCHLORIDE 20 MG/1
20 CAPSULE ORAL DAILY
Status: DISCONTINUED | OUTPATIENT
Start: 2022-04-04 | End: 2022-04-06 | Stop reason: HOSPADM

## 2022-04-04 RX ORDER — LENALIDOMIDE 25 MG/1
25 CAPSULE ORAL ONCE
Status: COMPLETED | OUTPATIENT
Start: 2022-04-04 | End: 2022-04-04

## 2022-04-04 RX ORDER — TRAMADOL HYDROCHLORIDE 50 MG/1
50 TABLET ORAL
Status: DISCONTINUED | OUTPATIENT
Start: 2022-04-04 | End: 2022-04-06 | Stop reason: HOSPADM

## 2022-04-04 RX ORDER — PANTOPRAZOLE SODIUM 20 MG/1
20 TABLET, DELAYED RELEASE ORAL DAILY
Status: DISCONTINUED | OUTPATIENT
Start: 2022-04-04 | End: 2022-04-05

## 2022-04-04 RX ORDER — MULTIVITAMIN
1 TABLET ORAL DAILY
Status: DISCONTINUED | OUTPATIENT
Start: 2022-04-04 | End: 2022-04-06 | Stop reason: HOSPADM

## 2022-04-04 RX ORDER — ACYCLOVIR 200 MG/1
200 CAPSULE ORAL 2 TIMES DAILY
Status: DISCONTINUED | OUTPATIENT
Start: 2022-04-04 | End: 2022-04-06 | Stop reason: HOSPADM

## 2022-04-04 RX ORDER — IPRATROPIUM BROMIDE AND ALBUTEROL SULFATE 2.5; .5 MG/3ML; MG/3ML
3 SOLUTION RESPIRATORY (INHALATION)
Status: DISCONTINUED | OUTPATIENT
Start: 2022-04-04 | End: 2022-04-06 | Stop reason: HOSPADM

## 2022-04-04 RX ADMIN — METHYLPREDNISOLONE SODIUM SUCCINATE 40 MG: 40 INJECTION, POWDER, FOR SOLUTION INTRAMUSCULAR; INTRAVENOUS at 14:20

## 2022-04-04 RX ADMIN — METHYLPREDNISOLONE SODIUM SUCCINATE 40 MG: 40 INJECTION, POWDER, FOR SOLUTION INTRAMUSCULAR; INTRAVENOUS at 22:25

## 2022-04-04 RX ADMIN — BUDESONIDE AND FORMOTEROL FUMARATE DIHYDRATE 2 PUFF: 160; 4.5 AEROSOL RESPIRATORY (INHALATION) at 21:17

## 2022-04-04 RX ADMIN — SODIUM CHLORIDE, PRESERVATIVE FREE 10 ML: 5 INJECTION INTRAVENOUS at 00:32

## 2022-04-04 RX ADMIN — SODIUM CHLORIDE, PRESERVATIVE FREE 10 ML: 5 INJECTION INTRAVENOUS at 04:58

## 2022-04-04 RX ADMIN — CEFEPIME HYDROCHLORIDE 1 G: 1 INJECTION, POWDER, FOR SOLUTION INTRAMUSCULAR; INTRAVENOUS at 00:41

## 2022-04-04 RX ADMIN — SODIUM CHLORIDE 75 ML/HR: 9 INJECTION, SOLUTION INTRAVENOUS at 12:13

## 2022-04-04 RX ADMIN — CEFEPIME HYDROCHLORIDE 1 G: 1 INJECTION, POWDER, FOR SOLUTION INTRAMUSCULAR; INTRAVENOUS at 22:24

## 2022-04-04 RX ADMIN — LINACLOTIDE 145 MCG: 145 CAPSULE, GELATIN COATED ORAL at 10:37

## 2022-04-04 RX ADMIN — AZITHROMYCIN 500 MG: 500 INJECTION, POWDER, LYOPHILIZED, FOR SOLUTION INTRAVENOUS at 22:24

## 2022-04-04 RX ADMIN — ASPIRIN 325 MG ORAL TABLET 325 MG: 325 PILL ORAL at 09:21

## 2022-04-04 RX ADMIN — INSULIN LISPRO 3 UNITS: 100 INJECTION, SOLUTION INTRAVENOUS; SUBCUTANEOUS at 12:39

## 2022-04-04 RX ADMIN — INSULIN LISPRO 3 UNITS: 100 INJECTION, SOLUTION INTRAVENOUS; SUBCUTANEOUS at 17:05

## 2022-04-04 RX ADMIN — AZITHROMYCIN 500 MG: 500 INJECTION, POWDER, LYOPHILIZED, FOR SOLUTION INTRAVENOUS at 00:20

## 2022-04-04 RX ADMIN — METHYLPREDNISOLONE SODIUM SUCCINATE 40 MG: 40 INJECTION, POWDER, FOR SOLUTION INTRAMUSCULAR; INTRAVENOUS at 09:21

## 2022-04-04 RX ADMIN — MULTIVITAMIN TABLET 1 TABLET: TABLET at 09:21

## 2022-04-04 RX ADMIN — ATENOLOL 25 MG: 50 TABLET ORAL at 09:20

## 2022-04-04 RX ADMIN — ACYCLOVIR 200 MG: 200 CAPSULE ORAL at 09:21

## 2022-04-04 RX ADMIN — SODIUM CHLORIDE, PRESERVATIVE FREE 10 ML: 5 INJECTION INTRAVENOUS at 22:40

## 2022-04-04 RX ADMIN — LENALIDOMIDE 25 MG: 25 CAPSULE ORAL at 12:12

## 2022-04-04 RX ADMIN — ENOXAPARIN SODIUM 40 MG: 40 INJECTION SUBCUTANEOUS at 04:57

## 2022-04-04 RX ADMIN — SODIUM CHLORIDE, PRESERVATIVE FREE 10 ML: 5 INJECTION INTRAVENOUS at 00:41

## 2022-04-04 RX ADMIN — SODIUM CHLORIDE 125 ML/HR: 9 INJECTION, SOLUTION INTRAVENOUS at 00:20

## 2022-04-04 RX ADMIN — ACYCLOVIR 200 MG: 200 CAPSULE ORAL at 22:25

## 2022-04-04 RX ADMIN — FLUOXETINE 20 MG: 20 CAPSULE ORAL at 09:21

## 2022-04-04 RX ADMIN — SODIUM CHLORIDE, PRESERVATIVE FREE 10 ML: 5 INJECTION INTRAVENOUS at 14:20

## 2022-04-04 RX ADMIN — CEFEPIME HYDROCHLORIDE 1 G: 1 INJECTION, POWDER, FOR SOLUTION INTRAMUSCULAR; INTRAVENOUS at 12:39

## 2022-04-04 RX ADMIN — PANTOPRAZOLE SODIUM 20 MG: 20 TABLET, DELAYED RELEASE ORAL at 09:21

## 2022-04-04 RX ADMIN — INSULIN LISPRO 3 UNITS: 100 INJECTION, SOLUTION INTRAVENOUS; SUBCUTANEOUS at 09:21

## 2022-04-04 NOTE — ED NOTES
TRANSFER - OUT REPORT:    Verbal report given to Edgardo Springer on [facility]  being transferred to (unit) for routine progression of care       Report consisted of patients Situation, Background, Assessment and   Recommendations(SBAR). Information from the following report(s) SBAR, ED Summary and MAR was reviewed with the receiving nurse. Lines:   Peripheral IV 04/03/22 Right Antecubital (Active)   Site Assessment Clean, dry, & intact 04/03/22 2007   Phlebitis Assessment 0 04/03/22 2007   Infiltration Assessment 0 04/03/22 2007   Dressing Status Clean, dry, & intact 04/03/22 2007   Dressing Type Transparent 04/03/22 2007        Opportunity for questions and clarification was provided.       Patient transported with:   Monitor  O2 @ 2 liters

## 2022-04-04 NOTE — PROGRESS NOTES
Assume care of pt.  Report received from [facility], [address] [facility]

## 2022-04-04 NOTE — PROGRESS NOTES
Reason for Admission: Pneumonia                       RUR Score: 17%                 PCP: First and Last name:   Fred Carney MD     Name of Practice:    Are you a current patient: Yes/No: yes   Approximate date of last visit: Friday 4/1   Can you participate in a virtual visit if needed: yes    Do you (patient/family) have any concerns for transition/discharge? No issues at this time               Plan for utilizing home health:  Patient reports she currently has [facility] through [facility]- will try to verify which agency. Current Advanced Directive/Advance Care Plan:  Full Code      Healthcare Decision Maker:   Primary Healthcare Decision Maker: Cristi Valladares (son) [phone]  Primary Healthcare Decision Maker: Julián Noland (daughter) - [phone]  Click here to [address] including selection of the Healthcare Decision Maker Relationship (ie \"Primary\")              Transition of Care Plan: CM met with the patient at the bedside to complete dc planning assessment. Patient reported she lives at home with her daughter, Melanie Youngblood. She reported she is not independent and her daughter assists her with her ADL's. She reported she uses a walker at times. She also reported she has home health through [facility] but unable to recall the name of the agency. CM attempted to reach out to the patient's daughter at number listed on face sheet and it was not a working number. CM reached out to the patient's son, Fermín Young, and obtained correct phone number for daughter listed above. Verified patient's information with son. He reported she has not been walking for a few weeks now since starting chemo a few weeks ago for her multiple myeloma. They have been using a gait belt and a wheelchair. He stated she was having home health but it may have stopped.  He cannot

## 2022-04-04 NOTE — PROGRESS NOTES
PULMONARY CONSULT  [facility]    Name: Tianna Dickinson MRN: [id number]   : 1945 Hospital: [facility]   Date: 2022  Admission date: 4/3/2022 Hospital Day: 2       HPI:     Hospital Problems  Date Reviewed: 4/3/2022          Codes Class Noted POA    Multifocal pneumonia ICD-10-CM: J18.9  ICD-9-CM: 486  4/3/2022 Yes        Pneumonia ICD-10-CM: J18.9  ICD-9-CM: 512  4/3/2022 Unknown                   [x] High complexity decision making was performed  [x] See my orders for details      Subjective/Initial History:     I was asked by Juli Phillips MD to see Tianna Dickinson  a 78 y.o.  female in consultation     Excerpts from admission 4/3/2022 or consult notes as follows:     Tianna Dickinson is 78year old female with past medical history of hypertension, pulmonary nodules, carcinoma breast, recently history of right distal humeral pathological fracture, multiple myeloma presented with shortness of breath. Patient have cough and fever on admission. Chest x-ray with multifocal pneumonia bilateral. Patient is on cefepime and azithromycin. Patient is former smoker but stopped more than 30 years ago. Breathing has improved since admission. Patient is now off oxygen. Allergies   Allergen Reactions    Contrast Agent [Iodine] Hives and Itching    Codeine Nausea and Vomiting    Hydrocodone Nausea and Vomiting    Morphine Other (comments)     hallucinations         MAR reviewed and pertinent medications noted or modified as needed     Current Facility-Administered Medications   Medication    acyclovir (ZOVIRAX) capsule 200 mg    aspirin tablet 325 mg    atenoloL (TENORMIN) tablet 25 mg    FLUoxetine (PROzac) capsule 20 mg    linaCLOtide (LINZESS) capsule 145 mcg    multivitamin with folic acid (ONE DAILY WITH FOLIC ACID) tablet 1 Tablet    pantoprazole (PROTONIX) tablet 20 mg    . PHARMACY TO SUBSTITUTE PER PROTOCOL (Reordered from: Revlimid 25 mg cap)    traMADoL (ULTRAM) tablet 50 mg    methylPREDNISolone (PF) (SOLU-MEDROL) injection 40 mg    sodium chloride (NS) flush 5-40 mL    sodium chloride (NS) flush 5-40 mL    azithromycin (ZITHROMAX) 500 mg in 0.9% sodium chloride 250 mL (VIAL-MATE)    acetaminophen (TYLENOL) tablet 650 mg    albuterol (PROVENTIL HFA, VENTOLIN HFA, PROAIR HFA) inhaler 2 Puff    enoxaparin (LOVENOX) injection 40 mg    insulin lispro (HUMALOG) injection    glucose chewable tablet 16 g    glucagon (GLUCAGEN) injection 1 mg    dextrose 10% infusion 0-250 mL    cefepime (MAXIPIME) 1 g in sterile water (preservative free) 10 mL IV syringe    0.9% sodium chloride infusion      Patient PCP: Fred Carney MD  PMH:  has a past medical history of [facility] cancer ([facility] (2002), Colon polyps (10/1/2010), Diverticulosis (10/1/2010), GERD (gastroesophageal reflux disease), Hypertension, Multiple myeloma without remission ([facility] and Unspecified sleep apnea. PSH:   has a past surgical history that includes hx cholecystectomy; endoscopy, colon, diagnostic; hx breast lumpectomy (2000); hx hysterectomy (2000); pr colsc flx w/rmvl of tumor polyp lesion snare tq (3/8/2012); colonoscopy,remv lesn,snare (1/22/2015); colonoscopy,remv lesn,snare (11/12/2019); and colonoscopy (N/A, 11/12/2019). FHX: family history includes Cancer in her mother, sister, and another family member; Diabetes in her brother and sister; Hypertension in her father and mother. SHX:  reports that she quit smoking about 21 years ago. She smoked 0.00 packs per day for 20.00 years. She has never used smokeless tobacco. She reports that she does not drink alcohol and does not use drugs.      ROS:    ROS     Objective:     Vital Signs: Telemetry:    AFIB Intake/Output:   Visit Vitals  /73 (BP 1 Location: Left upper arm, BP Patient Position: At rest;Semi fowlers)   Pulse 72   Temp 97.6 °F (36.4 °C)   Resp 17   Ht 5' 1\" (1.549 m)   Wt 203 lb (92.1 kg)   SpO2 98%   BMI 38.36 kg/m²       Temp (24hrs), Av.2 °F (36.8 °C), Min:97.6 °F (36.4 °C), Max:98.8 °F (37.1 °C)        O2 Device: Nasal cannula O2 Flow Rate (L/min): 4 l/min       Wt Readings from Last 4 Encounters:   22 203 lb (92.1 kg)   22 209 lb (94.8 kg)   22 250 lb (113.4 kg)   19 224 lb 8 oz (101.8 kg)          Intake/Output Summary (Last 24 hours) at 2022 0947  Last data filed at 2022 0501  Gross per 24 hour   Intake    Output 100 ml   Net -100 ml       Last shift:      No intake/output data recorded. Last 3 shifts:  1901 -  0700  In: -   Out: 100 [Urine:100]       Physical Exam:     Physical Exam     Labs:    Recent Labs     22  0554 22   WBC 3.1* 5.0   HGB 10.7* 12.6    225     Recent Labs     22  0554 22   * 133*   K 4.3 3.9   CL 97 94*   CO2 33* 35*   * 150*   BUN 25* 27*   CREA 0.53* 0.82   CA 8.0* 8.8   ALB 2.0* 2.5*   ALT 22 27     No results for input(s): PH, PCO2, PO2, HCO3, FIO2 in the last 72 hours. No results for input(s): CPK, CKNDX, TROIQ in the last 72 hours. No lab exists for component: CPKMB  No results found for: BNPP, BNP   Lab Results   Component Value Date/Time    Culture result: Escherichia coli (A) 2022 08:30 PM     Lab Results   Component Value Date/Time    TSH 1.860 2013 03:38 PM       Imaging:    CXR Results  (Last 48 hours)               22  XR CHEST SNGL V Final result    Impression:  Multifocal airspace disease suspicious for pneumonia. Narrative:  XR CHEST SNGL V       Comparison:  2022. Single view:  Multifocal airspace disease has developed with involvement of the   right perihilar lung, right lateral lung base, and left lateral midlung. Pneumonia suspected. No pneumothorax or pleural effusion apparent. The heart   size is normal. ORIF of the right humerus partially visualized.  Surgical clips   project over the left lateral chest. Results from [facility] encounter on 04/03/22    XR CHEST SNGL V    Narrative  XR CHEST SNGL V    Comparison:  1/19/2022. Single view:  Multifocal airspace disease has developed with involvement of the  right perihilar lung, right lateral lung base, and left lateral midlung. Pneumonia suspected. No pneumothorax or pleural effusion apparent. The heart  size is normal. ORIF of the right humerus partially visualized. Surgical clips  project over the left lateral chest.    Impression  Multifocal airspace disease suspicious for pneumonia. Results from Hospital Encounter encounter on 03/04/22    XR SHOULDER LT AP/LAT MIN 2 V    Narrative  Exam: Left shoulder series    Indications: Deformity    Comparison: Elbow today    Number of views: 4    Findings: The humerus is internally rotated in all views. Thus, cannot exclude  the possibility of a posterior glenohumeral dislocation. If there is clinical  concern for such a phenomenon, axillary view or true AP Asenath Sins) view  recommended. No recent fracture, malalignment, or otherwise significant bone  finding. No evident arthropathy. Soft tissues unremarkable. Impression  The humerus is internally rotated in all views. Thus, cannot exclude  the possibility of a posterior glenohumeral dislocation. If there is clinical  concern for such a phenomenon, axillary view or true AP Asenath Sins) view  recommended. If these views are not possible, CT recommended. XR ELBOW LT MIN 3 V    Narrative  Exam: Left elbow    Indications: Deformity    Comparison: Forearm today    Number of views: 4    Findings: The exam is limited by suboptimal positioning. No recent fracture,  malalignment, or otherwise significant bone finding. No evident arthropathy. There is a probable elbow effusion. Impression  No evident recent bone lesion, though an apparent effusion suggests  the possibility of an occult fracture.     Results from [place] encounter on 03/04/22    CT UP EXT LT WO CONT    Narrative  PROCEDURE: CT UP EXT LT WO CONT    HISTORY:Elbow and shoulder pain    COMPARISON:Radiographs of the region done for March 2022    Department policy stipulates all CT scans at this facility are performed using  dose reduction optimization techniques as appropriate to the performed exam,  including the following: Automated exposure control, adjustments of the mA  and/or KVP according to the patient size, and the use of iterative  reconstruction technique. Axial images were obtained through the shoulder, humerus and elbow with  multiplanar reconstructions. No IV contrast.    Bones show no fracture or destructive lesion. The glenohumeral joint appears normally articulated. No excess fluid in the  joint. The elbow joint appears normally articulated. No excess fluid in the joint. No focal soft tissue abnormalities. No discrete abnormal fluid collections appreciated. Impression  No acute abnormality demonstrated        IMPRESSION:   1. Bilateral Multifocal pneumonia   2. History of multiple myeloma with pathologic fracture of right distal humerus   3. History of carcinoma breast  4. Paroxsymal atrial fibulation  5. History of diabetes mellitus   6. Body mass index is 38.36 kg/m². RECOMMENDATIONS/PLAN:     1. Patient is 78year old female with past medical history of hypertension, pulmonary nodules, carcinoma breast, recently history of right distal humeral pathological fracture, multiple myeloma presented with possible multifocal pneumonia. Patient is on cefepime and azithromycin. Patient is former smoker but stop more than 30 years ago. 2. Albuterol as needed  3. Agree with Empiric IV antibiotics   4. Monitor oxygen saturation   5.  DVT, SUP prophylaxis       This care involved high complexity medical decision making: I personally:  · Reviewed the flowsheet and previous days notes  · Reviewed and summarized records or history from previous days note or discussions with staff, family  · High Risk Drug therapy requiring intensive monitoring for toxicity: eg steroids, pressors, antibiotics  · Reviewed and/or ordered Clinical lab tests  · Reviewed images and/or ordered Radiology tests  · Reviewed the patients ECG / Telemetry  · Reviewed and/or adjusted NiPPV settings  · Called and arranged for Radiologic procedures or interventions  · performed or ordered Diagnostic endoscopies with identified risk factors. · discussed my assessment/management with : Nursing, Hospitalist and Family for coordination of care          [address]:  This note has been created by a medical student for educational purposes only. Please do not refer to the content of this note for clinical decision-making, billing, or other purposes. Please see attending physicians note to obtain clinical information on this patient. *

## 2022-04-04 NOTE — PROGRESS NOTES
Primary Nurse Brian Robins RN and Ella Mcdonald RN performed a dual skin assessment on this patient. Patient skin is clean, dry, and intact. No pressure injury noted on admission.

## 2022-04-04 NOTE — ED NOTES
Bilateral dorsalis pedis pulses present and equal. Dr Teja Pathak aware, instructs this RN that marking sites is not necessary.

## 2022-04-04 NOTE — PROGRESS NOTES
OCCUPATIONAL THERAPY EVALUATION  Patient: Tin Chong (30 y.o. female)  Date: 4/4/2022  Primary Diagnosis: Pneumonia [J18.9]        Precautions: fall risk       ASSESSMENT  Pt is a 77 y/o F with PMH of HTN, pulmonary nodules, carcinoma breast, recent hx of R distal humeral pathologic fx, multiple myeloma presenting to [facility] with c/o SOB, admitted 4/3/22 and being treated for PNA. Pt received semi-supine in bed upon arrival, on 4L O2 via NC, AXO to person, place and time (with cues), and agreeable to OT evaluation at this time. Per pt report, pt lives with daughter (has 24/7 care) in a one-story home with 4 WANDA and B HR, required assist from dtr with ADLs and Mod I using RW for mobility at [facility]. Other DME owned includes: shower chair, BSC, hospital bed, w/c. Of note, pt recently admitted in Jan 2022 and s/p ORIF R humeral fx 1/22, NWB R UE at that time. Pt kept NWB on this date as she was unsure if restrictions had been cleared. Based on current observations, pt presents with deficits in generalized strength/AROM, bed mobility, dynamic sitting/standing balance, functional activity tolerance, and decreased safety awareness impacting overall performance of ADLs and functional transfers/mobility. Pt currently requires SBA with additional time for rolling, min A HHA supine><sit to EOB, and total A socks 2' to limited anterior reach. Pt sit><stand CGA with gt belt; RW provided for stability (educated on NWB R UE with good understanding verbalized managing RW to bathroom, would benefit from use of quad cane next session) and CGA for safe descent onto commode. Pt able to manage rey care setup/SBA and return to bedside for remainder of g/h tasks (limited reach 2' O2 and IV lines). Basic grooming (washing face/hands) competed s/p setup for retrieval with good coordination noted. Overall, pt tolerates session fair with no c/o pain; SPO2 noted 96%> on 4L throughout all ADL/mobility at this time.  Pt would benefit from continued skilled OT services to address current impairments and improve IND and safety with self cares and functional transfers/mobility. Current OT d/c recommendation home with HHOT and 24/7 family care once medically appropriate. Other factors to consider for discharge: family/social support, DME, time since onset, severity of deficits, decline from functional baseline     Patient will benefit from skilled therapy intervention to address the above noted impairments. PLAN :  Recommendations and Planned Interventions: self care training, functional mobility training, therapeutic exercise, balance training, therapeutic activities, cognitive retraining, endurance activities, neuromuscular re-education, patient education, home safety training and family training/education    Frequency/Duration: Patient will be followed by occupational therapy:  2-3x/week to address goals. Recommendation for discharge: (in order for the patient to meet his/her long term goals)  HHOT with 24/7 family care    This discharge recommendation:  Has been made in collaboration with the attending provider and/or case management    IF patient discharges home will need the following DME: cane: quad if pt does not already own       SUBJECTIVE:   Patient stated I can walk to the bathroom.     OBJECTIVE DATA SUMMARY:   HISTORY:   Past Medical History:   Diagnosis Date    Breast cancer ([facility] 2002    Colon polyps 10/1/2010    Diverticulosis 10/1/2010    GERD (gastroesophageal reflux disease)     Hypertension     Multiple myeloma without remission ([facility]     Unspecified sleep apnea      Past Surgical History:   Procedure Laterality Date    COLONOSCOPY N/A 11/12/2019    COLONOSCOPY performed by Manas De Jesus MD at [address] [place] [address]  1/22/2015         COLONOSCOPY,MELANIA Arce  11/12/2019         ENDOSCOPY, COLON, DIAGNOSTIC      polyps removed    HX BREAST LUMPECTOMY  2000    left breast - chemo,surgery, radiation (partial mastectomy)    HX CHOLECYSTECTOMY      HX HYSTERECTOMY  2000    TAHBSO due to ?cancer - MCV    [place] COLSC FLX W/RMVL OF TUMOR POLYP LESION SNARE TQ  3/8/2012            Expanded or extensive additional review of patient history:     Home Situation  Home Environment: Private residence  # Steps to Enter: 4  Rails to Enter: Yes  Hand Rails : Bilateral  One/Two Story Residence: One story  Living Alone: No  Support Systems: Child(mikhail),Other Family Member(s) (Daughter and granddaughter)  Patient Expects to be Discharged to[de-identified] Home with home health  Current DME Used/Available at Home: Maylin Justice, rolling,Wheelchair,Commode, bedside,Shower chair,Hospital bed    EXAMINATION OF PERFORMANCE DEFICITS:  Cognitive/Behavioral Status:  Neurologic State: Alert  Orientation Level: Oriented to person;Oriented to place;Oriented to time (cues reading off of white board)  Cognition: Follows commands;Decreased attention/concentration    Hearing: Auditory  Auditory Impairment: Hard of hearing, bilateral    Range of Motion:  AROM: Generally decreased, functional                         Strength:  Strength: Generally decreased, functional                Coordination:  Coordination: Generally decreased, functional  Fine Motor Skills-Upper: Left Intact; Right Intact    Gross Motor Skills-Upper: Left Intact; Right Intact    Balance:  Sitting: Impaired; Without support  Sitting - Static: Good (unsupported)  Sitting - Dynamic: Fair (occasional)  Standing: Impaired; With support  Standing - Static: Good;Constant support  Standing - Dynamic : Fair;Constant support    Functional Mobility and Transfers for ADLs:  Bed Mobility:  Rolling: Stand-by assistance; Additional time  Supine to Sit: Minimum assistance  Sit to Supine: Minimum assistance  Scooting: Contact guard assistance    Transfers:  Sit to Stand: Contact guard assistance  Stand to Sit: Contact guard assistance  Bathroom Mobility: Contact guard assistance  Toilet Transfer : Contact guard assistance      ADL Intervention and task modifications:       Grooming  Grooming Assistance: Set-up; Supervision  Position Performed: Long sitting on bed  Washing Face: Set-up; Supervision  Washing Hands: Set-up; Supervision                   Lower Body Dressing Assistance  Socks: Total assistance (dependent)  Position Performed: Seated edge of bed    Toileting  Toileting Assistance: Set-up; Stand-by assistance (seated on commode)         Therapeutic Exercise:  Pt would benefit from UE HEP to improve overall UE AROM/strength and can be further educated in next treatment session. Functional Measure:    [address] [place] [address] AM-PACTM \"6 Clicks\"                                                       Daily Activity Inpatient Short Form  How much help from another person does the patient currently need. .. Total; A Lot A Little None   1. Putting on and taking off regular lower body clothing? []  1 [x]  2 []  3 []  4   2. Bathing (including washing, rinsing, drying)? []  1 []  2 [x]  3 []  4   3. Toileting, which includes using toilet, bedpan or urinal? [] 1 []  2 [x]  3 []  4   4. Putting on and taking off regular upper body clothing? []  1 []  2 [x]  3 []  4   5. Taking care of personal grooming such as brushing teeth? []  1 []  2 [x]  3 []  4   6. Eating meals? []  1 []  2 []  3 [x]  4   © 2007, Trustees of [address] [place] [address], under license to [employer]. All rights reserved     Score: 18/24     Interpretation of Tool:  Represents clinically-significant functional categories (i.e. Activities of daily living).   Percentage of Impairment CH    0%   CI    1-19% CJ    20-39% CK    40-59% CL    60-79% CM    80-99% CN     100%   [facility]  Score 6-24 24 23 20-22 15-19 10-14 7-9 6         Occupational Therapy Evaluation Charge Determination   History Examination Decision-Making   LOW Complexity : Brief history review  LOW Complexity : 1-3 performance deficits relating to physical, cognitive , or psychosocial skils that result in activity limitations and / or participation restrictions  MEDIUM Complexity : Patient may present with comorbidities that affect occupational performnce. Miniml to moderate modification of tasks or assistance (eg, physical or verbal ) with assesment(s) is necessary to enable patient to complete evaluation       Based on the above components, the patient evaluation is determined to be of the following complexity level: LOW   Pain Ratin/10    Activity Tolerance:   Fair    After treatment patient left in no apparent distress:    Supine in bed, Call bell within reach, Bed / chair alarm activated and Side rails x 3    COMMUNICATION/EDUCATION:   The patients plan of care was discussed with: Physical therapist, Registered nurse and Case management. Patient/family have participated as able in goal setting and plan of care. and Patient/family agree to work toward stated goals and plan of care. This patients plan of care is appropriate for delegation to [facility].     Thank you for this referral.  Hailey Tobias  Time Calculation: 39 mins    Problem: Self Care Deficits Care Plan (Adult)  Goal: *Acute Goals and Plan of Care (Insert Text)  Description: Pt will be Mod I sup <> sit in prep for EOB ADLs  Pt will be Mod I grooming standing sink side LRAD  Pt will be Mod I UB dressing sitting EOB/long sit   Pt will be Mod I LE dressing sitting EOB/long sit  Pt will be Mod I sit <>  prep for toileting LRAD  Pt will be Mod I toileting/toilet transfer/cloth mgmt LRAD  Pt will be IND following UE HEP in prep for self care tasks      Outcome: Not Met

## 2022-04-04 NOTE — PROGRESS NOTES
Reason for Admission:  SOB                     RUR Score:          18%           Plan for utilizing home health:      Open to receiving, prefers [address] [place] [address]     PCP: First and Last name:  Gibson Perez MD     Name of Practice:    Are you a current patient: Yes/No: Yes   Approximate date of last visit: 04/01/2022   Can you participate in a virtual visit with your PCP:                     Current Advanced Directive/Advance Care Plan: Full Code      Healthcare Decision Maker:   Click here to complete [address] including selection of the Healthcare Decision Maker Relationship (ie \"Primary\")             Primary Decision Maker: Su Bryan - [phone]    Primary Decision Maker: Celebharat Aase - Daughter - [phone]                  Transition of Care Plan:                      CM met with patient to complete discharge planning assessment. Patient lives with her daughter in a single story home with steps to enter and egress. She uses a walker for mobility and her daughter drives her to appointments. She is open to receiving home health services, and prefers to receive services from [address] [place] [address]. Choice letter completed and placed on chart. Referral sent via [facility]. Discharge disposition is home with home health services. CM team will continue to follow.

## 2022-04-04 NOTE — H&P
History and Physical              Subjective :   Chief Complaint : Severe shortness of breath since yesterday    Source of information : Patient and daughter at bedside. History of present illness:   78 y.o. female history of hypertension, carcinoma breast, recently history of right distal humeral pathological fracture that was diagnosed multiple myeloma for that she received radiation treatment, on chemotherapy at this time presents to the emergency room complaining of significant shortness of breath started yesterday. It is getting worse, becoming very weak and tired unable to even speak well. In the emergency room she was trying to say but do not have energy to even talk. Daughter providing all the information. Denies any fever or chills that she noticed her, but feels very weak from baseline, unable to even move around. Admits cough with green-colored sputum significant amount, pain on right chest wall when she is coughing. No sick contacts at home. No exacerbating relieving factors.   Chest x-ray with multifocal pneumonia bilateral.    Past Medical History:   Diagnosis Date    Breast cancer ([facility] 2002    Colon polyps 10/1/2010    Diverticulosis 10/1/2010    GERD (gastroesophageal reflux disease)     Hypertension     Multiple myeloma without remission ([facility]     Unspecified sleep apnea      Past Surgical History:   Procedure Laterality Date    COLONOSCOPY N/A 11/12/2019    COLONOSCOPY performed by Becki Merrill MD at [facility] ENDOSCOPY    [facility]  1/22/2015         [facility]  11/12/2019         ENDOSCOPY, COLON, DIAGNOSTIC      polyps removed    HX BREAST LUMPECTOMY  2000    left breast - chemo,surgery, radiation (partial mastectomy)    HX CHOLECYSTECTOMY      HX HYSTERECTOMY  2000    TAHBSO due to ?cancer - MCV    CT COLSC FLX W/RMVL OF TUMOR POLYP LESION SNARE TQ  3/8/2012          Family History   Problem Relation Age of Onset    Cancer Mother bone    Hypertension Mother     Diabetes Sister     Cancer Sister         pancreatic?  Diabetes Brother     Hypertension Father     Cancer Other       Social History     Tobacco Use    Smoking status: Former Smoker     Packs/day: 0.00     Years: 20.00     Pack years: 0.00     Quit date: 10/1/2000     Years since quittin.5    Smokeless tobacco: Never Used   Substance Use Topics    Alcohol use: No       Prior to Admission medications    Medication Sig Start Date End Date Taking? Authorizing Provider   metFORMIN ER (GLUCOPHAGE XR) 500 mg tablet Take 500 mg by mouth daily. 22   Provider, Historical   Revlimid 25 mg cap Take 25 mg by mouth daily. 3/11/22   Provider, Historical   acyclovir (ZOVIRAX) 200 mg capsule Take 200 mg by mouth two (2) times a day. 3/2/22   Provider, Historical   FLUoxetine (PROzac) 20 mg capsule Take 20 mg by mouth daily. 22   Provider, Historical   traMADoL (ULTRAM) 50 mg tablet Take 50 mg by mouth three (3) times daily as needed for Pain. 3/3/22   Provider, Historical   atenoloL (TENORMIN) 25 mg tablet Take 25 mg by mouth daily. 22   Provider, Historical   prochlorperazine (COMPAZINE) 10 mg tablet Take 10 mg by mouth four (4) times daily as needed for Nausea. 3/10/22   Provider, Historical   Linzess 145 mcg cap capsule Take 145 mcg by mouth daily. 22   Provider, Historical   ondansetron (ZOFRAN ODT) 8 mg disintegrating tablet Take 8 mg by mouth three (3) times daily as needed for Nausea or Vomiting. 22   Provider, Historical   multivitamin (ONE A DAY) tablet Take 1 tablet by mouth daily. Provider, Historical   omeprazole (PRILOSEC) 20 mg capsule TAKE ONE CAPSULE BY MOUTH DAILY 14   Robby Lacy MD   aspirin (ASPIRIN) 325 mg tablet Take 325 mg by mouth daily.       Provider, Historical     Allergies   Allergen Reactions    Contrast Agent [Iodine] Hives and Itching    Codeine Nausea and Vomiting    Hydrocodone Nausea and Vomiting    Morphine Other (comments)     hallucinations              Review of Systems: She is very weak even to speak, so unable to get good complete information. Vitals:     Patient Vitals for the past 12 hrs:   Temp Pulse Resp BP SpO2   04/03/22 2128  69 17 (!) 142/63 96 %   04/03/22 1951  74 22 (!) 150/81 98 %   04/03/22 1933 98.8 °F (37.1 °C) 79 18 123/68 99 %       Physical Exam:   General : Well-built, looks very tired and weak, feels like no energy. Mild respiratory distress noted  HEENT : PERRLA, dry oral mucosa, atraumatic normocephalic, Normal ear and nose. Neck : Supple, no JVD, no masses noted, no carotid bruit. Lungs : Coarse breath sounds with moderate air entry bilaterally, bronchial breath sounds and diffuse wheezing noted  CVS : Rhythm rate regular, S1+, S2+, no murmur or gallop. Abdomen : Soft, nontender,  bowel sounds active. Extremities : No edema noted, feet very cold to touch. Pedal pulses palpable. Musculoskeletal : Fair range of motion, no joint swelling or effusion, muscle tone and power appears decreased. Skin : Moist, warm, skin turgor, no pathological rash. Lymphatic : No cervical lymphadenopathy. Neurological : Awake, alert, oriented to time place person. No neurological deficits. Psychiatric : Mood and affect appears appropriate to the situation.          Data Review:   Recent Results (from the past 24 hour(s))   CBC WITH AUTOMATED DIFF    Collection Time: 04/03/22  7:58 PM   Result Value Ref Range    WBC 5.0 3.6 - 11.0 K/uL    RBC 4.16 3.80 - 5.20 M/uL    HGB 12.6 11.5 - 16.0 g/dL    HCT 38.5 35.0 - 47.0 %    MCV 92.5 80.0 - 99.0 FL    MCH 30.3 26.0 - 34.0 PG    MCHC 32.7 30.0 - 36.5 g/dL    RDW 12.6 11.5 - 14.5 %    PLATELET 802 400 - 450 K/uL    MPV 10.9 8.9 - 12.9 FL    NRBC 0.0 0.0  WBC    ABSOLUTE NRBC 0.00 0.00 - 0.01 K/uL    NEUTROPHILS 62 32 - 75 %    BAND NEUTROPHILS 18 (H) 0 - 6 %    LYMPHOCYTES 3 (L) 12 - 49 %    MONOCYTES 14 (H) 5 - 13 %    EOSINOPHILS 2 0 - 7 % BASOPHILS 0 0 - 1 %    OTHER CELL 1 %    IMMATURE GRANULOCYTES 0 %    ABS. NEUTROPHILS 4.0 1.8 - 8.0 K/UL    ABS. LYMPHOCYTES 0.2 (L) 0.8 - 3.5 K/UL    ABS. MONOCYTES 0.7 0.0 - 1.0 K/UL    ABS. EOSINOPHILS 0.1 0.0 - 0.4 K/UL    ABS. BASOPHILS 0.0 0.0 - 0.1 K/UL    ABS. IMM. GRANS. 0.0 K/UL    DF Manual      RBC COMMENTS Normocytic, Normochromic     METABOLIC PANEL, COMPREHENSIVE    Collection Time: 04/03/22  7:58 PM   Result Value Ref Range    Sodium 133 (L) 136 - 145 mmol/L    Potassium 3.9 3.5 - 5.1 mmol/L    Chloride 94 (L) 97 - 108 mmol/L    CO2 35 (H) 21 - 32 mmol/L    Anion gap 4 (L) 5 - 15 mmol/L    Glucose 150 (H) 65 - 100 mg/dL    BUN 27 (H) 6 - 20 mg/dL    Creatinine 0.82 0.55 - 1.02 mg/dL    BUN/Creatinine ratio 33 (H) 12 - 20      GFR est AA >60 >60 ml/min/1.73m2    GFR est non-AA >60 >60 ml/min/1.73m2    Calcium 8.8 8.5 - 10.1 mg/dL    Bilirubin, total 1.1 (H) 0.2 - 1.0 mg/dL    AST (SGOT) 8 (L) 15 - 37 U/L    ALT (SGPT) 27 12 - 78 U/L    Alk. phosphatase 89 45 - 117 U/L    Protein, total 5.9 (L) 6.4 - 8.2 g/dL    Albumin 2.5 (L) 3.5 - 5.0 g/dL    Globulin 3.4 2.0 - 4.0 g/dL    A-G Ratio 0.7 (L) 1.1 - 2.2     TROPONIN-HIGH SENSITIVITY    Collection Time: 04/03/22  7:58 PM   Result Value Ref Range    Troponin-High Sensitivity 15 0 - 51 ng/L   SARS-COV-2    Collection Time: 04/03/22  9:30 PM   Result Value Ref Range    SARS-CoV-2 by PCR Please find results under separate order         Radiologic Studies :     CXR Results  (Last 48 hours)               04/03/22 2006  XR CHEST SNGL V Final result    Impression:  Multifocal airspace disease suspicious for pneumonia. Narrative:  XR CHEST SNGL V       Comparison:  1/19/2022. Single view:  Multifocal airspace disease has developed with involvement of the   right perihilar lung, right lateral lung base, and left lateral midlung. Pneumonia suspected. No pneumothorax or pleural effusion apparent.  The heart   size is normal. ORIF of the right humerus partially visualized. Surgical clips   project over the left lateral chest.                   Assessment and Plan :     Multifocal pneumonia bilateral: Highly suspicious for pneumonia clinical presentation and findings. Patient is immunosuppressed with chemotherapy for her multiple myeloma. So we will treat with broad-spectrum antibiotics like cefepime to include pseudomonal coverage. We will continue Zithromax as a second line medication. Multiple myeloma with pathological fracture right distal humerus s/p surgery and radiation, stable    Benign essential hypertension: Now blood pressure is soft and low, will hold off the medications    History of diabetes mellitus: Well-controlled per patient, taking Metformin once a day. Which we will continue    Admitted to cardiac telemetry, full CODE STATUS, home medications reviewed. She has advanced medical directives on chart. CC : Luisa Griffith MD  Signed By: Archana Grissom MD     April 3, 2022      This dictation was done by dragon, computer voice recognition software. Often unanticipated grammatical, syntax, [place] phones and other interpretive errors are inadvertently transcribed. Please excuse errors that have escaped final proofreading.

## 2022-04-04 NOTE — PROGRESS NOTES
Nutrition Assessment     Type and Reason for Visit: Initial,Positive nutrition screen (MST 5)    Nutrition Recommendations/Plan:   Modify to Easy to Chew  Monitor and Record wts, po intake & BMs in I/Os    Nutrition Assessment:  Admitted w/ Multifocal pneumonia. Reported recent wt. loss and decreased in appetite in MST, however per patient, she has a good appetite, and obtain ABW w/ bed sale @ 208lb, UBW @ 203lb. Observed B tray @ ~30%, patient that she has missing teeth needs softer texture foods. Will Modify diet to easy to chew for better meal acceptance. Labs: Na (135), Glucose (161), BUN (25), Pro (4.9), alb (2.0), AST (8), A1C (5.7). Malnutrition Assessment:  Malnutrition Status: No malnutrition     Estimated Daily Nutrient Needs:  Energy (kcal):  1880kcals (20kcal/kg)  Protein (g):  75g (.8g/kg)       Fluid (ml/day):  1880ml    Nutrition Related Findings:  NPFE completed w/ no acute finding. No reported n/v/c/d nor edema. No hx of dysphagia. Observed missing upper teeth. BM pta. Current Nutrition Therapies:  ADULT DIET Regular; 3 carb choices (45 gm/meal); Low Fat/Low Chol/High Fiber/REINA; No Salt Added (3-4 gm); Low Potassium (Less than 3000 mg/day);  Low Phosphorus (Less than 1000 mg); 60 to 80 gm    Anthropometric Measures:  · Height:  5' 1\" (154.9 cm)  · Current Body Wt:  94.3 kg (208 lb)  · BMI: 39.3    Nutrition Diagnosis:   · Inadequate oral intake related to biting/chewing (masticatory) difficulty as evidenced by intake 26-50%      Nutrition Intervention:  Food and/or Nutrient Delivery: Modify current diet  Nutrition Education and Counseling: Education not indicated  Coordination of Nutrition Care: Continue to monitor while inpatient    Goals:  >75% of EENs x 7days, ltyes wnl, skin interigty       Nutrition Monitoring and Evaluation:   Behavioral-Environmental Outcomes: None identified  Food/Nutrient Intake Outcomes: Food and nutrient intake  Physical Signs/Symptoms Outcomes: Chewing or swallowing,Meal time behavior,Weight    Discharge Planning:    Continue current diet     Electronically signed by Summer Ge on 4/4/2022 at 12:54 PM    Contact Number: [phone]

## 2022-04-04 NOTE — ED NOTES
Dr Itzel Stacy at bedside, requesting that this RN assess pedal pulses with doppler as pt's bilateral feet are cold to touch. This RN obtained doppler, to assess now.

## 2022-04-04 NOTE — PROGRESS NOTES
SPEECH LANGUAGE PATHOLOGY BEDSIDE SWALLOW EVALUATIONS  Patient: Linh Schmitt  (50 y.o. )  Date: 4/4/2022  Primary Diagnosis: pneumonia   Precautions:  fall    ASSESSMENT :  Patient is A&Ox4 and follows basic commands. Hoarse vocal quality per patient x2 days. Patient reports vomiting x2days. She reports odynophagia and xerostomia. Oropharyngeal phase of swallow is wfl. No overt s/s of pen/asp observed. Patient will benefit from skilled intervention to address the above impairments. Patients rehabilitation potential is considered to be Fair     PLAN :  Recommendations and Planned Interventions:  Rec regular/thin diet w/ strict asp/GERD precautions. Rec RD consult for nutritional support. Rec saliva substitute and magic mouthwash. If concerns for aspiration persist rec MBS to r/o silent aspiration. Frequency/Duration: Patient will be followed by speech-language pathology 1 time a week to address goals. Discharge Recommendations: To Be Determined     SUBJECTIVE:   Patient reports vomiting prior to admission, odynophagia and bitter taste. OBJECTIVE:     Past Medical History:   Diagnosis Date    Breast cancer ([facility] [address] 2002    Colon polyps 10/1/2010    Diverticulosis 10/1/2010    GERD (gastroesophageal reflux disease)     Hypertension     Multiple myeloma without remission ([facility])     Unspecified sleep apnea        CXR Results  (Last 48 hours)                 04/03/22 2006  XR CHEST SNGL V Final result    Impression:  Multifocal airspace disease suspicious for pneumonia. Narrative:  XR CHEST SNGL V       Comparison:  1/19/2022. Single view:  Multifocal airspace disease has developed with involvement of the   right perihilar lung, right lateral lung base, and left lateral midlung. Pneumonia suspected. No pneumothorax or pleural effusion apparent. The heart   size is normal. ORIF of the right humerus partially visualized.  Surgical clips   project over the left lateral chest. Diet prior to admission: Regular  Current Diet:  DIET ADULT     Cognitive and Communication Status:  Neurologic State: Alert  Orientation Level: Oriented X4  Cognition: Follows commands,Poor safety awareness  Perception: Appears intact  Perseveration: No perseveration noted  Safety/Judgement: Decreased insight into deficits  Swallowing Evaluation:   Oral Assessment:  Oral Assessment  Labial: No impairment  Dentition: Intact  Oral Hygiene: xerostomia  Lingual: No impairment  Velum: No impairment  Mandible: No impairment  P.O. Trials:  Patient Position: upright in bed  Vocal quality prior to P.O.: Hoarse  Consistency Presented: Puree; Solid; Thin liquid  How Presented: Self-fed/presented;SLP-fed/presented;Cup/sip;Spoon;Straw;Successive swallows     Bolus Acceptance: No impairment  Bolus Formation/Control: No impairment     Propulsion: No impairment  Oral Residue: None  Initiation of Swallow: No impairment  Laryngeal Elevation: Functional  Aspiration Signs/Symptoms: None  Pharyngeal Phase Characteristics: No impairment, issues, or problems              Oral Phase Severity: Minimal  Pharyngeal Phase Severity : Minimal  Voice:  Vocal Quality: Hoarse             Pain:  Pain Scale 1: Numeric (0 - 10)  Pain Intensity 1: 0         After treatment:   Patient left in no apparent distress in bed, Call bell within reach, Nursing notified, and Bed / chair alarm activated    COMMUNICATION/EDUCATION:   Patient was educated regarding s/s aspiration, aspiration precautions, swallow strategies, and ST POC. She demonstrated Fair understanding as evidenced by reduced engagement. The patient's plan of care including recommendations, planned interventions, and recommended diet changes were discussed with: Registered nurse and Physician. Patient/family have participated as able in goal setting and plan of care. Patient/family agree to work toward stated goals and plan of care.     Thank you for this referral.  Beatriz Luciano Nelsy MEDINA., CCC-SLP  Time Calculation: 16 mins

## 2022-04-04 NOTE — ED PROVIDER NOTES
EMERGENCY DEPARTMENT HISTORY AND PHYSICAL EXAM      Date: 4/3/2022  Patient Name: Alex Sosa      History of Presenting Illness     Chief Complaint   Patient presents with    Shortness of Breath       History Provided By: Patient    HPI: Alex Sosa, 78 y.o. female with a past medical history significant For plasma cell cancer presents to the ED with cc of shortness of breath that began a few days ago. She says is worse with activity relieved at rest.  She has not treated with anything. She denies any fever, chills, nausea, vomiting, chest pain, rash, diarrhea, headache, night sweats, weight loss. As are mild to moderate without relief at this point. There are no other complaints, changes, or physical findings at this time. PCP: Phu Gorman MD    Current Facility-Administered Medications   Medication Dose Route Frequency Provider Last Rate Last Admin    cefTRIAXone (ROCEPHIN) 1 g in sterile water (preservative free) 10 mL IV syringe  1 g IntraVENous NOW Steve Duran MD        [facility]) tablet 1,000 mg  1,000 mg Oral NOW Steve Duran MD         Current Outpatient Medications   Medication Sig Dispense Refill    linaCLOtide (Linzess) 72 mcg cap capsule Take 1 Capsule by mouth Daily (before breakfast). 30 Capsule 1    ondansetron hcl (Zofran) 4 mg tablet Take 1 Tablet by mouth every eight (8) hours as needed for Nausea or Vomiting. 30 Tablet 1    diclofenac EC (VOLTAREN) 75 mg EC tablet Take 1 Tab by mouth two (2) times a day. 20 Tab 0    ondansetron (ZOFRAN ODT) 4 mg disintegrating tablet Take 1 Tab by mouth every eight (8) hours as needed for Nausea. 20 Tab 0    multivitamin (ONE A DAY) tablet Take 1 tablet by mouth daily.  omeprazole (PRILOSEC) 20 mg capsule TAKE ONE CAPSULE BY MOUTH DAILY 30 Cap 0    KLOR-CON M20 20 mEq tablet TAKE ONE TABLET BY MOUTH EVERY DAY 30 Tab 4    rosuvastatin (CRESTOR) 10 mg tablet Take 1 Tab by mouth daily.  30 Tab 4    lisinopril (PRINIVIL, ZESTRIL) 10 mg tablet Take 1 Tab by mouth daily. 30 Tab 1    polyethylene glycol (MIRALAX) 17 gram/dose powder Take 17 g by mouth two (2) times a day. 510 g 3    acetaminophen (TYLENOL EXTRA STRENGTH) 500 mg tablet Take  by mouth every six (6) hours as needed.  butalbital-acetaminophen-caffeine (FIORICET, ESGIC) -40 mg per tablet Take 1 Tab by mouth every six (6) hours as needed for Pain. 30 Tab 0    aspirin (ASPIRIN) 325 mg tablet Take 325 mg by mouth daily. Past History     Past Medical History:  Past Medical History:   Diagnosis Date    Cancer [facility])     had breast cancer     Colon polyps 10/1/2010    Diverticulosis 10/1/2010    GERD (gastroesophageal reflux disease)     Hypertension     Unspecified sleep apnea        Past Surgical History:  Past Surgical History:   Procedure Laterality Date    COLONOSCOPY N/A 2019    COLONOSCOPY performed by Joaquin Grajeda MD at [facility] ENDOSCOPY    Helen Tomlinson  2015         Helen Tomlinson  2019         ENDOSCOPY, COLON, DIAGNOSTIC      polyps removed    HX BREAST LUMPECTOMY      left breast - chemo,surgery, radiation (partial mastectomy)    HX CHOLECYSTECTOMY      HX HYSTERECTOMY      TAHBSO due to ?cancer - MCV    TX COLSC FLX W/RMVL OF TUMOR POLYP LESION SNARE TQ  3/8/2012            Family History:  Family History   Problem Relation Age of Onset    Cancer Mother         bone    Hypertension Mother     Diabetes Sister     Cancer Sister         pancreatic?  Diabetes Brother     Hypertension Father     Cancer Other        Social History:  Social History     Tobacco Use    Smoking status: Former Smoker     Packs/day: 0.00     Years: 20.00     Pack years: 0.00     Quit date: 10/1/2000     Years since quittin.5    Smokeless tobacco: Never Used   Substance Use Topics    Alcohol use: No    Drug use: No       Allergies:   Allergies   Allergen Reactions    Codeine Nausea and Vomiting    Contrast Agent [Iodine] Hives and Itching    Hydrocodone Nausea and Vomiting    Morphine Other (comments)     hallucinations          Review of Systems     Review of Systems   Constitutional: Negative. Negative for appetite change, chills, fatigue and fever. HENT: Negative. Negative for congestion and sinus pain. Eyes: Negative. Negative for pain and visual disturbance. Respiratory: Positive for shortness of breath. Negative for chest tightness. Cardiovascular: Negative. Negative for chest pain. Gastrointestinal: Negative. Negative for abdominal pain, diarrhea, nausea and vomiting. Genitourinary: Negative. Negative for difficulty urinating. No discharge   Musculoskeletal: Negative. Negative for arthralgias. Skin: Negative. Negative for rash. Neurological: Negative. Negative for weakness and headaches. Hematological: Negative. Psychiatric/Behavioral: Negative. Negative for agitation. The patient is not nervous/anxious. All other systems reviewed and are negative. Physical Exam     Physical Exam  Vitals and nursing note reviewed. Constitutional:       General: She is not in acute distress. Appearance: She is well-developed. HENT:      Head: Normocephalic and atraumatic. Nose: Nose normal.      Mouth/Throat:      Mouth: Mucous membranes are moist.      Pharynx: Oropharynx is clear. No oropharyngeal exudate. Eyes:      General:         Right eye: No discharge. Left eye: No discharge. Conjunctiva/sclera: Conjunctivae normal.      Pupils: Pupils are equal, round, and reactive to light. Cardiovascular:      Rate and Rhythm: Normal rate and regular rhythm. Chest Wall: PMI is not displaced. No thrill. Heart sounds: Normal heart sounds. No murmur heard. No friction rub. No gallop. Pulmonary:      Effort: Pulmonary effort is normal. No respiratory distress. Breath sounds: Normal breath sounds.  No wheezing or rales. Chest:      Chest wall: No tenderness. Abdominal:      General: Bowel sounds are normal. There is no distension. Palpations: Abdomen is soft. There is no mass. Tenderness: There is no abdominal tenderness. There is no guarding or rebound. Musculoskeletal:         General: Normal range of motion. Cervical back: Normal range of motion and neck supple. Lymphadenopathy:      Cervical: No cervical adenopathy. Skin:     General: Skin is warm and dry. Capillary Refill: Capillary refill takes less than 2 seconds. Findings: No erythema or rash. Neurological:      Mental Status: She is alert and oriented to person, place, and time. Cranial Nerves: No cranial nerve deficit. Coordination: Coordination normal.   Psychiatric:         Mood and Affect: Mood normal.         Behavior: Behavior normal.         Lab and Diagnostic Study Results     Labs -     Recent Results (from the past 12 hour(s))   CBC WITH AUTOMATED DIFF    Collection Time: 04/03/22  7:58 PM   Result Value Ref Range    WBC 5.0 3.6 - 11.0 K/uL    RBC 4.16 3.80 - 5.20 M/uL    HGB 12.6 11.5 - 16.0 g/dL    HCT 38.5 35.0 - 47.0 %    MCV 92.5 80.0 - 99.0 FL    MCH 30.3 26.0 - 34.0 PG    MCHC 32.7 30.0 - 36.5 g/dL    RDW 12.6 11.5 - 14.5 %    PLATELET 479 364 - 913 K/uL    MPV 10.9 8.9 - 12.9 FL    NRBC 0.0 0.0  WBC    ABSOLUTE NRBC 0.00 0.00 - 0.01 K/uL    NEUTROPHILS 62 32 - 75 %    BAND NEUTROPHILS 18 (H) 0 - 6 %    LYMPHOCYTES 3 (L) 12 - 49 %    MONOCYTES 14 (H) 5 - 13 %    EOSINOPHILS 2 0 - 7 %    BASOPHILS 0 0 - 1 %    OTHER CELL 1 %    IMMATURE GRANULOCYTES 0 %    ABS. NEUTROPHILS 4.0 1.8 - 8.0 K/UL    ABS. LYMPHOCYTES 0.2 (L) 0.8 - 3.5 K/UL    ABS. MONOCYTES 0.7 0.0 - 1.0 K/UL    ABS. EOSINOPHILS 0.1 0.0 - 0.4 K/UL    ABS. BASOPHILS 0.0 0.0 - 0.1 K/UL    ABS. IMM.  GRANS. 0.0 K/UL    DF Manual      RBC COMMENTS Normocytic, Normochromic     METABOLIC PANEL, COMPREHENSIVE    Collection Time: 04/03/22  7:58 PM   Result Value Ref Range    Sodium 133 (L) 136 - 145 mmol/L    Potassium 3.9 3.5 - 5.1 mmol/L    Chloride 94 (L) 97 - 108 mmol/L    CO2 35 (H) 21 - 32 mmol/L    Anion gap 4 (L) 5 - 15 mmol/L    Glucose 150 (H) 65 - 100 mg/dL    BUN 27 (H) 6 - 20 mg/dL    Creatinine 0.82 0.55 - 1.02 mg/dL    BUN/Creatinine ratio 33 (H) 12 - 20      GFR est AA >60 >60 ml/min/1.73m2    GFR est non-AA >60 >60 ml/min/1.73m2    Calcium 8.8 8.5 - 10.1 mg/dL    Bilirubin, total 1.1 (H) 0.2 - 1.0 mg/dL    AST (SGOT) 8 (L) 15 - 37 U/L    ALT (SGPT) 27 12 - 78 U/L    Alk. phosphatase 89 45 - 117 U/L    Protein, total 5.9 (L) 6.4 - 8.2 g/dL    Albumin 2.5 (L) 3.5 - 5.0 g/dL    Globulin 3.4 2.0 - 4.0 g/dL    A-G Ratio 0.7 (L) 1.1 - 2.2     TROPONIN-HIGH SENSITIVITY    Collection Time: 04/03/22  7:58 PM   Result Value Ref Range    Troponin-High Sensitivity 15 0 - 51 ng/L   SARS-COV-2    Collection Time: 04/03/22  9:30 PM   Result Value Ref Range    SARS-CoV-2 by PCR Please find results under separate order         Radiologic Studies -   [unfilled]  CT Results  (Last 48 hours)    None        CXR Results  (Last 48 hours)               04/03/22 2006  XR CHEST SNGL V Final result    Impression:  Multifocal airspace disease suspicious for pneumonia. Narrative:  XR CHEST SNGL V       Comparison:  1/19/2022. Single view:  Multifocal airspace disease has developed with involvement of the   right perihilar lung, right lateral lung base, and left lateral midlung. Pneumonia suspected. No pneumothorax or pleural effusion apparent. The heart   size is normal. ORIF of the right humerus partially visualized. Surgical clips   project over the left lateral chest.                 Medical Decision Making and ED Course   - I am the first and primary provider for this patient AND AM THE PRIMARY PROVIDER OF RECORD.     - I reviewed the vital signs, available nursing notes, past medical history, past surgical history, family history and social history. - Initial assessment performed. The patients presenting problems have been discussed, and the staff are in agreement with the care plan formulated and outlined with them. I have encouraged them to ask questions as they arise throughout their visit. Vital Signs-Reviewed the patient's vital signs. Patient Vitals for the past 12 hrs:   Temp Pulse Resp BP SpO2   04/03/22 2128  69 17 (!) 142/63 96 %   04/03/22 1951  74 22 (!) 150/81 98 %   04/03/22 1933 98.8 °F (37.1 °C) 79 18 123/68 99 %       EKG interpretation: (Preliminary): Performed at [address] [place] [address], and read at 1942  Ventricular rate 75 bpm,  ms, QRS duration 90 ms, QTC 4 to 53 ms. Interpretation: Normal sinus rhythm with normal EKG. Records Reviewed: Nursing Notes    The patient presents with shortness of breath with a differential diagnosis of ulnar edema, Covid, pneumonia, ACS, arrhythmia. Will assess with basic cardiac labs EKG chest x-ray. Will treat with albuterol and Atrovent reevaluate    ED Course:       ED Course as of 04/03/22 2246   Sun Apr 03, 2022   2224 Is getting breathing treatments and will be reevaluated after oxygen was turned off [CS]      ED Course User Index  [CS] Raymon Deshpande MD         Provider Notes (Medical Decision Making):   79-year-old female with new onset hypoxia with apparent pneumonia. Will treat as community-acquired pneumonia. Covid test is pending. No evidence of edema  MDM           Consultations:       Consultations: -  Hospitalist Consultant: Dr. Joie Holder: We have asked for emergent assistance with regard to this patient. We have discussed the patients HPI, ROS, PE and results this far. They will come and evaluate the patient for admission. Procedures and Critical Care       Performed by: Bhavin Hawkins MD  PROCEDURES:  Procedures       Disposition     Disposition: Condition stable    Admitted    Diagnosis     Clinical Impression:   1.  Community acquired pneumonia, unspecified laterality        Attestations:    Shadi Luois MD    Please note that this dictation was completed with [employer], the computer voice recognition software. Quite often unanticipated grammatical, syntax, homophones, and other interpretive errors are inadvertently transcribed by the computer software. Please disregard these errors. Please excuse any errors that have escaped final proofreading. Thank you.

## 2022-04-04 NOTE — PROGRESS NOTES
PULMONARY CONSULT  [facility]    Name: Hunter Noonan MRN: [id number]   : 1945 Hospital: [address] [place] [address]   Date: 2022  Admission date: 4/3/2022 Hospital Day: 2       HPI:     Hospital Problems  Date Reviewed: 4/3/2022          Codes Class Noted POA    Multifocal pneumonia ICD-10-CM: J18.9  ICD-9-CM: 486  4/3/2022 Yes        Pneumonia ICD-10-CM: J18.9  ICD-9-CM: 892  4/3/2022 Unknown                   [x] High complexity decision making was performed  [x] See my orders for details      Subjective/Initial History:     I was asked by Richie Barron MD to see Hunter Noonan  a 78 y.o.  female in consultation     Excerpts from admission 4/3/2022 or consult notes as follows:     Hunter Noonan is 78year old female with past medical history of hypertension, pulmonary nodules, carcinoma breast, recently history of right distal humeral pathological fracture, multiple myeloma presented with shortness of breath. Patient have cough and fever on admission. Chest x-ray with multifocal pneumonia bilateral. Patient is on cefepime and azithromycin. Patient is former smoker but stopped more than 30 years ago. Breathing has improved since admission. Patient is now off oxygen. Allergies   Allergen Reactions    Contrast Agent [Iodine] Hives and Itching    Codeine Nausea and Vomiting    Hydrocodone Nausea and Vomiting    Morphine Other (comments)     hallucinations         MAR reviewed and pertinent medications noted or modified as needed     Current Facility-Administered Medications   Medication    acyclovir (ZOVIRAX) capsule 200 mg    aspirin tablet 325 mg    atenoloL (TENORMIN) tablet 25 mg    FLUoxetine (PROzac) capsule 20 mg    linaCLOtide (LINZESS) capsule 145 mcg    multivitamin with folic acid (ONE DAILY WITH FOLIC ACID) tablet 1 Tablet    pantoprazole (PROTONIX) tablet 20 mg    . PHARMACY TO SUBSTITUTE PER PROTOCOL (Reordered from: Revlimid 25 mg cap)    traMADoL (ULTRAM) tablet 50 mg    methylPREDNISolone (PF) (SOLU-MEDROL) injection 40 mg    sodium chloride (NS) flush 5-40 mL    sodium chloride (NS) flush 5-40 mL    azithromycin (ZITHROMAX) 500 mg in 0.9% sodium chloride 250 mL (VIAL-MATE)    acetaminophen (TYLENOL) tablet 650 mg    albuterol (PROVENTIL HFA, VENTOLIN HFA, PROAIR HFA) inhaler 2 Puff    enoxaparin (LOVENOX) injection 40 mg    insulin lispro (HUMALOG) injection    glucose chewable tablet 16 g    glucagon (GLUCAGEN) injection 1 mg    dextrose 10% infusion 0-250 mL    cefepime (MAXIPIME) 1 g in sterile water (preservative free) 10 mL IV syringe    0.9% sodium chloride infusion      Patient PCP: Julio Ramírez MD  PMH:  has a past medical history of [facility]), Colon polyps (10/1/2010), Diverticulosis (10/1/2010), GERD (gastroesophageal reflux disease), Hypertension, Multiple myeloma without remission ([facility] and Unspecified sleep apnea. PSH:   has a past surgical history that includes hx cholecystectomy; endoscopy, colon, diagnostic; hx breast lumpectomy (2000); hx hysterectomy (2000); pr colsc flx w/rmvl of tumor polyp lesion snare tq (3/8/2012); colonoscopy,remv lesn,snare (1/22/2015); colonoscopy,remv lesn,snare (11/12/2019); and colonoscopy (N/A, 11/12/2019). FHX: family history includes Cancer in her mother, sister, and another family member; Diabetes in her brother and sister; Hypertension in her father and mother. SHX:  reports that she quit smoking about 21 years ago. She smoked 0.00 packs per day for 20.00 years. She has never used smokeless tobacco. She reports that she does not drink alcohol and does not use drugs. ROS:    Review of Systems   Constitutional: Positive for malaise/fatigue. HENT: Negative. Eyes: Negative. Respiratory: Positive for cough, shortness of breath and wheezing. Cardiovascular: Negative. Gastrointestinal: Negative. Genitourinary: Negative.     Musculoskeletal: Negative. Skin: Negative. Neurological: Negative. Psychiatric/Behavioral: Negative. Objective:     Vital Signs: Telemetry:    AFIB Intake/Output:   Visit Vitals  /73 (BP 1 Location: Left upper arm, BP Patient Position: At rest;Semi fowlers)   Pulse 72   Temp 97.6 °F (36.4 °C)   Resp 17   Ht 5' 1\" (1.549 m)   Wt 92.1 kg (203 lb)   SpO2 98%   BMI 38.36 kg/m²       Temp (24hrs), Av.2 °F (36.8 °C), Min:97.6 °F (36.4 °C), Max:98.8 °F (37.1 °C)        O2 Device: Nasal cannula O2 Flow Rate (L/min): 4 l/min       Wt Readings from Last 4 Encounters:   22 92.1 kg (203 lb)   22 94.8 kg (209 lb)   22 113.4 kg (250 lb)   19 101.8 kg (224 lb 8 oz)          Intake/Output Summary (Last 24 hours) at 2022 1035  Last data filed at 2022 0501  Gross per 24 hour   Intake    Output 100 ml   Net -100 ml       Last shift:      No intake/output data recorded. Last 3 shifts:  1901 -  0700  In: -   Out: 100 [Urine:100]       Physical Exam:     Physical Exam  Constitutional:       Appearance: She is obese. HENT:      Head: Normocephalic and atraumatic. Nose: Nose normal.      Mouth/Throat:      Mouth: Mucous membranes are moist.   Eyes:      Pupils: Pupils are equal, round, and reactive to light. Cardiovascular:      Rate and Rhythm: Normal rate and regular rhythm. Pulses: Normal pulses. Heart sounds: Normal heart sounds. Pulmonary:      Breath sounds: Normal breath sounds. Abdominal:      General: Abdomen is flat. Bowel sounds are normal.      Palpations: Abdomen is soft. Musculoskeletal:         General: Swelling present. Normal range of motion. Cervical back: Normal range of motion and neck supple. Neurological:      General: No focal deficit present. Mental Status: She is alert.    Psychiatric:         Mood and Affect: Mood normal.          Labs:    Recent Labs     22  0554 22   WBC 3.1* 5.0   HGB 10.7* 12.6    225     Recent Labs     04/04/22  0554 04/03/22 1958   * 133*   K 4.3 3.9   CL 97 94*   CO2 33* 35*   * 150*   BUN 25* 27*   CREA 0.53* 0.82   CA 8.0* 8.8   ALB 2.0* 2.5*   ALT 22 27     No results for input(s): PH, PCO2, PO2, HCO3, FIO2 in the last 72 hours. No results for input(s): CPK, CKNDX, TROIQ in the last 72 hours. No lab exists for component: CPKMB  No results found for: BNPP, BNP   Lab Results   Component Value Date/Time    Culture result: Escherichia coli (A) 01/20/2022 08:30 PM     Lab Results   Component Value Date/Time    TSH 1.860 04/16/2013 03:38 PM       Imaging:    CXR Results  (Last 48 hours)               04/03/22 2006  XR CHEST SNGL V Final result    Impression:  Multifocal airspace disease suspicious for pneumonia. Narrative:  XR CHEST SNGL V       Comparison:  1/19/2022. Single view:  Multifocal airspace disease has developed with involvement of the   right perihilar lung, right lateral lung base, and left lateral midlung. Pneumonia suspected. No pneumothorax or pleural effusion apparent. The heart   size is normal. ORIF of the right humerus partially visualized. Surgical clips   project over the left lateral chest.               Results from Hospital Encounter encounter on 04/03/22    XR CHEST SNGL V    Narrative  XR CHEST SNGL V    Comparison:  1/19/2022. Single view:  Multifocal airspace disease has developed with involvement of the  right perihilar lung, right lateral lung base, and left lateral midlung. Pneumonia suspected. No pneumothorax or pleural effusion apparent. The heart  size is normal. ORIF of the right humerus partially visualized. Surgical clips  project over the left lateral chest.    Impression  Multifocal airspace disease suspicious for pneumonia.       Results from [place] encounter on 03/04/22    XR SHOULDER LT AP/LAT MIN 2 V    Narrative  Exam: Left shoulder series    Indications: Deformity    Comparison: Elbow today    Number of views: 4    Findings: The humerus is internally rotated in all views. Thus, cannot exclude  the possibility of a posterior glenohumeral dislocation. If there is clinical  concern for such a phenomenon, axillary view or true AP Joyceann Cram) view  recommended. No recent fracture, malalignment, or otherwise significant bone  finding. No evident arthropathy. Soft tissues unremarkable. Impression  The humerus is internally rotated in all views. Thus, cannot exclude  the possibility of a posterior glenohumeral dislocation. If there is clinical  concern for such a phenomenon, axillary view or true AP Joyceann Cram) view  recommended. If these views are not possible, CT recommended. XR ELBOW LT MIN 3 V    Narrative  Exam: Left elbow    Indications: Deformity    Comparison: Forearm today    Number of views: 4    Findings: The exam is limited by suboptimal positioning. No recent fracture,  malalignment, or otherwise significant bone finding. No evident arthropathy. There is a probable elbow effusion. Impression  No evident recent bone lesion, though an apparent effusion suggests  the possibility of an occult fracture. Results from Hospital Encounter encounter on 03/04/22    CT UP EXT LT WO CONT    Narrative  PROCEDURE: CT UP EXT LT WO CONT    HISTORY:Elbow and shoulder pain    COMPARISON:Radiographs of the region done for March 2022    Department policy stipulates all CT scans at this facility are performed using  dose reduction optimization techniques as appropriate to the performed exam,  including the following: Automated exposure control, adjustments of the mA  and/or KVP according to the patient size, and the use of iterative  reconstruction technique. Axial images were obtained through the shoulder, humerus and elbow with  multiplanar reconstructions. No IV contrast.    Bones show no fracture or destructive lesion. The glenohumeral joint appears normally articulated.  No excess fluid in the  joint. The elbow joint appears normally articulated. No excess fluid in the joint. No focal soft tissue abnormalities. No discrete abnormal fluid collections appreciated. Impression  No acute abnormality demonstrated        IMPRESSION:   1. Bilateral Multifocal pneumonia   2. History of multiple myeloma with pathologic fracture of right distal humerus   3. History of carcinoma breast  4. Paroxsymal atrial fibulation  5. History of diabetes mellitus   Body mass index is 38.36 kg/m². RECOMMENDATIONS/PLAN:     1. Patient is 78year old female with past medical history of hypertension, pulmonary nodules, carcinoma breast, recently history of right distal humeral pathological fracture, multiple myeloma presented with possible multifocal pneumonia. Patient is on cefepime and azithromycin. Patient is former smoker but stop more than 30 years ago. 2. Albuterol as needed  3. Agree with Empiric IV antibiotics   4. High risk for aspiration  5. Because of wheezing she is on IV Solu-Medrol will start nebulizer treatment as needed and also Symbicort inhaler  6. Monitor oxygen saturation   7. DVT, SUP prophylaxis  8. She needs PFTs as an outpatient       This care involved high complexity medical decision making: I personally:  · Reviewed the flowsheet and previous days notes  · Reviewed and summarized records or history from previous days note or discussions with staff, family  · High Risk Drug therapy requiring intensive monitoring for toxicity: eg steroids, pressors, antibiotics  · Reviewed and/or ordered Clinical lab tests  · Reviewed images and/or ordered Radiology tests  · Reviewed the patients ECG / Telemetry  · Reviewed and/or adjusted NiPPV settings  · Called and arranged for Radiologic procedures or interventions  · performed or ordered Diagnostic endoscopies with identified risk factors.   · discussed my assessment/management with : Nursing, Hospitalist and Family for coordination of care          Marily Roque MD

## 2022-04-04 NOTE — PROGRESS NOTES
Hospitalist Progress Note    NAME: Frieda Staley   :  1945   MRN:  [id number]           Subjective:     Chief Complaint / Reason for Physician Visit  Patient seen and evaluated at bedside, currently still complaining of shortness of breath, slightly improved from yesterday, discussed with RN. Discussed with RN events overnight. Review of Systems:  Symptom Y/N Comments  Symptom Y/N Comments   Fever/Chills N   Chest Pain N    Poor Appetite Y   Edema N    Cough Y   Abdominal Pain N    Sputum Y   Joint Pain N    SOB/PEREZ Y   Pruritis/Rash N    Nausea/vomit N   Tolerating PT/OT NA    Diarrhea N   Tolerating Diet Y    Constipation N   Other       Could NOT obtain due to:      Objective:     VITALS:   Last 24hrs VS reviewed since prior progress note. Most recent are:  Patient Vitals for the past 24 hrs:   Temp Pulse Resp BP SpO2   22 0818 97.6 °F (36.4 °C) 72 17 133/73 98 %   22 0010 98.2 °F (36.8 °C) 74 18 (!) 140/70 93 %   228  69 17 (!) 142/63 96 %   22 1951  74 22 (!) 150/81 98 %   22 1933 98.8 °F (37.1 °C) 79 18 123/68 99 %       Intake/Output Summary (Last 24 hours) at 2022 0902  Last data filed at 2022 0501  Gross per 24 hour   Intake    Output 100 ml   Net -100 ml        PHYSICAL EXAM:  General: Patient appears comfortable  EENT:  EOMI. Anicteric sclerae. MMM  Resp:  Decreased air entry bilaterally with bilateral bibasilar crackles  CV:  Regular  rhythm, S1 plus S2, no murmurs rubs or gallops  No edema  GI:  Soft, Non distended, Non tender. +Bowel sounds  Neurologic:  Alert and oriented X 3, normal speech,   Psych:   Good insight. Not anxious nor agitated  Skin:  No rashes. No jaundice    Procedures: see electronic medical records for all procedures/Xrays and details which were not copied into this note but were reviewed prior to creation of Plan. LABS:  I reviewed today's most current labs and imaging studies.   Pertinent labs include:  Recent Labs 04/04/22  0554 04/03/22 1958   WBC 3.1* 5.0   HGB 10.7* 12.6   HCT 32.7* 38.5    225     Recent Labs     04/04/22  0554 04/03/22 1958   * 133*   K 4.3 3.9   CL 97 94*   CO2 33* 35*   * 150*   BUN 25* 27*   CREA 0.53* 0.82   CA 8.0* 8.8   ALB 2.0* 2.5*   TBILI 0.7 1.1*   ALT 22 27       Signed: She Davenport MD    X-ray chest:IMPRESSION  Multifocal airspace disease suspicious for pneumonia. Reviewed most current lab test results and cultures  YES  Reviewed most current radiology test results   YES  Review and summation of old records today    NO  Reviewed patient's current orders and MAR    YES  PMH/ reviewed - no change compared to H&P      Assessment / Plan:  Acute respiratory failure with hypoxia secondary to bilateral pneumoniaof note patient presents with above med symptomatology found to have acute respiratory failure with hypoxia requiring 4 L nasal cannula for ventilatory support based on patient's clinical presentation secondary to multifocal pneumonia  Patient is COVID-19 negative  Obtain blood cultures  Obtain sputum cultures  Methylprednisone 40 mg IV every 8  Cefepime and azithromycin for broad-spectrum antibiotic coverage  Attempt to wean oxygen  Continue to monitor respiratory status  Obtain pulmonology consult further evaluation    Hypertensioncontinue current antihypertensive medications    Type 2 diabetesinsulin sliding scale    Gastroesophageal reflux diseasecontinue medications    ProphylaxisLovenox  FENcardiac diet, replete potassium and magnesium  Dispositionpending clinical improvement    30.0 - 39.9 Obese / Body mass index is 38.36 kg/m².     Code status: Full  Prophylaxis: Lovenox  Recommended Disposition:  PT, OT, RN     ________________________________________________________________________  Care Plan discussed with:    Comments   Patient x    Family      RN x    Care Manager x    Consultant  x                     x Multidiciplinary team rounds were held today with , nursing, pharmacist and clinical coordinator. Patient's plan of care was discussed; medications were reviewed and discharge planning was addressed.      ________________________________________________________________________  Total NON critical care TIME:  35   Minutes        Comments   >50% of visit spent in counseling and coordination of care x    ________________________________________________________________________  Alfredo Christian MD

## 2022-04-05 LAB
ANION GAP SERPL CALC-SCNC: 3 MMOL/L (ref 5–15)
BUN SERPL-MCNC: 25 MG/DL (ref 6–20)
BUN/CREAT SERPL: 46 (ref 12–20)
CA-I BLD-MCNC: 7.9 MG/DL (ref 8.5–10.1)
CHLORIDE SERPL-SCNC: 100 MMOL/L (ref 97–108)
CO2 SERPL-SCNC: 33 MMOL/L (ref 21–32)
CREAT SERPL-MCNC: 0.54 MG/DL (ref 0.55–1.02)
ERYTHROCYTE [DISTWIDTH] IN BLOOD BY AUTOMATED COUNT: 12.5 % (ref 11.5–14.5)
GLUCOSE BLD STRIP.AUTO-MCNC: 141 MG/DL (ref 65–117)
GLUCOSE BLD STRIP.AUTO-MCNC: 145 MG/DL (ref 65–117)
GLUCOSE BLD STRIP.AUTO-MCNC: 169 MG/DL (ref 65–117)
GLUCOSE BLD STRIP.AUTO-MCNC: 182 MG/DL (ref 65–117)
GLUCOSE SERPL-MCNC: 156 MG/DL (ref 65–100)
HCT VFR BLD AUTO: 35 % (ref 35–47)
HGB BLD-MCNC: 11.2 G/DL (ref 11.5–16)
MAGNESIUM SERPL-MCNC: 2.8 MG/DL (ref 1.6–2.4)
MCH RBC QN AUTO: 30.1 PG (ref 26–34)
MCHC RBC AUTO-ENTMCNC: 32 G/DL (ref 30–36.5)
MCV RBC AUTO: 94.1 FL (ref 80–99)
NRBC # BLD: 0 K/UL (ref 0–0.01)
NRBC BLD-RTO: 0 PER 100 WBC
PERFORMED BY, TECHID: ABNORMAL
PLATELET # BLD AUTO: 209 K/UL (ref 150–400)
PMV BLD AUTO: 10.7 FL (ref 8.9–12.9)
POTASSIUM SERPL-SCNC: 4.4 MMOL/L (ref 3.5–5.1)
RBC # BLD AUTO: 3.72 M/UL (ref 3.8–5.2)
SODIUM SERPL-SCNC: 136 MMOL/L (ref 136–145)
WBC # BLD AUTO: 3.5 K/UL (ref 3.6–11)

## 2022-04-05 PROCEDURE — 77010033678 HC OXYGEN DAILY

## 2022-04-05 PROCEDURE — 74011250637 HC RX REV CODE- 250/637: Performed by: HOSPITALIST

## 2022-04-05 PROCEDURE — 74011250637 HC RX REV CODE- 250/637: Performed by: INTERNAL MEDICINE

## 2022-04-05 PROCEDURE — 94640 AIRWAY INHALATION TREATMENT: CPT

## 2022-04-05 PROCEDURE — 80048 BASIC METABOLIC PNL TOTAL CA: CPT

## 2022-04-05 PROCEDURE — 74011250636 HC RX REV CODE- 250/636: Performed by: INTERNAL MEDICINE

## 2022-04-05 PROCEDURE — 74011000250 HC RX REV CODE- 250: Performed by: HOSPITALIST

## 2022-04-05 PROCEDURE — 82962 GLUCOSE BLOOD TEST: CPT

## 2022-04-05 PROCEDURE — 92526 ORAL FUNCTION THERAPY: CPT

## 2022-04-05 PROCEDURE — 97161 PT EVAL LOW COMPLEX 20 MIN: CPT

## 2022-04-05 PROCEDURE — 74011250636 HC RX REV CODE- 250/636: Performed by: HOSPITALIST

## 2022-04-05 PROCEDURE — 94761 N-INVAS EAR/PLS OXIMETRY MLT: CPT

## 2022-04-05 PROCEDURE — 83735 ASSAY OF MAGNESIUM: CPT

## 2022-04-05 PROCEDURE — 85027 COMPLETE CBC AUTOMATED: CPT

## 2022-04-05 PROCEDURE — 97530 THERAPEUTIC ACTIVITIES: CPT

## 2022-04-05 PROCEDURE — 36415 COLL VENOUS BLD VENIPUNCTURE: CPT

## 2022-04-05 PROCEDURE — 65270000029 HC RM PRIVATE

## 2022-04-05 PROCEDURE — 74011636637 HC RX REV CODE- 636/637: Performed by: HOSPITALIST

## 2022-04-05 RX ORDER — GLYCOPYRROLATE 1 MG/1
1 TABLET ORAL 3 TIMES DAILY
Status: DISCONTINUED | OUTPATIENT
Start: 2022-04-05 | End: 2022-04-06 | Stop reason: HOSPADM

## 2022-04-05 RX ORDER — ADHESIVE BANDAGE
30 BANDAGE TOPICAL DAILY PRN
Status: DISCONTINUED | OUTPATIENT
Start: 2022-04-05 | End: 2022-04-06 | Stop reason: HOSPADM

## 2022-04-05 RX ORDER — PANTOPRAZOLE SODIUM 40 MG/1
40 TABLET, DELAYED RELEASE ORAL DAILY
Status: DISCONTINUED | OUTPATIENT
Start: 2022-04-06 | End: 2022-04-06 | Stop reason: HOSPADM

## 2022-04-05 RX ADMIN — ENOXAPARIN SODIUM 40 MG: 40 INJECTION SUBCUTANEOUS at 05:31

## 2022-04-05 RX ADMIN — DIPHENHYDRAMINE HYDROCHLORIDE AND LIDOCAINE HYDROCHLORIDE AND ALUMINUM HYDROXIDE AND MAGNESIUM HYDRO 10 ML: KIT at 15:15

## 2022-04-05 RX ADMIN — ERGOCALCIFEROL 50000 UNITS: 1.25 CAPSULE ORAL at 07:01

## 2022-04-05 RX ADMIN — AZITHROMYCIN 500 MG: 500 INJECTION, POWDER, LYOPHILIZED, FOR SOLUTION INTRAVENOUS at 22:04

## 2022-04-05 RX ADMIN — BUDESONIDE AND FORMOTEROL FUMARATE DIHYDRATE 2 PUFF: 160; 4.5 AEROSOL RESPIRATORY (INHALATION) at 07:12

## 2022-04-05 RX ADMIN — SODIUM CHLORIDE, PRESERVATIVE FREE 10 ML: 5 INJECTION INTRAVENOUS at 21:44

## 2022-04-05 RX ADMIN — MAGNESIUM HYDROXIDE 30 ML: 400 SUSPENSION ORAL at 08:39

## 2022-04-05 RX ADMIN — METHYLPREDNISOLONE SODIUM SUCCINATE 40 MG: 40 INJECTION, POWDER, FOR SOLUTION INTRAMUSCULAR; INTRAVENOUS at 05:31

## 2022-04-05 RX ADMIN — SODIUM CHLORIDE, PRESERVATIVE FREE 10 ML: 5 INJECTION INTRAVENOUS at 15:16

## 2022-04-05 RX ADMIN — PANTOPRAZOLE SODIUM 20 MG: 20 TABLET, DELAYED RELEASE ORAL at 08:01

## 2022-04-05 RX ADMIN — FLUOXETINE 20 MG: 20 CAPSULE ORAL at 08:01

## 2022-04-05 RX ADMIN — GLYCOPYRROLATE 1 MG: 1 TABLET ORAL at 16:35

## 2022-04-05 RX ADMIN — SODIUM CHLORIDE, PRESERVATIVE FREE 10 ML: 5 INJECTION INTRAVENOUS at 05:35

## 2022-04-05 RX ADMIN — ACYCLOVIR 200 MG: 200 CAPSULE ORAL at 10:35

## 2022-04-05 RX ADMIN — GLYCOPYRROLATE 1 MG: 1 TABLET ORAL at 22:00

## 2022-04-05 RX ADMIN — ASPIRIN 325 MG ORAL TABLET 325 MG: 325 PILL ORAL at 08:00

## 2022-04-05 RX ADMIN — INSULIN LISPRO 3 UNITS: 100 INJECTION, SOLUTION INTRAVENOUS; SUBCUTANEOUS at 12:31

## 2022-04-05 RX ADMIN — METHYLPREDNISOLONE SODIUM SUCCINATE 40 MG: 40 INJECTION, POWDER, FOR SOLUTION INTRAMUSCULAR; INTRAVENOUS at 22:01

## 2022-04-05 RX ADMIN — INSULIN LISPRO 3 UNITS: 100 INJECTION, SOLUTION INTRAVENOUS; SUBCUTANEOUS at 16:49

## 2022-04-05 RX ADMIN — MULTIVITAMIN TABLET 1 TABLET: TABLET at 08:00

## 2022-04-05 RX ADMIN — ATENOLOL 25 MG: 50 TABLET ORAL at 08:01

## 2022-04-05 RX ADMIN — ACYCLOVIR 200 MG: 200 CAPSULE ORAL at 22:00

## 2022-04-05 RX ADMIN — CEFEPIME HYDROCHLORIDE 1 G: 1 INJECTION, POWDER, FOR SOLUTION INTRAMUSCULAR; INTRAVENOUS at 10:36

## 2022-04-05 RX ADMIN — BUDESONIDE AND FORMOTEROL FUMARATE DIHYDRATE 2 PUFF: 160; 4.5 AEROSOL RESPIRATORY (INHALATION) at 19:26

## 2022-04-05 RX ADMIN — CEFEPIME HYDROCHLORIDE 1 G: 1 INJECTION, POWDER, FOR SOLUTION INTRAMUSCULAR; INTRAVENOUS at 22:01

## 2022-04-05 NOTE — PROGRESS NOTES
Clinical chart reviewed by CM. Patient's discharge plan is to return home with home health services provided by [facility]. CM team will continue to follow.

## 2022-04-05 NOTE — PROGRESS NOTES
SPEECH LANGUAGE PATHOLOGY DYSPHAGIA TREATMENT  Patient: Bella Gonzalez (8 y.o. female)  Date: 4/5/2022  Diagnosis: Pneumonia [J18.9] <principal problem not specified>       Precautions:      ASSESSMENT :  Patient is A&Ox4 and follows basic commands. Improved vocal quality. MD ordered magic mouthwash and saliva substitute. PO trials w/ thin via straw and hard solids. Oropharyngeal phase of swallow is wfl. No overt s/s of pen/asp observed. PLAN :  Recommendations and Planned Interventions:  Rec cont regular/thin w/ strict asp/GERD precautions. If concerns for aspiration persist rec MBS to r/o silent aspiration. No further ST intervention at this time. Please re-consult as medically indicated. SUBJECTIVE:   Patient reports she only coughs when she is laying down. OBJECTIVE:     Past Medical History:   Diagnosis Date    Breast cancer ([facility] [address] 2002    Colon polyps 10/1/2010    Diverticulosis 10/1/2010    GERD (gastroesophageal reflux disease)     Hypertension     Multiple myeloma without remission (HCC)     Unspecified sleep apnea        CXR Results  (Last 48 hours)                 04/03/22 2006  XR CHEST SNGL V Final result    Impression:  Multifocal airspace disease suspicious for pneumonia. Narrative:  XR CHEST SNGL V       Comparison:  1/19/2022. Single view:  Multifocal airspace disease has developed with involvement of the   right perihilar lung, right lateral lung base, and left lateral midlung. Pneumonia suspected. No pneumothorax or pleural effusion apparent. The heart   size is normal. ORIF of the right humerus partially visualized.  Surgical clips   project over the left lateral chest.                   Current Diet:  DIET ADULT  DIET ADULT ORAL NUTRITION SUPPLEMENT     Cognitive and Communication Status:  Neurologic State: Alert  Orientation Level: Oriented X4  Cognition: Follows commands  Perception: Appears intact  Perseveration: No perseveration noted  Safety/Judgement: Decreased insight into deficits         Pain:  Pain Scale 1: Numeric (0 - 10)  Pain Intensity 1: 0       After treatment:   Patient left in no apparent distress sitting up in chair, Call bell within reach, Nursing notified, and Bed / chair alarm activated    COMMUNICATION/EDUCATION:   Patient was educated regarding s/s aspiration, aspiration precautions, swallow strategies, and ST POC. She demonstrated Fair understanding as evidenced by reduced engagement. Problem: Dysphagia (Adult)  Goal: *Acute Goals and Plan of Care (Insert Text)  Description: Speech Therapy Swallow Goals  Initiated 4/4/2022  -Patient will tolerate regular/ diet with thin liquids without clinical indicators of aspiration given minimal cues within 7 day(s). -Patient will participate in modified barium swallow study within 7 day(s). -Patient will demonstrate understanding of swallow safety precautions and aspiration precautions, diet recs with minimal cues within 7 day(s).            4/5/2022 1512 by Cyn Mathew  Outcome: Resolved/Met     Thank you for this referral.  Roman Sky M.S., M.Ed., CCC-SLP  Time Calculation: 16 mins

## 2022-04-05 NOTE — CONSULTS
Hematology and Oncology Inpatient Consult Note     Patient: Hunter Noonan MRN: [id number]  SSN: [id number]    YOB: 1945  Age: 78 y.o. Sex: female    Chief Complaint: Patient was admitted with increasing shortness of breath and cough    Reason for consult: Evaluation and management of patient with multiple myeloma on chemotherapy    Subjective:      Hunter Noonan is a 78 y. o. white female who was diagnosed with multiple myeloma few weeks ago. She presented with pathological fracture of humerus and pathology showed myeloma. Subsequently she had bone marrow biopsy and was diagnosed with systemic multiple myeloma. Patient was started on chemotherapy with Revlimid, dexamethasone and Velcade. Patient had last chemotherapy about 2 weeks ago. She was taking Revlimid until 2 days ago. Patient came with increasing shortness of breath and cough. She was evaluated in the ER and was diagnosed with pneumonia and now she is on antibiotics. Patient had been feeling extremely tired. Last week she had some upper respiratory track infection symptoms and also was found to have urinary tract infection and was treated with amoxicillin. Patient did not have any fever or chills. Past Medical History:    1) Breast cancer which was diagnosed in  and patient had surgery by Dr. Ney Vasquez at [facility] and had radiation  therapy and chemotherapy. We do not have the further details. 2) Colonic polyps. 3) Diverticulosis. 4) GERD. Linda Oroscocyndi :  ([id number]) Page 2 of 4  5) Hypertension. 6) Sleep apnea. 7) Pathological fracture of right humerus in 2022. Myeloma: Patient had pathological humerus fracture in 2022, and she had surgery by Dr. Kip Roque and subsequently had radiation  therapy which she completed in 2022. Humerus bone lesion pathology showed multiple myeloma.  Subsequently, further  workup in 2022 shows creatinine of 0.8, calcium of 9.7, serum protein immunofixation showed IgA monoclonal  protein with lambda light chain with IgA level of 798. Patient's free kappa to lambda light chain ratio was very low at 0.01 with free  lambda light chain of 1131.4. Patient's urine immunofixation shows Bence Coelho protein positive. Patient's beta2 microglobulin was 2.6  and LDH was 203 with sed rate of 65. Patient had PET/CT scan on 9th of February 2022 which showed hypermetabolic bone  lesions compatible with myelomatous deposits. Patient had expansile posterior left 8th rib soft tissue mass measuring 3 x 6.7 cm,  SUV of 7.4, unknown expansile of lytic lesion in the right aspect of the manubrium, SUV of 4.4, and not healed fracture of the left posterior  9th rib with SUV of 6.1. Patient had bone marrow biopsy done on 16th of February 2022, which shows normal cellular bone  marrow with 20% to 30% clonal plasma cells compatible with plasma cell myeloma. This plasma cells in core biopsy were 20 to 30  percent with lambda restriction. No amyloid identified. Plasma cell FISH analysis shows normal. Patient's CBC on March 2nd shows WBC  3000, hemoglobin 11.4, hematocrit 34.1, platelet count of 202,517. Based on this information, patient has multiple myeloma R-ISS stage I  myeloma.    Past Surgical History:   Procedure Laterality Date    COLONOSCOPY N/A 11/12/2019    COLONOSCOPY performed [facility] Chelsea Fierro MD at [facility] ENDOSCOPY    Errol Endow  1/22/2015         Errol Endow  11/12/2019         ENDOSCOPY, COLON, DIAGNOSTIC      polyps removed    HX BREAST LUMPECTOMY  2000    left breast - chemo,surgery, radiation (partial mastectomy)    HX CHOLECYSTECTOMY      HX HYSTERECTOMY  2000    TAHBSO due to ?cancer - MCV    [place] COLSC FLX W/RMVL OF TUMOR POLYP LESION SNARE TQ  3/8/2012           Family History   Problem Relation Age of Onset    Cancer Mother         bone    Hypertension Mother     Diabetes Sister     Cancer Sister pancreatic?     Diabetes Brother     Hypertension Father     Cancer Other      Social History     Tobacco Use    Smoking status: Former Smoker     Packs/day: 0.00     Years: 20.00     Pack years: 0.00     Quit date: 10/1/2000     Years since quittin.5    Smokeless tobacco: Never Used   Substance Use Topics    Alcohol use: No      Current Facility-Administered Medications   Medication Dose Route Frequency Provider Last Rate Last Admin    magnesium hydroxide (MILK OF MAGNESIA) 400 mg/5 mL oral suspension 30 mL  30 mL Oral DAILY PRN Robinson Radford MD   30 mL at 22 0839    [START ON 2022] pantoprazole (PROTONIX) tablet 40 mg  40 mg Oral DAILY Robinson Radford MD        methylPREDNISolone (PF) (SOLU-MEDROL) injection 40 mg  40 mg IntraVENous Q12H Christiano Aguilar MD        acyclovir (ZOVIRAX) capsule 200 mg  200 mg Oral BID José Miguel Mcdonald MD   200 mg at 22 2225    aspirin tablet 325 mg  325 mg Oral DAILY José Miguel Mcdonald MD   325 mg at 22 0800    atenoloL (TENORMIN) tablet 25 mg  25 mg Oral DAILY José Migeul Mcdonald MD   25 mg at 22 0801    FLUoxetine (PROzac) capsule 20 mg  20 mg Oral DAILY José Miguel Mcdonald MD   20 mg at 22 0801    linaCLOtide (LINZESS) capsule 145 mcg  145 mcg Oral EVERY OTHER DAY José Miguel Mcdonald MD   145 mcg at 22 1037    multivitamin with folic acid (ONE DAILY WITH FOLIC ACID) tablet 1 Tablet  1 Tablet Oral DAILY José Miguel Mcdonald MD   1 Tablet at 22 0800    traMADoL (ULTRAM) tablet 50 mg  50 mg Oral Q6H PRN José Miguel Mcdonald MD        albuterol-ipratropium (DUO-NEB) 2.5 MG-0.5 MG/3 ML  3 mL Nebulization Q6H PRN Christiano Aguilar MD        budesonide-formoteroL (SYMBICORT) 160-4.5 mcg/actuation HFA inhaler 2 Puff  2 Puff Inhalation BID RT Nir Aguilar MD   2 Puff at 22 0712    ergocalciferol capsule 50,000 Units  50,000 Units Oral Q7D Adrian Pathak MD   50,000 Units at 04/05/22 0701    sodium chloride (NS) flush 5-40 mL  5-40 mL IntraVENous Q8H Bj Hernandez MD   10 mL at 04/05/22 0535    sodium chloride (NS) flush 5-40 mL  5-40 mL IntraVENous PRN Bj Hernandez MD        azithromycin (ZITHROMAX) 500 mg in 0.9% sodium chloride 250 mL (VIAL-MATE)  500 mg IntraVENous Q24H Bj Hernandez  mL/hr at 04/04/22 2224 500 mg at 04/04/22 2224    acetaminophen (TYLENOL) tablet 650 mg  650 mg Oral Q4H PRN Bj Hernandez MD        albuterol (PROVENTIL HFA, VENTOLIN HFA, PROAIR HFA) inhaler 2 Puff  2 Puff Inhalation Q4H PRN Bj Hernandez MD        enoxaparin (LOVENOX) injection 40 mg  40 mg SubCUTAneous Q24H Bj Hernandez MD   40 mg at 04/05/22 0531    insulin lispro (HUMALOG) injection   SubCUTAneous AC&HS Bj Hernandez MD   3 Units at 04/04/22 1705    glucose chewable tablet 16 g  4 Tablet Oral PRN Bj Hernandez MD        glucagon (GLUCAGEN) injection 1 mg  1 mg IntraMUSCular PRN Bj Hernandez MD        dextrose 10% infusion 0-250 mL  0-250 mL IntraVENous PRN Bj Hernandez MD        cefepime (MAXIPIME) 1 g in sterile water (preservative free) 10 mL IV syringe  1 g IntraVENous Q12H Bj Hernandez MD   1 g at 04/04/22 2224        Allergies   Allergen Reactions    Contrast Agent [Iodine] Hives and Itching    Codeine Nausea and Vomiting    Hydrocodone Nausea and Vomiting    Morphine Other (comments)     hallucinations        Review of Systems:  CONSTITUTIONAL: No fever, no chills. No repeated infections. No night sweats. She has significant fatigue. HEENT: No mouth sores. No Epistaxis. Patient does have hearing impairment. No change in taste or smell sensations. CARDIOVASCULAR: No  palpitations or chest pain. She does have some leg swelling. No syncope. RESPIRATORY: Patient has dyspnea and dyspnea on exertion.   She also has some cough which is not productive. No Hemoptysis. No wheezing. No hoarseness of voice. GI: No nausea or vomiting. No diarrhea, constipation, no bright red blood per rectum. No hematemesis or melena. No weight loss. No dysphagia. : No dysuria, no hematuria. No frequency of urination. INTEGUMENTARY: No skin rash or palpable lumps or bumps. HEMATOLOGIC: No history of easy bruisability. No gingival bleeding  NEURO: No focal weakness, No paresthesia. No headache or seizures. MUSCULOSKELETAL: She does have some back pain. Objective:     Vitals:    04/04/22 2118 04/05/22 0408 04/05/22 0413 04/05/22 0732   BP:  130/75  (!) 148/76   Pulse:  76  74   Resp:  16  18   Temp:  97.1 °F (36.2 °C)  97.6 °F (36.4 °C)   SpO2: 99% 98%  98%   Weight:   93 kg (205 lb 0.4 oz)    Height:            Physical Exam:  Constitutional: Elderly white female who looks chronically ill. She is not in any acute distress or pain. Eyes: Sclerae anicteric. Conjunctivae shows pallor. ENMT: Oral mucosa is moist, no thrush, mucositis, or petechiae. Neck: No adenopathy. Hematologic/Lymphatic: Bilateral axillary regions showed no adenopathy. Respiratory: Bilateral rhonchi and decreased air entry at the bases. Cardiovascular: Normal sinus rhythm; no gallop or murmur. Abdomen: Soft, nontender, no hepatosplenomegaly. No guarding or rigidity. Bowel sounds present. Back/Spine: No spinal tenderness; no costovertebral angle tenderness. Extremities: No edema. Skin: No petechiae; no skin rash. Neurologic: Alert/oriented x 3; no focal neurological deficits.     Recent Results (from the past 24 hour(s))   CULTURE, BLOOD, PAIRED    Collection Time: 04/04/22 10:02 AM    Specimen: Blood   Result Value Ref Range    Special Requests: No Special Requests      Culture result: No growth after 17 hours     GLUCOSE, POC    Collection Time: 04/04/22 12:16 PM   Result Value Ref Range    Glucose (POC) 195 (H) 65 - 117 mg/dL    Performed by Muriel JORGE POC    Collection Time: 04/04/22  4:58 PM   Result Value Ref Range    Glucose (POC) 151 (H) 65 - 117 mg/dL    Performed by Libertad Griffin    GLUCOSE, POC    Collection Time: 04/04/22 10:16 PM   Result Value Ref Range    Glucose (POC) 150 (H) 65 - 117 mg/dL    Performed by Breann Starks    CBC W/O DIFF    Collection Time: 04/05/22  6:21 AM   Result Value Ref Range    WBC 3.5 (L) 3.6 - 11.0 K/uL    RBC 3.72 (L) 3.80 - 5.20 M/uL    HGB 11.2 (L) 11.5 - 16.0 g/dL    HCT 35.0 35.0 - 47.0 %    MCV 94.1 80.0 - 99.0 FL    MCH 30.1 26.0 - 34.0 PG    MCHC 32.0 30.0 - 36.5 g/dL    RDW 12.5 11.5 - 14.5 %    PLATELET 132 585 - 012 K/uL    MPV 10.7 8.9 - 12.9 FL    NRBC 0.0 0.0  WBC    ABSOLUTE NRBC 0.00 0.00 - 4.10 K/uL   METABOLIC PANEL, BASIC    Collection Time: 04/05/22  6:21 AM   Result Value Ref Range    Sodium 136 136 - 145 mmol/L    Potassium 4.4 3.5 - 5.1 mmol/L    Chloride 100 97 - 108 mmol/L    CO2 33 (H) 21 - 32 mmol/L    Anion gap 3 (L) 5 - 15 mmol/L    Glucose 156 (H) 65 - 100 mg/dL    BUN 25 (H) 6 - 20 mg/dL    Creatinine 0.54 (L) 0.55 - 1.02 mg/dL    BUN/Creatinine ratio 46 (H) 12 - 20      GFR est AA >60 >60 ml/min/1.73m2    GFR est non-AA >60 >60 ml/min/1.73m2    Calcium 7.9 (L) 8.5 - 10.1 mg/dL   MAGNESIUM    Collection Time: 04/05/22  6:21 AM   Result Value Ref Range    Magnesium 2.8 (H) 1.6 - 2.4 mg/dL   GLUCOSE, POC    Collection Time: 04/05/22  7:38 AM   Result Value Ref Range    Glucose (POC) 141 (H) 65 - 117 mg/dL    Performed by GREGORIO KING         XR CHEST SNGL V   Final Result   Multifocal airspace disease suspicious for pneumonia.              Assessment:     Hospital Problems  Date Reviewed: 4/3/2022          Codes Class Noted POA    Multifocal pneumonia ICD-10-CM: J18.9  ICD-9-CM: 486  4/3/2022 Yes        Pneumonia ICD-10-CM: J18.9  ICD-9-CM: 612  4/3/2022 Unknown              Assessment & Plan:   79-year-old white female who was recently diagnosed with multiple myeloma and had bony involvement with pathological fracture of humerus. Patient was started on chemotherapy with Revlimid, dexamethasone and Velcade. Now she is presented with respiratory symptoms and was found to have pneumonia. 1) pneumonia: I have reviewed her chest x-ray report as well as chest x-ray film. It is on IV antibiotics. She is also being evaluated and followed by pulmonary. Patient is immunocompromise because of her myeloma and being on chemotherapy. 2) multiple myeloma: Patient is on Revlimid, dexamethasone and Velcade chemotherapy. She did not have Velcade last week. She did take dexamethasone this week and also had Revlimid till yesterday. Now she is going to be off from her Revlimid at least until next week. I will hold off her Velcade for now until she recovers from her pneumonia. 3) mild anemia could be from her myeloma as well as her chemotherapy. 4) patient to continue acyclovir prophylaxis for herpes zoster and aspirin prophylaxis for DVT as she is doing an outpatient. This dictation was done by dragon, computer voice recognition software. Often unanticipated grammatical, syntax, phones and other interpretive errors are inadvertently transcribed. Please excuse errors that have escaped final proofreading.      Signed By: Praneeth Fry MD     April 5, 2022

## 2022-04-05 NOTE — PROGRESS NOTES
Pulmonary Progress Note    Subjective:   Daily Progress Note: 2022 9:25 AM    Patient is 78year old female with past medical history of hypertension, pulmonary nodules, carcinoma breast, recently history of right distal humeral pathological fracture, multiple myeloma presented with possible multifocal pneumonia. Patient is on cefepime and azithromycin. Patient is resting peacefully. Patient working to wean to 2 L. Review of Systems    Review of Systems   Constitutional: Positive for malaise/fatigue. HENT: Negative. Eyes: Negative. Respiratory: Positive for cough, shortness of breath and wheezing. Cardiovascular: Negative. Gastrointestinal: Negative. Genitourinary: Negative. Musculoskeletal: Negative. Skin: Negative. Neurological: Negative. Psychiatric/Behavioral: Negative. Objective:     Visit Vitals  BP (!) 148/76 (BP 1 Location: Left upper arm, BP Patient Position: Supine)   Pulse 74   Temp 97.6 °F (36.4 °C)   Resp 18   Ht 5' 1\" (1.549 m)   Wt 93 kg (205 lb 0.4 oz)   SpO2 98%   BMI 38.74 kg/m²    O2 Flow Rate (L/min): 4 l/min O2 Device: Nasal cannula    Temp (24hrs), Av.6 °F (36.4 °C), Min:97.1 °F (36.2 °C), Max:98.1 °F (36.7 °C)      No intake/output data recorded.  1901 -  0700  In: 200 [P.O.:770]  Out: 400 [Urine:400]    Physical Exam  Constitutional:       Appearance: She is obese. HENT:      Head: Normocephalic and atraumatic. Nose: Nose normal.      Mouth/Throat:      Mouth: Mucous membranes are moist.   Eyes:      Pupils: Pupils are equal, round, and reactive to light. Cardiovascular:      Rate and Rhythm: Normal rate and regular rhythm. Pulses: Normal pulses. Heart sounds: Normal heart sounds. Pulmonary:      Breath sounds: Normal breath sounds. Abdominal:      General: Abdomen is flat. Bowel sounds are normal.      Palpations: Abdomen is soft. Musculoskeletal:         General: Swelling present. Normal range of motion. Cervical back: Normal range of motion and neck supple. Neurological:      General: No focal deficit present. Mental Status: She is alert.    Psychiatric:         Mood and Affect: Mood normal.     Data Review    Recent Results (from the past 24 hour(s))   CULTURE, BLOOD, PAIRED    Collection Time: 04/04/22 10:02 AM    Specimen: Blood   Result Value Ref Range    Special Requests: No Special Requests      Culture result: No growth after 17 hours     GLUCOSE, POC    Collection Time: 04/04/22 12:16 PM   Result Value Ref Range    Glucose (POC) 195 (H) 65 - 117 mg/dL    Performed by Josephine Sole    GLUCOSE, POC    Collection Time: 04/04/22  4:58 PM   Result Value Ref Range    Glucose (POC) 151 (H) 65 - 117 mg/dL    Performed by Josephine Sole    GLUCOSE, POC    Collection Time: 04/04/22 10:16 PM   Result Value Ref Range    Glucose (POC) 150 (H) 65 - 117 mg/dL    Performed by Bhavik Gotti    CBC W/O DIFF    Collection Time: 04/05/22  6:21 AM   Result Value Ref Range    WBC 3.5 (L) 3.6 - 11.0 K/uL    RBC 3.72 (L) 3.80 - 5.20 M/uL    HGB 11.2 (L) 11.5 - 16.0 g/dL    HCT 35.0 35.0 - 47.0 %    MCV 94.1 80.0 - 99.0 FL    MCH 30.1 26.0 - 34.0 PG    MCHC 32.0 30.0 - 36.5 g/dL    RDW 12.5 11.5 - 14.5 %    PLATELET 260 385 - 230 K/uL    MPV 10.7 8.9 - 12.9 FL    NRBC 0.0 0.0  WBC    ABSOLUTE NRBC 0.00 0.00 - 3.21 K/uL   METABOLIC PANEL, BASIC    Collection Time: 04/05/22  6:21 AM   Result Value Ref Range    Sodium 136 136 - 145 mmol/L    Potassium 4.4 3.5 - 5.1 mmol/L    Chloride 100 97 - 108 mmol/L    CO2 33 (H) 21 - 32 mmol/L    Anion gap 3 (L) 5 - 15 mmol/L    Glucose 156 (H) 65 - 100 mg/dL    BUN 25 (H) 6 - 20 mg/dL    Creatinine 0.54 (L) 0.55 - 1.02 mg/dL    BUN/Creatinine ratio 46 (H) 12 - 20      GFR est AA >60 >60 ml/min/1.73m2    GFR est non-AA >60 >60 ml/min/1.73m2    Calcium 7.9 (L) 8.5 - 10.1 mg/dL   MAGNESIUM    Collection Time: 04/05/22  6:21 AM   Result Value Ref Range    Magnesium 2.8 (H) 1.6 - 2.4 mg/dL GLUCOSE, POC    Collection Time: 04/05/22  7:38 AM   Result Value Ref Range    Glucose (POC) 141 (H) 65 - 117 mg/dL    Performed by GREGORIO KING        Current Facility-Administered Medications   Medication Dose Route Frequency    magnesium hydroxide (MILK OF MAGNESIA) 400 mg/5 mL oral suspension 30 mL  30 mL Oral DAILY PRN    [START ON 4/6/2022] pantoprazole (PROTONIX) tablet 40 mg  40 mg Oral DAILY    acyclovir (ZOVIRAX) capsule 200 mg  200 mg Oral BID    aspirin tablet 325 mg  325 mg Oral DAILY    atenoloL (TENORMIN) tablet 25 mg  25 mg Oral DAILY    FLUoxetine (PROzac) capsule 20 mg  20 mg Oral DAILY    linaCLOtide (LINZESS) capsule 145 mcg  145 mcg Oral EVERY OTHER DAY    multivitamin with folic acid (ONE DAILY WITH FOLIC ACID) tablet 1 Tablet  1 Tablet Oral DAILY    traMADoL (ULTRAM) tablet 50 mg  50 mg Oral Q6H PRN    methylPREDNISolone (PF) (SOLU-MEDROL) injection 40 mg  40 mg IntraVENous Q8H    albuterol-ipratropium (DUO-NEB) 2.5 MG-0.5 MG/3 ML  3 mL Nebulization Q6H PRN    budesonide-formoteroL (SYMBICORT) 160-4.5 mcg/actuation HFA inhaler 2 Puff  2 Puff Inhalation BID RT    ergocalciferol capsule 50,000 Units  50,000 Units Oral Q7D    sodium chloride (NS) flush 5-40 mL  5-40 mL IntraVENous Q8H    sodium chloride (NS) flush 5-40 mL  5-40 mL IntraVENous PRN    azithromycin (ZITHROMAX) 500 mg in 0.9% sodium chloride 250 mL (VIAL-MATE)  500 mg IntraVENous Q24H    acetaminophen (TYLENOL) tablet 650 mg  650 mg Oral Q4H PRN    albuterol (PROVENTIL HFA, VENTOLIN HFA, PROAIR HFA) inhaler 2 Puff  2 Puff Inhalation Q4H PRN    enoxaparin (LOVENOX) injection 40 mg  40 mg SubCUTAneous Q24H    insulin lispro (HUMALOG) injection   SubCUTAneous AC&HS    glucose chewable tablet 16 g  4 Tablet Oral PRN    glucagon (GLUCAGEN) injection 1 mg  1 mg IntraMUSCular PRN    dextrose 10% infusion 0-250 mL  0-250 mL IntraVENous PRN    cefepime (MAXIPIME) 1 g in sterile water (preservative free) 10 mL IV syringe  1 g IntraVENous Q12H           IMPRESSION:   1. Acute hypoxic respiratory failure  2. Bilateral Multifocal pneumonia   3. History of multiple myeloma with pathologic fracture of right distal humerus   4. History of carcinoma breast  5. Paroxsymal atrial fibulation  6. History of diabetes mellitus   6. Body mass index is 38.36 kg/m².        RECOMMENDATIONS/PLAN:      1. Patient is 78year old female with past medical history of hypertension, pulmonary nodules, carcinoma breast, recently history of right distal humeral pathological fracture, multiple myeloma presented with possible multifocal pneumonia. Patient is on cefepime and azithromycin. Patient is former smoker but stop more than 30 years ago. 2. Albuterol as needed  3. Patient has been weaned from 4 L to 2 L nasal cannula  4. Agree with Empiric IV antibiotics   5. High risk for aspiration  6. Because of wheezing she is on IV Solu-Medrol and nebulizer treatment as needed and also Symbicort inhaler will decrease Solu-Medrol  7. Monitor oxygen saturation   8. DVT, SUP prophylaxis  9.  She needs PFTs as an outpatient

## 2022-04-05 NOTE — PROGRESS NOTES
Problem: Pressure Injury - Risk of  Goal: *Prevention of pressure injury  Description: Document Evelio Scale and appropriate interventions in the flowsheet. Outcome: Not Progressing Towards Goal  Note: Pressure Injury Interventions:  Sensory Interventions: Assess changes in LOC,Minimize linen layers    Moisture Interventions: Absorbent underpads,Internal/External urinary devices    Activity Interventions: PT/OT evaluation    Mobility Interventions: PT/OT evaluation,HOB 30 degrees or less    Nutrition Interventions: Document food/fluid/supplement intake,Offer support with meals,snacks and hydration                     Problem: Patient Education: Go to Patient Education Activity  Goal: Patient/Family Education  Outcome: Not Progressing Towards Goal     Problem: Falls - Risk of  Goal: *Absence of Falls  Description: Document Noah Fall Risk and appropriate interventions in the flowsheet.   Outcome: Not Progressing Towards Goal  Note: Fall Risk Interventions:  Mobility Interventions: Bed/chair exit alarm    Mentation Interventions: Bed/chair exit alarm    Medication Interventions: Teach patient to arise slowly,Bed/chair exit alarm,Patient to call before getting OOB    Elimination Interventions: Bed/chair exit alarm,Call light in reach,Patient to call for help with toileting needs    History of Falls Interventions: Bed/chair exit alarm         Problem: Patient Education: Go to Patient Education Activity  Goal: Patient/Family Education  Outcome: Not Progressing Towards Goal     Problem: Patient Education: Go to Patient Education Activity  Goal: Patient/Family Education  Outcome: Not Progressing Towards Goal

## 2022-04-05 NOTE — PROGRESS NOTES
PHYSICAL THERAPY EVALUATION  Patient: Levy Nash (18 y.o. female)  Date: 4/5/2022  Primary Diagnosis: Pneumonia [J18.9]        Precautions: Fall       ASSESSMENT  Pt is a 77 yo female admitted on 4/3/2022 for c/o fo SOB that began on 4/2/22 and worsened with the pt feeling very weak, tired, and even unable to speak well due to fatigue/lack of energy; and is currently being treated for multifocal pneumonia. Chest X-ray performed on 4/3/22 revealed multifocal airspace disease suspicious for pneumonia. Pt is on chemotherapy and had recently received radiation treatment for multiple myeloma that had recently been diagnosed. Pt is currently on 2L of O2 via NC.     PMH: Breast Cancer, GERD, Diverticulosis, Multiple Myeloma without remission, HTN, Sleep apnea, right distal humeral fx repair 1/2022. Pt semi-supine with NC donned at 2L of O2 upon PT arrival, agreeable to evaluation. Pt A&O x 4 with additional time. Per pt report, pt resides with daughter in a 1 story house  with 4 WANDA, bilateral handrails, pt needed assistance from daughter for ADLS/IADLS (dressing, bathing), ambulated with RW and SBA from daughter prior to admission. DME owned: [facility], [facility]. Pt denies oxygen use at home. Pt reports 2 falls in 3 months. Based on the objective data described below, the patient presents with generalized weakness, impaired functional mobility, impaired amb, impaired balance, new reliance on O2 at 2L via NC, and decreased activity tolerance. Pt required SBA with additional time for bed mobility, SBA with additional time supine <> sit, Min A  with additional time sit <> stand transfers. Pt amb 20 feet (bed>toilet>chair) with gt belt, RW, and Min A with additional time as well as verbal and tactile cues for managing RW; demonstrating slow, shuffling gt pattern with no LOB or knee buckling noted. Pt did fair with session today with additional time required for mobility tasks due to decreased activity tolerance.  Pt will benefit from continued skilled PT to address above deficits and return to PLOF. Current PT DC recommendation HHPT and continued 24/7 care. Current Level of Function Impacting Discharge (mobility/balance): Min A    Other factors to consider for discharge: PMH, severity of deficits, acute medical status, age      PLAN :  Recommendations and Planned Interventions: bed mobility training, transfer training, gait training, therapeutic exercises, neuromuscular re-education, patient and family training/education and therapeutic activities      Recommend with staff: Min A with gait belt and RW with OOB mobility    Frequency/Duration: Patient will be followed by physical therapy:  3-5x/week to address goals. Recommendation for discharge: (in order for the patient to meet his/her long term goals)  Home with [address] [place] [address]    This discharge recommendation:  Has been made in collaboration with the attending provider and/or case management    IF patient discharges home will need the following DME: patient owns DME required for discharge         SUBJECTIVE:   Patient stated I am doing okay.     OBJECTIVE DATA SUMMARY:   HISTORY:    Past Medical History:   Diagnosis Date    Breast cancer ([facility] [address] [facility] 2002    Colon polyps 10/1/2010    Diverticulosis 10/1/2010    GERD (gastroesophageal reflux disease)     Hypertension     [facility] myeloma without remission ([facility]     Unspecified sleep apnea      Past Surgical History:   Procedure Laterality Date    COLONOSCOPY N/A 11/12/2019    COLONOSCOPY performed by Chelsea Fierro MD at [address] [place] [address]  1/22/2015         COLONOSCOPYMELANIA  11/12/2019         ENDOSCOPY, COLON, DIAGNOSTIC      polyps removed    HX BREAST LUMPECTOMY  2000    left breast - chemo,surgery, radiation (partial mastectomy)    HX CHOLECYSTECTOMY      HX HYSTERECTOMY  2000    TAHBSO due to ?cancer - MCV    SD COLSC FLX W/RMVL OF TUMOR POLYP LESION SNARE TQ  3/8/2012 Home Situation  Home Environment: Private residence  # Steps to Enter: 4  Rails to Enter: Yes  Hand Rails : Bilateral  One/Two Story Residence: One story  Living Alone: No  Support Systems: Child(mikhail)  Patient Expects to be Discharged to[de-identified] Home with home health  Current DME Used/Available at Home: Walker, rolling  Tub or Shower Type: Other (comment) (Pt has 2 bathrooms: 1 tub/shower combo, 1 walk in shower)    EXAMINATION/PRESENTATION/DECISION MAKING:   Critical Behavior:  Neurologic State: Alert  Orientation Level: Oriented X4  Cognition: Follows commands  Safety/Judgement: Decreased insight into deficits  Hearing: Auditory  Auditory Impairment: Hard of hearing, bilateral  Skin:  Intact, no signs of wounds or skin breakdown where visible  Edema: Generalized edema of B LE  Range Of Motion:  AROM: Generally decreased, functional                       Strength:    Strength: Generally decreased, functional                    Tone & Sensation:       Sensation = Intact B LE. Pt did report that she has numbness or tingling in her feet         Coordination:  Coordination: Within functional limits  Vision:      Functional Mobility:  Bed Mobility:  Rolling: Stand-by assistance; Additional time  Supine to Sit: Stand-by assistance; Additional time     Scooting: Stand-by assistance;Assist x1;Additional time  Transfers:  Sit to Stand: Minimum assistance;Assist x1;Additional time  Stand to Sit: Minimum assistance;Assist x1;Additional time                       Balance:   Sitting: Intact; With support  Sitting - Static: Fair (occasional)  Sitting - Dynamic: Fair (occasional)  Standing: Impaired; With support  Standing - Static: Fair;Constant support  Standing - Dynamic : Fair;Constant support  Ambulation/Gait Training:  Distance (ft): 20 Feet (ft) (bed>toilet>recliner)     Ambulation - Level of Assistance: Minimal assistance;Assist x1;Additional time (Assist for managing RW)     Gait Description (WDL): Exceptions to [facility] Base of Support: Narrowed     Speed/Lulu: Shuffled;Slow;Pace decreased (<100 feet/min)              Therapeutic Exercises:   Not completed during this session    Functional Measure:  [facility] Mobility Inpatient Short Form  How much difficulty does the patient currently have. .. Unable A Lot A Little None   1. Turning over in bed (including adjusting bedclothes, sheets and blankets)? [] 1   [] 2   [] 3   [x] 4   2. Sitting down on and standing up from a chair with arms ( e.g., wheelchair, bedside commode, etc.)   [] 1   [] 2   [x] 3   [] 4   3. Moving from lying on back to sitting on the side of the bed? [] 1   [] 2   [] 3   [x] 4          How much help from another person does the patient currently need. .. Total A Lot A Little None   4. Moving to and from a bed to a chair (including a wheelchair)? [] 1   [] 2   [x] 3   [] 4   5. Need to walk in hospital room? [] 1   [] 2   [x] 3   [] 4   6. Climbing 3-5 steps with a railing? [] 1   [x] 2   [] 3   [] 4   © , Trustees of [address] [place] [address] [address] [id number], under license to [employer]. All rights reserved     Score:  Initial:  Most Recent: X (Date: 22 )   Interpretation of Tool:  Represents activities that are increasingly more difficult (i.e. Bed mobility, Transfers, Gait).   Score 24 23 22-20 19-15 14-10 9-7 6   Modifier CH CI CJ CK CL CM CN         Physical Therapy Evaluation Charge Determination   History Examination Presentation Decision-Making   HIGH Complexity :3+ comorbidities / personal factors will impact the outcome/ POC  HIGH Complexity : 4+ Standardized tests and measures addressing body structure, function, activity limitation and / or participation in recreation  LOW Complexity : Stable, uncomplicated  Other outcome measures ampac 6  Mod      Based on the above components, the patient evaluation is determined to be of the following complexity level: LOW     Pain Ratin/10 , Pt had c/o of chest pain prior to mobility, but during mobility tasks did not have any c/o of increase in pain throughout full session. Activity Tolerance:   Fair and requires rest breaks    After treatment patient left in no apparent distress:   Sitting in chair and Call bell within reach and nsg updated. GOALS:    Problem: Mobility Impaired (Adult and Pediatric)  Goal: *Acute Goals and Plan of Care (Insert Text)  Description: Pt will be I with LE HEP in 7 days. Pt will perform bed mobility within independent in 7 days. Pt will perform transfers with independent in 7 days. Pt will amb  feet with LRAD safely with Mod I in 7 days. Pt will ascend/descend 4 steps with B handrails and CGA in 7 days to safely enter home. Pt will demonstrate improvement in standing dynamic  balance from Min A to Stu in 7 days. Outcome: Not Met       COMMUNICATION/EDUCATION:   The patients plan of care was discussed with: Occupational therapist, Registered nurse, and Case management. Fall prevention education was provided and the patient/caregiver indicated understanding., Patient/family have participated as able in goal setting and plan of care. , and Patient/family agree to work toward stated goals and plan of care. KAYY Coleman, present during session to assist and observe with patient verbal consent under direct supervision of Manfred Richardson PT, DPT.     Thank you for this referral.  KAYY Coleman, PT, DPT   Time Calculation: 33 mins

## 2022-04-05 NOTE — PROGRESS NOTES
Progress Note    Patient: Gisela Arita MRN: [id number]  SSN: [id number]    YOB: 1945  Age: 78 y.o. Sex: female      Admit Date: 4/3/2022    LOS: 2 days     Subjective:       76F  H/o carcinoma of breast, MM complicated by right distal humeral pathological fracture undergoing radiation/ chemotherapy presented with acute hypoxic respiratory failure s/t pneumonia    HOSPITAL COURSE:  On 4L NC, on cefepime/ azithromycin. CXR showed multifocal pneumonia. Oncology consultation for possible mets to lungs        Review of Systems:            Symptom Y/N Comments   Symptom Y/N Comments   Fever/Chills N     Chest Pain N      Poor Appetite Y     Edema N      Cough Y     Abdominal Pain N      Sputum Y     Joint Pain N      SOB/PEREZ Y     Pruritis/Rash N      Nausea/vomit N     Tolerating PT/OT NA      Diarrhea N     Tolerating Diet Y      Constipation N     Other                Objective:     Vitals:    04/04/22 2118 04/05/22 0408 04/05/22 0413 04/05/22 0732   BP:  130/75  (!) 148/76   Pulse:  76  74   Resp:  16  18   Temp:  97.1 °F (36.2 °C)  97.6 °F (36.4 °C)   SpO2: 99% 98%  98%   Weight:   93 kg (205 lb 0.4 oz)    Height:            Intake and Output:  Current Shift: No intake/output data recorded. Last three shifts: 04/03 1901 - 04/05 0700  In: 200 [P.O.:770]  Out: 400 [Urine:400]         PHYSICAL EXAM:  General:          Patient appears comfortable  EENT:              EOMI. Anicteric sclerae. MMM  Resp:               Decreased air entry bilaterally with bilateral bibasilar crackles  CV:                  Regular  rhythm, S1 plus S2, no murmurs rubs or gallops  No edema  GI:                   Soft, Non distended, Non tender.  +Bowel sounds  Neurologic:       Alert and oriented X 3, normal speech,   Psych:   Good insight. Not anxious nor agitated  Skin:                No rashes.   No jaundice    Lab/Data Review:  Recent Results (from the past 24 hour(s))   CULTURE, BLOOD, PAIRED    Collection Time: 04/04/22 10:02 AM    Specimen: Blood   Result Value Ref Range    Special Requests: No Special Requests      Culture result: No growth after 17 hours     GLUCOSE, POC    Collection Time: 04/04/22 12:16 PM   Result Value Ref Range    Glucose (POC) 195 (H) 65 - 117 mg/dL    Performed by Naifmon Jason    GLUCOSE, POC    Collection Time: 04/04/22  4:58 PM   Result Value Ref Range    Glucose (POC) 151 (H) 65 - 117 mg/dL    Performed by [facility]    GLUCOSE, POC    Collection Time: 04/04/22 10:16 PM   Result Value Ref Range    Glucose (POC) 150 (H) 65 - 117 mg/dL    Performed by Jackie Jarvis    CBC W/O DIFF    Collection Time: 04/05/22  6:21 AM   Result Value Ref Range    WBC 3.5 (L) 3.6 - 11.0 K/uL    RBC 3.72 (L) 3.80 - 5.20 M/uL    HGB 11.2 (L) 11.5 - 16.0 g/dL    HCT 35.0 35.0 - 47.0 %    MCV 94.1 80.0 - 99.0 FL    MCH 30.1 26.0 - 34.0 PG    MCHC 32.0 30.0 - 36.5 g/dL    RDW 12.5 11.5 - 14.5 %    PLATELET 800 966 - 081 K/uL    MPV 10.7 8.9 - 12.9 FL    NRBC 0.0 0.0  WBC    ABSOLUTE NRBC 0.00 0.00 - 7.38 K/uL   METABOLIC PANEL, BASIC    Collection Time: 04/05/22  6:21 AM   Result Value Ref Range    Sodium 136 136 - 145 mmol/L    Potassium 4.4 3.5 - 5.1 mmol/L    Chloride 100 97 - 108 mmol/L    CO2 33 (H) 21 - 32 mmol/L    Anion gap 3 (L) 5 - 15 mmol/L    Glucose 156 (H) 65 - 100 mg/dL    BUN 25 (H) 6 - 20 mg/dL    Creatinine 0.54 (L) 0.55 - 1.02 mg/dL    BUN/Creatinine ratio 46 (H) 12 - 20      GFR est AA >60 >60 ml/min/1.73m2    GFR est non-AA >60 >60 ml/min/1.73m2    Calcium 7.9 (L) 8.5 - 10.1 mg/dL   MAGNESIUM    Collection Time: 04/05/22  6:21 AM   Result Value Ref Range    Magnesium 2.8 (H) 1.6 - 2.4 mg/dL   GLUCOSE, POC    Collection Time: 04/05/22  7:38 AM   Result Value Ref Range    Glucose (POC) 141 (H) 65 - 117 mg/dL    Performed by Fidelia Pimentel          Assessment and plan:      (1) acute hypoxic respiratory failure : on 4L NC, weaned to 2L.     (2) pneumonia: on cefepime and azithromycin- atypicals and anaerobes. Speech therapy GERD precautions. covid negative. solumedrol    (3) carcinoma of breast: consulted oncology for concern for mets    (4) multiple myeloma: complicated by pathological fractures. S/p radiation. (5) pAFIB: atenolol. Not on AC    (6) HTN:     DVT ppx: lovenox     DISPO: home if weaned to room air. Likely in 24-48 hours.  Spoke to daughter Cherry Gordillo- and informed if weaned to room air and seen by oncology she ca be discharged tomorrow    Signed By: Judith Peabody, MD     April 5, 2022

## 2022-04-05 NOTE — PROGRESS NOTES
Pulmonary Progress Note    Subjective:   Daily Progress Note: 2022 9:25 AM    Patient is 78year old female with past medical history of hypertension, pulmonary nodules, carcinoma breast, recently history of right distal humeral pathological fracture, multiple myeloma presented with possible multifocal pneumonia. Patient is on cefepime and azithromycin. Patient is resting peacefully. Patient working to wean to 2 L. Review of Systems    Review of Systems   Constitutional: Positive for malaise/fatigue. HENT: Negative. Eyes: Negative. Respiratory: Positive for cough, shortness of breath and wheezing. Cardiovascular: Negative. Gastrointestinal: Negative. Genitourinary: Negative. Musculoskeletal: Negative. Skin: Negative. Neurological: Negative. Psychiatric/Behavioral: Negative. Objective:     Visit Vitals  BP (!) 148/76 (BP 1 Location: Left upper arm, BP Patient Position: Supine)   Pulse 74   Temp 97.6 °F (36.4 °C)   Resp 18   Ht 5' 1\" (1.549 m)   Wt 205 lb 0.4 oz (93 kg)   SpO2 98%   BMI 38.74 kg/m²    O2 Flow Rate (L/min): 4 l/min O2 Device: Nasal cannula    Temp (24hrs), Av.6 °F (36.4 °C), Min:97.1 °F (36.2 °C), Max:98.1 °F (36.7 °C)      No intake/output data recorded.  1901 -  0700  In: 200 [P.O.:770]  Out: 400 [Urine:400]    Physical Exam  Constitutional:       Appearance: She is obese. HENT:      Head: Normocephalic and atraumatic. Nose: Nose normal.      Mouth/Throat:      Mouth: Mucous membranes are moist.   Eyes:      Pupils: Pupils are equal, round, and reactive to light. Cardiovascular:      Rate and Rhythm: Normal rate and regular rhythm. Pulses: Normal pulses. Heart sounds: Normal heart sounds. Pulmonary:      Breath sounds: Normal breath sounds. Abdominal:      General: Abdomen is flat. Bowel sounds are normal.      Palpations: Abdomen is soft. Musculoskeletal:         General: Swelling present. Normal range of motion. Cervical back: Normal range of motion and neck supple. Neurological:      General: No focal deficit present. Mental Status: She is alert.    Psychiatric:         Mood and Affect: Mood normal.     Data Review    Recent Results (from the past 24 hour(s))   CULTURE, BLOOD, PAIRED    Collection Time: 04/04/22 10:02 AM    Specimen: Blood   Result Value Ref Range    Special Requests: No Special Requests      Culture result: No growth after 17 hours     GLUCOSE, POC    Collection Time: 04/04/22 12:16 PM   Result Value Ref Range    Glucose (POC) 195 (H) 65 - 117 mg/dL    Performed by Tiffanie Roque    GLUCOSE, POC    Collection Time: 04/04/22  4:58 PM   Result Value Ref Range    Glucose (POC) 151 (H) 65 - 117 mg/dL    Performed by Tiffanie Roque    GLUCOSE, POC    Collection Time: 04/04/22 10:16 PM   Result Value Ref Range    Glucose (POC) 150 (H) 65 - 117 mg/dL    Performed by Eleazar Hahn    CBC W/O DIFF    Collection Time: 04/05/22  6:21 AM   Result Value Ref Range    WBC 3.5 (L) 3.6 - 11.0 K/uL    RBC 3.72 (L) 3.80 - 5.20 M/uL    HGB 11.2 (L) 11.5 - 16.0 g/dL    HCT 35.0 35.0 - 47.0 %    MCV 94.1 80.0 - 99.0 FL    MCH 30.1 26.0 - 34.0 PG    MCHC 32.0 30.0 - 36.5 g/dL    RDW 12.5 11.5 - 14.5 %    PLATELET 342 592 - 797 K/uL    MPV 10.7 8.9 - 12.9 FL    NRBC 0.0 0.0  WBC    ABSOLUTE NRBC 0.00 0.00 - 4.91 K/uL   METABOLIC PANEL, BASIC    Collection Time: 04/05/22  6:21 AM   Result Value Ref Range    Sodium 136 136 - 145 mmol/L    Potassium 4.4 3.5 - 5.1 mmol/L    Chloride 100 97 - 108 mmol/L    CO2 33 (H) 21 - 32 mmol/L    Anion gap 3 (L) 5 - 15 mmol/L    Glucose 156 (H) 65 - 100 mg/dL    BUN 25 (H) 6 - 20 mg/dL    Creatinine 0.54 (L) 0.55 - 1.02 mg/dL    BUN/Creatinine ratio 46 (H) 12 - 20      GFR est AA >60 >60 ml/min/1.73m2    GFR est non-AA >60 >60 ml/min/1.73m2    Calcium 7.9 (L) 8.5 - 10.1 mg/dL   MAGNESIUM    Collection Time: 04/05/22  6:21 AM   Result Value Ref Range    Magnesium 2.8 (H) 1.6 - 2.4 mg/dL GLUCOSE, POC    Collection Time: 04/05/22  7:38 AM   Result Value Ref Range    Glucose (POC) 141 (H) 65 - 117 mg/dL    Performed by GREGORIO KING        Current Facility-Administered Medications   Medication Dose Route Frequency    magnesium hydroxide (MILK OF MAGNESIA) 400 mg/5 mL oral suspension 30 mL  30 mL Oral DAILY PRN    [START ON 4/6/2022] pantoprazole (PROTONIX) tablet 40 mg  40 mg Oral DAILY    acyclovir (ZOVIRAX) capsule 200 mg  200 mg Oral BID    aspirin tablet 325 mg  325 mg Oral DAILY    atenoloL (TENORMIN) tablet 25 mg  25 mg Oral DAILY    FLUoxetine (PROzac) capsule 20 mg  20 mg Oral DAILY    linaCLOtide (LINZESS) capsule 145 mcg  145 mcg Oral EVERY OTHER DAY    multivitamin with folic acid (ONE DAILY WITH FOLIC ACID) tablet 1 Tablet  1 Tablet Oral DAILY    traMADoL (ULTRAM) tablet 50 mg  50 mg Oral Q6H PRN    methylPREDNISolone (PF) (SOLU-MEDROL) injection 40 mg  40 mg IntraVENous Q8H    albuterol-ipratropium (DUO-NEB) 2.5 MG-0.5 MG/3 ML  3 mL Nebulization Q6H PRN    budesonide-formoteroL (SYMBICORT) 160-4.5 mcg/actuation HFA inhaler 2 Puff  2 Puff Inhalation BID RT    ergocalciferol capsule 50,000 Units  50,000 Units Oral Q7D    sodium chloride (NS) flush 5-40 mL  5-40 mL IntraVENous Q8H    sodium chloride (NS) flush 5-40 mL  5-40 mL IntraVENous PRN    azithromycin (ZITHROMAX) 500 mg in 0.9% sodium chloride 250 mL (VIAL-MATE)  500 mg IntraVENous Q24H    acetaminophen (TYLENOL) tablet 650 mg  650 mg Oral Q4H PRN    albuterol (PROVENTIL HFA, VENTOLIN HFA, PROAIR HFA) inhaler 2 Puff  2 Puff Inhalation Q4H PRN    enoxaparin (LOVENOX) injection 40 mg  40 mg SubCUTAneous Q24H    insulin lispro (HUMALOG) injection   SubCUTAneous AC&HS    glucose chewable tablet 16 g  4 Tablet Oral PRN    glucagon (GLUCAGEN) injection 1 mg  1 mg IntraMUSCular PRN    dextrose 10% infusion 0-250 mL  0-250 mL IntraVENous PRN    cefepime (MAXIPIME) 1 g in sterile water (preservative free) 10 mL IV syringe  1 g IntraVENous Q12H           IMPRESSION:   1. Bilateral Multifocal pneumonia   2. History of multiple myeloma with pathologic fracture of right distal humerus   3. History of carcinoma breast  4. Paroxsymal atrial fibulation  5. History of diabetes mellitus   6. Body mass index is 38.36 kg/m².          RECOMMENDATIONS/PLAN:      1. Patient is 78year old female with past medical history of hypertension, pulmonary nodules, carcinoma breast, recently history of right distal humeral pathological fracture, multiple myeloma presented with possible multifocal pneumonia. Patient is on cefepime and azithromycin. Patient is former smoker but stop more than 30 years ago. 2. Albuterol as needed  3. Agree with Empiric IV antibiotics   4. High risk for aspiration  5. Because of wheezing she is on IV Solu-Medrol will start nebulizer treatment as needed and also Symbicort inhaler  6. Monitor oxygen saturation   7. DVT, SUP prophylaxis  8.  She needs PFTs as an outpatient

## 2022-04-05 NOTE — PROGRESS NOTES
Problem: Pressure Injury - Risk of  Goal: *Prevention of pressure injury  Description: Document Evelio Scale and appropriate interventions in the flowsheet. Outcome: Progressing Towards Goal  Note: Pressure Injury Interventions:  Sensory Interventions: Assess changes in LOC    Moisture Interventions: Absorbent underpads    Activity Interventions: PT/OT evaluation    Mobility Interventions: PT/OT evaluation    Nutrition Interventions: Document food/fluid/supplement intake                     Problem: Patient Education: Go to Patient Education Activity  Goal: Patient/Family Education  Outcome: Progressing Towards Goal     Problem: Falls - Risk of  Goal: *Absence of Falls  Description: Document Noah Fall Risk and appropriate interventions in the flowsheet.   Outcome: Progressing Towards Goal  Note: Fall Risk Interventions:  Mobility Interventions: Bed/chair exit alarm    Mentation Interventions: Bed/chair exit alarm    Medication Interventions: Teach patient to arise slowly,Bed/chair exit alarm,Patient to call before getting OOB    Elimination Interventions: Bed/chair exit alarm,Call light in reach,Patient to call for help with toileting needs    History of Falls Interventions: Bed/chair exit alarm         Problem: Patient Education: Go to Patient Education Activity  Goal: Patient/Family Education  Outcome: Progressing Towards Goal     Problem: Patient Education: Go to Patient Education Activity  Goal: Patient/Family Education  Outcome: Progressing Towards Goal     Problem: Patient Education: Go to Patient Education Activity  Goal: Patient/Family Education  Outcome: Progressing Towards Goal

## 2022-04-06 VITALS
DIASTOLIC BLOOD PRESSURE: 85 MMHG | TEMPERATURE: 97.3 F | RESPIRATION RATE: 18 BRPM | WEIGHT: 205.03 LBS | OXYGEN SATURATION: 92 % | HEIGHT: 61 IN | SYSTOLIC BLOOD PRESSURE: 150 MMHG | BODY MASS INDEX: 38.71 KG/M2 | HEART RATE: 71 BPM

## 2022-04-06 LAB
GLUCOSE BLD STRIP.AUTO-MCNC: 153 MG/DL (ref 65–117)
GLUCOSE BLD STRIP.AUTO-MCNC: 178 MG/DL (ref 65–117)
PERFORMED BY, TECHID: ABNORMAL
PERFORMED BY, TECHID: ABNORMAL

## 2022-04-06 PROCEDURE — 97116 GAIT TRAINING THERAPY: CPT

## 2022-04-06 PROCEDURE — 94640 AIRWAY INHALATION TREATMENT: CPT

## 2022-04-06 PROCEDURE — 74011636637 HC RX REV CODE- 636/637: Performed by: HOSPITALIST

## 2022-04-06 PROCEDURE — 74011250637 HC RX REV CODE- 250/637: Performed by: HOSPITALIST

## 2022-04-06 PROCEDURE — 82962 GLUCOSE BLOOD TEST: CPT

## 2022-04-06 PROCEDURE — 94761 N-INVAS EAR/PLS OXIMETRY MLT: CPT

## 2022-04-06 PROCEDURE — 74011250637 HC RX REV CODE- 250/637: Performed by: INTERNAL MEDICINE

## 2022-04-06 PROCEDURE — 74011250636 HC RX REV CODE- 250/636: Performed by: INTERNAL MEDICINE

## 2022-04-06 PROCEDURE — 74011250636 HC RX REV CODE- 250/636: Performed by: HOSPITALIST

## 2022-04-06 PROCEDURE — 74011000250 HC RX REV CODE- 250: Performed by: HOSPITALIST

## 2022-04-06 PROCEDURE — 97110 THERAPEUTIC EXERCISES: CPT

## 2022-04-06 PROCEDURE — 97530 THERAPEUTIC ACTIVITIES: CPT

## 2022-04-06 RX ORDER — PREDNISONE 20 MG/1
40 TABLET ORAL
Qty: 8 TABLET | Refills: 0 | Status: SHIPPED | OUTPATIENT
Start: 2022-04-06 | End: 2022-04-10

## 2022-04-06 RX ORDER — PANTOPRAZOLE SODIUM 40 MG/1
40 TABLET, DELAYED RELEASE ORAL DAILY
Qty: 7 TABLET | Refills: 0 | Status: SHIPPED | OUTPATIENT
Start: 2022-04-06 | End: 2022-04-13

## 2022-04-06 RX ORDER — AMOXICILLIN AND CLAVULANATE POTASSIUM 875; 125 MG/1; MG/1
1 TABLET, FILM COATED ORAL EVERY 12 HOURS
Qty: 14 TABLET | Refills: 0 | Status: SHIPPED | OUTPATIENT
Start: 2022-04-06 | End: 2022-04-13

## 2022-04-06 RX ADMIN — INSULIN LISPRO 3 UNITS: 100 INJECTION, SOLUTION INTRAVENOUS; SUBCUTANEOUS at 08:50

## 2022-04-06 RX ADMIN — ATENOLOL 25 MG: 50 TABLET ORAL at 08:47

## 2022-04-06 RX ADMIN — ACYCLOVIR 200 MG: 200 CAPSULE ORAL at 11:05

## 2022-04-06 RX ADMIN — CEFEPIME HYDROCHLORIDE 1 G: 1 INJECTION, POWDER, FOR SOLUTION INTRAMUSCULAR; INTRAVENOUS at 11:04

## 2022-04-06 RX ADMIN — DIPHENHYDRAMINE HYDROCHLORIDE AND LIDOCAINE HYDROCHLORIDE AND ALUMINUM HYDROXIDE AND MAGNESIUM HYDRO 10 ML: KIT at 11:08

## 2022-04-06 RX ADMIN — ASPIRIN 325 MG ORAL TABLET 325 MG: 325 PILL ORAL at 08:47

## 2022-04-06 RX ADMIN — ENOXAPARIN SODIUM 40 MG: 40 INJECTION SUBCUTANEOUS at 05:54

## 2022-04-06 RX ADMIN — BUDESONIDE AND FORMOTEROL FUMARATE DIHYDRATE 2 PUFF: 160; 4.5 AEROSOL RESPIRATORY (INHALATION) at 08:52

## 2022-04-06 RX ADMIN — GLYCOPYRROLATE 1 MG: 1 TABLET ORAL at 08:48

## 2022-04-06 RX ADMIN — FLUOXETINE 20 MG: 20 CAPSULE ORAL at 08:48

## 2022-04-06 RX ADMIN — PANTOPRAZOLE SODIUM 40 MG: 40 TABLET, DELAYED RELEASE ORAL at 08:47

## 2022-04-06 RX ADMIN — MAGNESIUM HYDROXIDE 30 ML: 400 SUSPENSION ORAL at 08:47

## 2022-04-06 RX ADMIN — MULTIVITAMIN TABLET 1 TABLET: TABLET at 08:49

## 2022-04-06 RX ADMIN — LINACLOTIDE 145 MCG: 145 CAPSULE, GELATIN COATED ORAL at 11:08

## 2022-04-06 RX ADMIN — METHYLPREDNISOLONE SODIUM SUCCINATE 40 MG: 40 INJECTION, POWDER, FOR SOLUTION INTRAMUSCULAR; INTRAVENOUS at 11:02

## 2022-04-06 RX ADMIN — SODIUM CHLORIDE, PRESERVATIVE FREE 10 ML: 5 INJECTION INTRAVENOUS at 05:54

## 2022-04-06 NOTE — PROGRESS NOTES
Patient has been accepted by [facility] health services, [facility] 04/07/2022. Patient is clear to discharge home with home health services provided by [facility] by CM. Nurse is aware. Patient's pharmacy is [facility] on [address]. Medicare pt has received, reviewed, and signed 2nd IMM letter informing them of their right to appeal the discharge. Signed copy has been placed on pt bedside chart.

## 2022-04-06 NOTE — PROGRESS NOTES
PHYSICAL THERAPY TREATMENT  Patient: Linh Schmitt (9 y.o. female)  Date: 4/6/2022  Diagnosis: Pneumonia [J18.9] <principal problem not specified>       Precautions:    Chart, physical therapy assessment, plan of care and goals were reviewed. ASSESSMENT  Patient continues with skilled PT services and is progressing towards goals. Patient supposed to be discharging today and needing to coordinate SpO2 walk test with RT. Performed bed mobility SBA, transfers CGA. Patient ambulated well, CGA approx 150 ft with no LOB during session and patient was able increases distance with 2 rest breaks and SpO2 WFL on RA, see RT note for details. Patient seated in bedside chair and completed LE therex well with no difficulty, see chart below. Patient progressing well overall with mobility, rec d/c home with HHPT and family care. Pt reports having RW at home for DME. Current Level of Function Impacting Discharge (mobility/balance): weakness, unsteadiness    Other factors to consider for discharge: safety & assistance          PLAN :  Patient continues to benefit from skilled intervention to address the above impairments. Continue treatment per established plan of care. to address goals.     Recommendation for discharge: (in order for the patient to meet his/her long term goals)  Home with [address] [place] [address]    This discharge recommendation:  Has been made in collaboration with the attending provider and/or case management    IF patient discharges home will need the following DME: rolling walker       SUBJECTIVE:   Patient stated I can understand what everyone is saying    OBJECTIVE DATA SUMMARY:   Critical Behavior:  Neurologic State: Alert  Orientation Level: Oriented X4  Cognition: Follows commands,Appropriate decision making  Safety/Judgement: Decreased insight into deficits  Functional Mobility Training:  Bed Mobility:  Rolling: Stand-by assistance  Supine to Sit: Stand-by assistance  Scooting: Stand-by assistance    Transfers:  Sit to Stand: Contact guard assistance  Stand to Sit: Contact guard assistance       Balance:  Sitting: Intact; Without support  Sitting - Static: Good (unsupported)  Sitting - Dynamic: Good (unsupported)  Standing: Impaired; With support  Standing - Static: Fair;Constant support  Standing - Dynamic : Fair;Constant support    Ambulation/Gait Training:  Distance (ft): 150 Feet (ft)  Assistive Device: Gait belt;Walker, rolling  Ambulation - Level of Assistance: Contact guard assistance;Minimal assistance  Base of Support: Narrowed  Speed/Lulu: Shuffled; Slow      Therapeutic Exercises:   10x LAQ  10x ankle pumps  15x marches    Pain Ratin/10    Activity Tolerance:   Good  Please refer to the flowsheet for vital signs taken during this treatment. After treatment patient left in no apparent distress:   Sitting in chair, Call bell within reach, and NIRMALA present for tx    COMMUNICATION/COLLABORATION:   The patients plan of care was discussed with: Occupational therapy assistant and Respiratory therapist. Partial overlap of OT, trading off sessions. Raciel Gregory   Time Calculation: 23 mins         Problem: Mobility Impaired (Adult and Pediatric)  Goal: *Acute Goals and Plan of Care (Insert Text)  Description: Pt will be I with LE HEP in 7 days. Pt will perform bed mobility within independent in 7 days. Pt will perform transfers with independent in 7 days. Pt will amb  feet with LRAD safely with Mod I in 7 days. Pt will ascend/descend 4 steps with B handrails and CGA in 7 days to safely enter home. Pt will demonstrate improvement in standing dynamic  balance from Min A to Stu in 7 days.      Outcome: Progressing Towards Goal

## 2022-04-06 NOTE — DISCHARGE INSTRUCTIONS
INSTRUCTIONS ON DISCHARGE:    (1) please take the medication as prescribed:            AUGMENTIN 875mg twice a day for 7 days           PREDNISONE 40 mg daily for 4 days and then STOP           PROTONIX 40 mg daily for 7 days- this medication is for any heart burn caused by the above medications. If your heart burn gets very severe you may stop prednisone.      (2) please follow-up with PCP and oncology and pulmonology in 1 week

## 2022-04-06 NOTE — PROGRESS NOTES
Hematology and Oncology Progress Note    Patient: Bella Gonzalez MRN: [id number]  SSN: [id number]    YOB: 1945  Age: 78 y.o. Sex: female      Admit Date: 4/3/2022    LOS: 3 days     Pt. Was discharged.

## 2022-04-06 NOTE — DISCHARGE SUMMARY
Physician Discharge Summary     Patient ID:    Celia Rogel  [id number]  73 y.o.  1945    Admit date: 4/3/2022    Discharge date : 4/6/2022    Chronic Diagnoses:    Problem List as of 4/6/2022 Date Reviewed: 4/3/2022          Codes Class Noted - Resolved    Multifocal pneumonia ICD-10-CM: J18.9  ICD-9-CM: 486  4/3/2022 - Present        Pneumonia ICD-10-CM: J18.9  ICD-9-CM: 752  4/3/2022 - Present        Pathological fracture ICD-10-CM: M84.40XA  ICD-9-CM: 733.10  1/22/2022 - Present        Right humeral fracture ICD-10-CM: C55.178B  ICD-9-CM: 812.20  1/19/2022 - Present        Hypokalemia ICD-10-CM: E87.6  ICD-9-CM: 276.8  9/19/2012 - Present        Dyslipidemia ICD-10-CM: E78.5  ICD-9-CM: 272.4  12/2/2011 - Present        Colon polyps ICD-10-CM: K63.5  ICD-9-CM: 211.3  10/1/2010 - Present    Overview Addendum 3/8/2012  8:10 AM by Juliocesar Hill MD     Colonoscopy Dr. Zackary Rodriguez 3/19/2010  Colonoscopy Dr. Nile Roman 3/8/2012--two polyps removed. Plan for repeat colonoscopy in March, 2015               Diverticulosis ICD-10-CM: K57.90  ICD-9-CM: 562.10  10/1/2010 - Present        HTN (hypertension) ICD-10-CM: I10  ICD-9-CM: 401.9  10/1/2010 - Present        GERD (gastroesophageal reflux disease) ICD-10-CM: K21.9  ICD-9-CM: 530.81  10/1/2010 - Present          22    Final Diagnoses:   Pneumonia [J18.9]    Reason for Hospitalization:  (1) acute hypoxic respiratory failure : on 4L NC, weaned to 2L. And then to room air     (2) pneumonia: on cefepime and azithromycin- atypicals and anaerobes. Speech therapy GERD precautions. covid negative. solumedrol     (3) carcinoma of breast: consulted oncology for concern for mets     (4) multiple myeloma: complicated by pathological fractures. S/p radiation.      (5) pAFIB: atenolol.  Not on AC     (6) HTN:       Hospital Course:   74F  H/o carcinoma of breast, MM complicated by right distal humeral pathological fracture undergoing radiation/ chemotherapy presented with acute hypoxic respiratory failure s/t pneumonia     HOSPITAL COURSE:  On 4L NC, on cefepime/ azithromycin. CXR showed multifocal pneumonia. Oncology consultation for possible mets to lungs. She was weaned down to the room air, oncology was consulted- no additional recommendations. She improved and was planned for discharge            INSTRUCTIONS ON DISCHARGE:    (1) please take the medication as prescribed:            AUGMENTIN 875mg twice a day for 7 days           PREDNISONE 40 mg daily for 4 days and then STOP           PROTONIX 40 mg daily for 7 days- this medication is for any heart burn caused by the above medications. If your heart burn gets very severe you may stop prednisone. (2) please follow-up with PCP and oncology and pulmonology in 1 week        Discharge Medications:   Current Discharge Medication List      START taking these medications    Details   amoxicillin-clavulanate (Augmentin) 875-125 mg per tablet Take 1 Tablet by mouth every twelve (12) hours for 7 days. Qty: 14 Tablet, Refills: 0  Start date: 4/6/2022, End date: 4/13/2022      predniSONE (DELTASONE) 20 mg tablet Take 40 mg by mouth daily (with breakfast) for 4 days. Indications: prevent kidney transplant rejection  Qty: 8 Tablet, Refills: 0  Start date: 4/6/2022, End date: 4/10/2022      pantoprazole (Protonix) 40 mg tablet Take 1 Tablet by mouth daily for 7 days. Qty: 7 Tablet, Refills: 0  Start date: 4/6/2022, End date: 4/13/2022         CONTINUE these medications which have NOT CHANGED    Details   metFORMIN ER (GLUCOPHAGE XR) 500 mg tablet Take 500 mg by mouth daily. Revlimid 25 mg cap Take 25 mg by mouth daily. acyclovir (ZOVIRAX) 200 mg capsule Take 200 mg by mouth two (2) times a day. FLUoxetine (PROzac) 20 mg capsule Take 20 mg by mouth daily. traMADoL (ULTRAM) 50 mg tablet Take 50 mg by mouth three (3) times daily as needed for Pain.       atenoloL (TENORMIN) 25 mg tablet Take 25 mg by mouth daily. prochlorperazine (COMPAZINE) 10 mg tablet Take 10 mg by mouth four (4) times daily as needed for Nausea. Linzess 145 mcg cap capsule Take 145 mcg by mouth daily. ondansetron (ZOFRAN ODT) 8 mg disintegrating tablet Take 8 mg by mouth three (3) times daily as needed for Nausea or Vomiting.      multivitamin (ONE A DAY) tablet Take 1 tablet by mouth daily. aspirin (ASPIRIN) 325 mg tablet Take 325 mg by mouth daily. STOP taking these medications       ondansetron hcl (Zofran) 4 mg tablet Comments:   Reason for Stopping:         omeprazole (PRILOSEC) 20 mg capsule Comments:   Reason for Stopping: Follow up Care:    1. Julio Ramírez MD in 1-2 weeks. Please call to set up an appointment shortly after discharge. Diet: cardiac    Disposition:  Home with HH PT OT SN diasease management     Advanced Directive:   FULL x   DNR      Discharge Exam:  General:          Patient appears comfortable  EENT:              EOMI. Anicteric sclerae. MMM  Resp:               Decreased air entry bilaterally with bilateral bibasilar crackles  CV:                  Regular  rhythm, S1 plus S2, no murmurs rubs or gallops  No edema  GI:                   Soft, Non distended, Non tender.  +Bowel sounds  Neurologic:       Alert and oriented X 3, normal speech,   Psych:   Good insight. Not anxious nor agitated  Skin:                No rashes.  No jaundice    CONSULTATIONS: oncology and pulm    Significant Diagnostic Studies:     Radiologic Studies -   Results from Hospital Encounter encounter on 04/03/22    XR CHEST SNGL V    Narrative  XR CHEST SNGL V    Comparison:  1/19/2022. Single view:  Multifocal airspace disease has developed with involvement of the  right perihilar lung, right lateral lung base, and left lateral midlung. Pneumonia suspected. No pneumothorax or pleural effusion apparent. The heart  size is normal. ORIF of the right humerus partially visualized. Surgical clips  project over the left lateral chest.    Impression  Multifocal airspace disease suspicious for pneumonia.      CT Results  (Last 48 hours)    None              Discharge time spent 35 minutes    Signed:  Wilner Jalloh MD  4/6/2022  8:09 AM .

## 2022-04-06 NOTE — PROGRESS NOTES
Patient O2 sat on RA at rest 94%, HR 84. Patient walked with walker and PT assistance for 6 minutes. Patient O2 sat on RA while walking 93%, HR 88. Patient returned to chair post walk RA 97%, HR 88.

## 2022-04-06 NOTE — PROGRESS NOTES
OCCUPATIONAL THERAPY TREATMENT  Patient: Dixie Rodriguez (64 y.o. female)  Date: 4/6/2022  Diagnosis: Pneumonia [J18.9] <principal problem not specified>       Precautions:    Chart, occupational therapy assessment, plan of care, and goals were reviewed. ASSESSMENT  Patient continues with skilled OT services and is progressing towards goals. Upon [facility] arrival, pt sitting in chair finishing with PTA and agreeable to tx session. Pt completed sit>stand from chair with CGA using RW for balance upon standing. Pt ambulated to bathroom with CGA and completed toilet transfer with CGA and increased verbal cueing. Pt completed sit>stand from toilet, declining bladder hygiene, and completed standing oral hygiene at sink with CGA/Clint for balance and sequencing. Pt noted to have few LOB while standing at sink requiring CGA/Clint for correction. Pt noted to be leaning on wall throughout activity and became fatigued within few minutes. Due to SOB and fatigue, pt returned to chair in room with CGA. Pt completed seated hair brushing and face washing with setup A. Pt left sitting in chair with call bell within reach. Will continue to follow pt throughout remainder of stay and progress towards OT goals. Recommending HHOT with 24/7 care at discharge when medically appropriate. Other factors to consider for discharge: family/social support, DME, time since onset, severity of deficits, decline from functional baseline         PLAN :  Patient continues to benefit from skilled intervention to address the above impairments. Continue treatment per established plan of care. to address goals.     Recommendation for discharge: (in order for the patient to meet his/her long term goals)  Home with [address] [place] [address] with 24/7 care    This discharge recommendation:  Has been made in collaboration with the attending provider and/or case management    IF patient discharges home will need the following DME: TBD       SUBJECTIVE: Patient stated Beltran Byers would like to get cleaned up.     OBJECTIVE DATA SUMMARY:   Cognitive/Behavioral Status:  Neurologic State: Alert  Orientation Level: Oriented X4  Cognition: Follows commands    Functional Mobility and Transfers for ADLs:  Bed Mobility:  Rolling: Stand-by assistance  Supine to Sit: Stand-by assistance  Scooting: Stand-by assistance    Transfers:  Sit to Stand: Contact guard assistance  Functional Transfers  Toilet Transfer : Contact guard assistance    Balance:  Sitting: Intact; Without support  Sitting - Static: Good (unsupported)  Sitting - Dynamic: Good (unsupported)  Standing: Impaired; With support  Standing - Static: Fair;Constant support  Standing - Dynamic : Fair;Constant support    ADL Intervention:  Grooming  Position Performed: Seated in chair;Standing  Washing Face: Set-up  Brushing Teeth: Contact guard assistance;Minimum assistance  Brushing/Combing Hair: Set-up    Toileting  Bladder Hygiene:  (pt declined)    Pain:  0/10    Activity Tolerance:   Fair, requires rest breaks, and observed SOB with activity  Please refer to the flowsheet for vital signs taken during this treatment. After treatment patient left in no apparent distress:   Sitting in chair and Call bell within reach    COMMUNICATION/COLLABORATION:   The patients plan of care was discussed with: Physical therapy assistant and Registered nurse. Partial overlap with PT for trading off sessions.     ADAM Pavon  Time Calculation: 23 mins    Problem: Self Care Deficits Care Plan (Adult)  Goal: *Acute Goals and Plan of Care (Insert Text)  Description: Pt will be Mod I sup <> sit in prep for EOB ADLs  Pt will be Mod I grooming standing sink side LRAD  Pt will be Mod I UB dressing sitting EOB/long sit   Pt will be Mod I LE dressing sitting EOB/long sit  Pt will be Mod I sit <>  prep for toileting LRAD  Pt will be Mod I toileting/toilet transfer/cloth mgmt LRAD  Pt will be IND following UE HEP in prep for self care tasks      Outcome: Progressing Towards Goal

## 2022-04-06 NOTE — PROGRESS NOTES
Pulmonary Progress Note    Subjective:   Daily Progress Note: 2022 9:25 AM    Patient is 78year old female with past medical history of hypertension, pulmonary nodules, carcinoma breast, recently history of right distal humeral pathological fracture, multiple myeloma presented with possible multifocal pneumonia. Patient is on cefepime and azithromycin. Patient is resting peacefully. Patient working to wean to 2 L. Review of Systems    Review of Systems   Constitutional: Positive for malaise/fatigue. HENT: Negative. Eyes: Negative. Respiratory: Positive for cough, shortness of breath and wheezing. Cardiovascular: Negative. Gastrointestinal: Negative. Genitourinary: Negative. Musculoskeletal: Negative. Skin: Negative. Neurological: Negative. Psychiatric/Behavioral: Negative. Objective:     Visit Vitals  /71 (BP 1 Location: Left upper arm, BP Patient Position: At rest)   Pulse 83   Temp 97.4 °F (36.3 °C)   Resp 18   Ht 5' 1\" (1.549 m)   Wt 93 kg (205 lb 0.4 oz)   SpO2 94%   BMI 38.74 kg/m²    O2 Flow Rate (L/min): 2 l/min O2 Device: None (Room air)    Temp (24hrs), Av.8 °F (36.6 °C), Min:97.4 °F (36.3 °C), Max:98.3 °F (36.8 °C)      No intake/output data recorded.  1901 -  0700  In: 48 [P.O.:50]  Out: 100 [Urine:100]    Physical Exam  Constitutional:       Appearance: She is obese. HENT:      Head: Normocephalic and atraumatic. Nose: Nose normal.      Mouth/Throat:      Mouth: Mucous membranes are moist.   Eyes:      Pupils: Pupils are equal, round, and reactive to light. Cardiovascular:      Rate and Rhythm: Normal rate and regular rhythm. Pulses: Normal pulses. Heart sounds: Normal heart sounds. Pulmonary:      Breath sounds: Normal breath sounds. Abdominal:      General: Abdomen is flat. Bowel sounds are normal.      Palpations: Abdomen is soft. Musculoskeletal:         General: Swelling present. Normal range of motion. Cervical back: Normal range of motion and neck supple. Neurological:      General: No focal deficit present. Mental Status: She is alert.    Psychiatric:         Mood and Affect: Mood normal.     Data Review    Recent Results (from the past 24 hour(s))   GLUCOSE, POC    Collection Time: 04/05/22 11:14 AM   Result Value Ref Range    Glucose (POC) 169 (H) 65 - 117 mg/dL    Performed by Mat Pond, POC    Collection Time: 04/05/22  4:32 PM   Result Value Ref Range    Glucose (POC) 182 (H) 65 - 117 mg/dL    Performed by [address] [place] [address], POC    Collection Time: 04/05/22  9:30 PM   Result Value Ref Range    Glucose (POC) 145 (H) 65 - 117 mg/dL    Performed by KEVIN FLOR    GLUCOSE, POC    Collection Time: 04/06/22  7:27 AM   Result Value Ref Range    Glucose (POC) 153 (H) 65 - 117 mg/dL    Performed by Justina Green        Current Facility-Administered Medications   Medication Dose Route Frequency    magnesium hydroxide (MILK OF MAGNESIA) 400 mg/5 mL oral suspension 30 mL  30 mL Oral DAILY PRN    pantoprazole (PROTONIX) tablet 40 mg  40 mg Oral DAILY    methylPREDNISolone (PF) (SOLU-MEDROL) injection 40 mg  40 mg IntraVENous Q12H    artificial saliva (MOUTH KOTE) 1 Spray  1 Spray Oral PRN    magic mouthwash (FIRST-MOUTHWASH BLM) oral suspension 10 mL  10 mL Oral TIDAC    glycopyrrolate (ROBINUL) tablet 1 mg  1 mg Oral TID    acyclovir (ZOVIRAX) capsule 200 mg  200 mg Oral BID    aspirin tablet 325 mg  325 mg Oral DAILY    atenoloL (TENORMIN) tablet 25 mg  25 mg Oral DAILY    FLUoxetine (PROzac) capsule 20 mg  20 mg Oral DAILY    linaCLOtide (LINZESS) capsule 145 mcg  145 mcg Oral EVERY OTHER DAY    multivitamin with folic acid (ONE DAILY WITH FOLIC ACID) tablet 1 Tablet  1 Tablet Oral DAILY    traMADoL (ULTRAM) tablet 50 mg  50 mg Oral Q6H PRN    albuterol-ipratropium (DUO-NEB) 2.5 MG-0.5 MG/3 ML  3 mL Nebulization Q6H PRN    budesonide-formoteroL (SYMBICORT) 160-4.5 mcg/actuation HFA inhaler 2 Puff  2 Puff Inhalation BID RT    ergocalciferol capsule 50,000 Units  50,000 Units Oral Q7D    sodium chloride (NS) flush 5-40 mL  5-40 mL IntraVENous Q8H    sodium chloride (NS) flush 5-40 mL  5-40 mL IntraVENous PRN    azithromycin (ZITHROMAX) 500 mg in 0.9% sodium chloride 250 mL (VIAL-MATE)  500 mg IntraVENous Q24H    acetaminophen (TYLENOL) tablet 650 mg  650 mg Oral Q4H PRN    albuterol (PROVENTIL HFA, VENTOLIN HFA, PROAIR HFA) inhaler 2 Puff  2 Puff Inhalation Q4H PRN    enoxaparin (LOVENOX) injection 40 mg  40 mg SubCUTAneous Q24H    insulin lispro (HUMALOG) injection   SubCUTAneous AC&HS    glucose chewable tablet 16 g  4 Tablet Oral PRN    glucagon (GLUCAGEN) injection 1 mg  1 mg IntraMUSCular PRN    dextrose 10% infusion 0-250 mL  0-250 mL IntraVENous PRN    cefepime (MAXIPIME) 1 g in sterile water (preservative free) 10 mL IV syringe  1 g IntraVENous Q12H           IMPRESSION:   1. Acute hypoxic respiratory failure  2. Bilateral Multifocal pneumonia   3. History of multiple myeloma with pathologic fracture of right distal humerus   4. History of carcinoma breast  5. Paroxsymal atrial fibulation  6. History of diabetes mellitus        RECOMMENDATIONS/PLAN:      1. Patient is 78year old female with past medical history of hypertension, pulmonary nodules, carcinoma breast, recently history of right distal humeral pathological fracture, multiple myeloma presented with possible multifocal pneumonia. Patient is on cefepime and azithromycin. Patient is former smoker but stop more than 30 years ago. 2. Albuterol as needed  3. Patient has been weaned off oxygen now she is on room air  4.  She needs PFTs as an outpatient

## 2022-04-11 LAB
BACTERIA SPEC CULT: NORMAL
SPECIAL REQUESTS,SREQ: NORMAL

## 2025-05-07 NOTE — ED PROVIDER NOTES
EMERGENCY DEPARTMENT HISTORY AND PHYSICAL EXAM      Date: 7/2/2019  Patient Name: Sampson Gonzales  Patient Age and Sex: 72 y.o. female    History of Presenting Illness     Chief Complaint   Patient presents with    Abdominal Pain     pt reports RLQ pain x2 weeks, vomiting off and on x2 weeks, was seen by PCP for pain and told to come to ED    Vomiting       History Provided By: Patient    HPI: Sampson Gonzales, 72 y.o. female with past medical history as documented below presents to the ED with c/o of two weeks of right flank pain, intermittent and moderate in intensity. Patient states the pain is worse with certain movements. Patient also reports associated emesis that is nonbloody and non-bilious. Due to persistence of pain. patient called her primary care doctor who advised her to come to the ER for further evaluation. Pt denies any associated urinary symptoms. She reports taking over-the-counter pain medications with minimal relief the pain. Pt denies any other alleviating or exacerbating factors. Additionally, pt specifically denies any recent fever, chills, headache, CP, SOB, lightheadedness, dizziness, numbness, weakness, BLE swelling, heart palpitations, urinary sxs, diarrhea, constipation, melena, hematochezia, cough, or congestion. PCP: None    There are no other complaints, changes or physical findings at this time.    Past History   Past Medical History:  Past Medical History:   Diagnosis Date    Cancer [facility])     had breast cancer     Colon polyps 10/1/2010    Diverticulosis 10/1/2010    GERD (gastroesophageal reflux disease)     Hypertension     Unspecified sleep apnea        Past Surgical History:  Past Surgical History:   Procedure Laterality Date   Damion Barragan  1/22/2015         ENDOSCOPY, COLON, DIAGNOSTIC      polyps removed    HX BREAST LUMPECTOMY  2000    left breast - chemo,surgery, radiation (partial mastectomy)    HX CHOLECYSTECTOMY      HX HYSTERECTOMY  2000 [facility] due to ?cancer - [facility]    DC COLSC FLX W/RMVL OF TUMOR POLYP LESION SNARE TQ  3/8/2012            Family History:  Family History   Problem Relation Age of Onset    Cancer Mother         bone    Hypertension Mother     Diabetes Sister     Cancer Sister         pancreatic?  Diabetes Brother     Hypertension Father        Social History:  Social History     Tobacco Use    Smoking status: Former Smoker     Packs/day: 0.00     Years: 20.00     Pack years: 0.00     Last attempt to quit: 10/1/2000     Years since quittin.7    Smokeless tobacco: Never Used   Substance Use Topics    Alcohol use: No    Drug use: No       Allergies: Allergies   Allergen Reactions    Codeine Nausea and Vomiting    Contrast Agent [Iodine] Hives and Itching    Hydrocodone Nausea and Vomiting    Morphine Other (comments)     hallucinations        Current Medications:  No current facility-administered medications on file prior to encounter. Current Outpatient Medications on File Prior to Encounter   Medication Sig Dispense Refill    multivitamin (ONE A DAY) tablet Take 1 tablet by mouth daily.  omeprazole (PRILOSEC) 20 mg capsule TAKE ONE CAPSULE BY MOUTH DAILY 30 Cap 0    KLOR-CON M20 20 mEq tablet TAKE ONE TABLET BY MOUTH EVERY DAY 30 Tab 4    rosuvastatin (CRESTOR) 10 mg tablet Take 1 Tab by mouth daily. 30 Tab 4    lisinopril (PRINIVIL, ZESTRIL) 10 mg tablet Take 1 Tab by mouth daily. 30 Tab 1    polyethylene glycol (MIRALAX) 17 gram/dose powder Take 17 g by mouth two (2) times a day. 510 g 3    acetaminophen (TYLENOL EXTRA STRENGTH) 500 mg tablet Take  by mouth every six (6) hours as needed.  butalbital-acetaminophen-caffeine (FIORICET, ESGIC) -40 mg per tablet Take 1 Tab by mouth every six (6) hours as needed for Pain. 30 Tab 0    aspirin (ASPIRIN) 325 mg tablet Take 325 mg by mouth daily. Review of Systems   Review of Systems   Constitutional: Negative.   Negative for chills and fever. HENT: Negative. Negative for congestion, facial swelling, rhinorrhea, sore throat, trouble swallowing and voice change. Eyes: Negative. Respiratory: Negative. Negative for apnea, cough, chest tightness, shortness of breath and wheezing. Cardiovascular: Negative. Negative for chest pain, palpitations and leg swelling. Gastrointestinal: Negative. Negative for abdominal distention, abdominal pain, blood in stool, constipation, diarrhea, nausea and vomiting. Endocrine: Negative. Negative for cold intolerance, heat intolerance and polyuria. Genitourinary: Negative. Negative for difficulty urinating, dysuria, flank pain, frequency, hematuria and urgency. Musculoskeletal: Negative. Negative for arthralgias, back pain, myalgias, neck pain and neck stiffness. Skin: Negative. Negative for color change and rash. Neurological: Negative. Negative for dizziness, syncope, facial asymmetry, speech difficulty, weakness, light-headedness, numbness and headaches. Hematological: Negative. Does not bruise/bleed easily. Psychiatric/Behavioral: Negative. Negative for confusion and self-injury. The patient is not nervous/anxious. Physical Exam   Physical Exam   Constitutional: She is oriented to person, place, and time. She appears well-developed and well-nourished. No distress. HENT:   Head: Normocephalic and atraumatic. Mouth/Throat: Oropharynx is clear and moist. No oropharyngeal exudate. Eyes: Pupils are equal, round, and reactive to light. Conjunctivae and EOM are normal.   Neck: Normal range of motion. Cardiovascular: Normal rate, regular rhythm and normal heart sounds. Exam reveals no gallop and no friction rub. No murmur heard. Pulmonary/Chest: Effort normal and breath sounds normal. No respiratory distress. She has no wheezes. She has no rales. She exhibits no tenderness. Abdominal: Soft. Bowel sounds are normal. She exhibits no distension and no mass.  There is no tenderness. There is no rebound and no guarding. Musculoskeletal: Normal range of motion. She exhibits no edema, tenderness or deformity. Neurological: She is alert and oriented to person, place, and time. She displays normal reflexes. No cranial nerve deficit. She exhibits normal muscle tone. Coordination normal.   Skin: Skin is warm. No rash noted. She is not diaphoretic. Psychiatric: She has a normal mood and affect. Nursing note and vitals reviewed. Diagnostic Study Results     Labs -  No results found for this or any previous visit (from the past 24 hour(s)). Radiologic Studies -   CT ABD PELV WO CONT   Final Result   IMPRESSION:      1. No acute findings in the abdomen or pelvis. 2. Small low-attenuation exophytic right lower pole renal lesion which is not   characterized on this study. Since this could represent a cyst, renal ultrasound   is recommended as the initial study for further evaluation. This can be   performed electively. 3. Colonic diverticulosis. CT Results  (Last 48 hours)    None        CXR Results  (Last 48 hours)    None          Medical Decision Making   I am the first provider for this patient. I reviewed the vital signs, available nursing notes, past medical history, past surgical history, family history and social history. Vital Signs-Reviewed the patient's vital signs.   Visit Vitals  /71   Pulse 78   Temp 98.8 °F (37.1 °C)   Resp 16   Ht 5' 2\" (1.575 m)   Wt 100.4 kg (221 lb 5.5 oz)   SpO2 98%   BMI 40.48 kg/m²       Pulse Oximetry Analysis - 98% on RA    Cardiac Monitor:   Rate: 78 bpm  Rhythm: Normal Sinus Rhythm      Records Reviewed: Nursing Notes, Old Medical Records, Previous electrocardiograms, Previous Radiology Studies and Previous Laboratory Studies    Provider Notes (Medical Decision Making):   Pt presents with acute abdominal pain; vital signs stable with currently a non-peritoneal exam; DDx includes: Gastroenteritis, hepatitis, pancreatitis, obstruction, appendicitis, viral illness, IBD, diverticulitis, mesenteric ischemia, AAA or descending dissection, ACS, kidney stone. Will get labs, treat symptomatically and obtain serial abdominal exams to determine if additional imaging is indicated. Will reassess and monitor closely. ED Course:   Initial assessment performed. The patients presenting problems have been discussed, and they are in agreement with the care plan formulated and outlined with them. I have encouraged them to ask questions as they arise throughout their visit. HYPERTENSION COUNSELING   Education was provided to the patient today regarding their hypertension. Patient is made aware of their elevated blood pressure and is instructed to follow up this week with their Primary Care for a recheck. Patient is counseled regarding consequences of chronic, uncontrolled hypertension including kidney disease, heart disease, stroke or even death. Patient states their understanding and agrees to follow up this week. Additionally, during their visit, I discussed sodium restriction, maintaining ideal body weight and regular exercise program as physiologic means to achieve blood pressure control. The patient will strive towards this. I reviewed our electronic medical record system for any past medical records that were available that may contribute to the patient's current condition, the nursing notes and vital signs from today's visit. Edwardo Neves MD    Medications Administered During ED Course:  Medications   ondansetron [facility]) injection 4 mg (4 mg IntraVENous Given 7/2/19 1253)   ketorolac (TORADOL) injection 15 mg (15 mg IntraVENous Given 7/2/19 1253)     Progress Note  I have re-examined the patient. she feels much better and symptoms improved. Tolerating oral intake. Abdomen is soft and without guarding, rebound or other peritoneal signs.  I have discussed with patient the importance of close f/u and to return to the ED if symptoms don't improve or worsen. Progress Note  Results reviewed with patient; patient does have a exophytic right lower renal lesion. Patient is advised to follow-up with a renal ultrasound and follow with primary care doctor for this. Progress Note:  Patient has been reassessed and reports feeling better and symptoms have improved after ED treatment. Newton Garcia is able to tolerate PO and ambulate per baseline. Eliecer Hurst's final labs and imaging have been reviewed with her. She has been counseled regarding her diagnosis. She verbally conveys understanding and agreement of the signs, symptoms, diagnosis, treatment and prognosis and additionally agrees to follow up as recommended with Dr. None in 24 - 48 hours. She also agrees with the care-plan and conveys that all of her questions have been answered. I have also put together some discharge instructions for her that include: 1) educational information regarding their diagnosis, 2) how to care for their diagnosis at home, as well a 3) list of reasons why they would want to return to the ED prior to their follow-up appointment, should their condition change. I have answered all questions to the patient's satisfaction. Strict return precautions given. She both understood and agreed with plan as discussed above. Vital signs stable for discharge. Disposition: DISCHARGE     The pt is ready for discharge. The pt's signs, symptoms, diagnosis, and discharge instructions have been discussed and pt has conveyed their understanding. The pt is to follow up as recommended or return to ER should their symptoms worsen. Plan has been discussed and pt is in agreement. PLAN:  1. Discharge Medication List as of 7/2/2019  2:49 PM      CONTINUE these medications which have NOT CHANGED    Details   multivitamin (ONE A DAY) tablet Take 1 tablet by mouth daily. , Historical Med      omeprazole (PRILOSEC) 20 mg capsule TAKE ONE CAPSULE BY MOUTH DAILY, NormalNeeds appointmentDisp-30 Cap, R-0      KLOR-CON M20 20 mEq tablet TAKE ONE TABLET BY MOUTH EVERY DAY, Normal, Disp-30 Tab, R-4      rosuvastatin (CRESTOR) 10 mg tablet Take 1 Tab by mouth daily. Normal, 10 mg, Disp-30 Tab, R-4      lisinopril (PRINIVIL, ZESTRIL) 10 mg tablet Take 1 Tab by mouth daily. Normal, 10 mg, Disp-30 Tab, R-1      polyethylene glycol (MIRALAX) 17 gram/dose powder Take 17 g by mouth two (2) times a day. Normal, 17 g, Disp-510 g, R-3      acetaminophen (TYLENOL EXTRA STRENGTH) 500 mg tablet Take  by mouth every six (6) hours as needed. Historical Med      butalbital-acetaminophen-caffeine (FIORICET, ESGIC) -40 mg per tablet Take 1 Tab by mouth every six (6) hours as needed for Pain. Normal, 1 Tab, Disp-30 Tab, R-0      aspirin (ASPIRIN) 325 mg tablet Take 325 mg by mouth daily. Historical Med, 325 mg           2. Follow-up Information     Follow up With Specialties Details Why Contact Info    None    None ([phone]) Patient stated that they have no PCP      [facility] EMERGENCY DEPT Emergency Medicine  As needed, If symptoms worsen [address] [facility] [id number]  [address] [place] [address] [facility]  Schedule an appointment as soon as possible for a visit in 1 day  [address]          Return to ED if worse  Diagnosis     Clinical Impression:   1. Kidney lesion    2. Acute right flank pain    3. Acute vomiting    4. Accelerated hypertension        Attestation:  I personally performed the services described in this documentation on this date 7/2/2019 for patient, Heaven Monte. Ramiro Marinelli MD    Please note that this dictation was completed with [employer], the [employer] voice recognition software. Quite often unanticipated grammatical, syntax, homophones, and other interpretive errors are inadvertently transcribed by the computer software. Please disregard these errors. Please excuse any errors that have escaped final proofreading.       This note will not be viewable in [address] fall

## (undated) DEVICE — Device

## (undated) DEVICE — DRAPE EQUIP C ARM 74X42 IN MOB XR W/ TIE RUBBER BND LF

## (undated) DEVICE — SYR 3ML LL TIP 1/10ML GRAD --

## (undated) DEVICE — REM POLYHESIVE ADULT PATIENT RETURN ELECTRODE: Brand: VALLEYLAB

## (undated) DEVICE — BIT DRL L270MM DIA3.8MM 3 FLUT QUIK CPL NONRADIOLUCENT W/O

## (undated) DEVICE — 3M™ TEGADERM™ TRANSPARENT FILM DRESSING FRAME STYLE, 1627, 4 IN X 10 IN (10 CM X 25 CM), 20/CT 4CT/CASE: Brand: 3M™ TEGADERM™

## (undated) DEVICE — TOWEL 4 PLY TISS 19X30 SUE WHT

## (undated) DEVICE — APPLICATOR MEDICATED 26 CC SOLUTION CLR STRL CHLORAPREP

## (undated) DEVICE — Device: Brand: JELCO

## (undated) DEVICE — BNDG ELAS HK LOOP 4X5YD NS -- MATRIX

## (undated) DEVICE — BNDG ELAS HK LOOP 6X5YD NS -- MATRIX

## (undated) DEVICE — SUT VCRL + 2-0 36IN CT1 UD --

## (undated) DEVICE — K WIRE FIX L285MM DIA2.5MM S STL W/ TRCR PNT
Type: IMPLANTABLE DEVICE | Site: HUMERUS | Status: NON-FUNCTIONAL
Removed: 2022-01-20

## (undated) DEVICE — BASIC SINGLE BASIN-LF: Brand: MEDLINE INDUSTRIES, INC.

## (undated) DEVICE — SUTURE VCRL SZ 0 L27IN ABSRB UD L36MM CT-1 1/2 CIR J260H

## (undated) DEVICE — SYR 10ML LUER LOK 1/5ML GRAD --

## (undated) DEVICE — BASIN EMSIS 16OZ GRAPHITE PLAS KID SHP MOLD GRAD FOR ORAL

## (undated) DEVICE — Z DISCONTINUED PER MEDLINE LINE GAS SAMPLING O2/CO2 LNG AD 13 FT NSL W/ TBNG FILTERLINE

## (undated) DEVICE — SOLIDIFIER MEDC 1200ML -- CONVERT TO 356117

## (undated) DEVICE — GLOVE ORANGE PI 7 1/2   MSG9075

## (undated) DEVICE — ROD RMR L950MM DIA2.5MM W/ EXTN BALL TIP

## (undated) DEVICE — SHOULDER STABILIZATION KIT,                                    DISPOSABLE 12 PER BOX

## (undated) DEVICE — PADDING CST 4IN STERILE --

## (undated) DEVICE — 1200 GUARD II KIT W/5MM TUBE W/O VAC TUBE: Brand: GUARDIAN

## (undated) DEVICE — PADDING CST 4INX4YD --

## (undated) DEVICE — TOTAL TRAY, 16FR 10ML SIL FOLEY, URN: Brand: MEDLINE

## (undated) DEVICE — BIT DRL L145MM DIA3.2MM 3 FLUT QUIK CPL FOR EXPERT TIB

## (undated) DEVICE — GLOVE SURG SZ 75 L12IN FNGR THK79MIL GRN LTX FREE

## (undated) DEVICE — INTENDED FOR TISSUE SEPARATION, AND OTHER PROCEDURES THAT REQUIRE A SHARP SURGICAL BLADE TO PUNCTURE OR CUT.: Brand: BARD-PARKER ® CARBON RIB-BACK BLADES

## (undated) DEVICE — TRAP SPEC COLL POLYP POLYSTYR --

## (undated) DEVICE — POUCH FLD 36X29IN SHLDR HVY LO GLARE MATTE FINISH FLM 2 SUCT

## (undated) DEVICE — DRESSING,GAUZE,XEROFORM,CURAD,5"X9",ST: Brand: CURAD

## (undated) DEVICE — NEONATAL-ADULT SPO2 SENSOR: Brand: NELLCOR

## (undated) DEVICE — SET ADMIN 16ML TBNG L100IN 2 Y INJ SITE IV PIGGY BK DISP

## (undated) DEVICE — GARMENT,MEDLINE,DVT,INT,CALF,MED, GEN2: Brand: MEDLINE

## (undated) DEVICE — MAX-CORE® DISPOSABLE CORE BIOPSY INSTRUMENT, 14G X 10CM: Brand: MAX-CORE

## (undated) DEVICE — MAJOR ORTHO PROCEDURE PACK: Brand: MEDLINE INDUSTRIES, INC.

## (undated) DEVICE — NON-REM POLYHESIVE PATIENT RETURN ELECTRODE: Brand: VALLEYLAB

## (undated) DEVICE — CONTAINER SPEC 20 ML LID NEUT BUFF FORMALIN 10 % POLYPR STS

## (undated) DEVICE — BANDAGE,GAUZE,BULKEE II,4.5"X4.1YD,STRL: Brand: MEDLINE

## (undated) DEVICE — GLOVE SURG SZ 8 L12IN FNGR THK79MIL GRN LTX FREE

## (undated) DEVICE — ELECTRODE,RADIOTRANSLUCENT,FOAM,5PK: Brand: MEDLINE

## (undated) DEVICE — NEEDLE HYPO 18GA L1.5IN PNK S STL HUB POLYPR SHLD REG BVL

## (undated) DEVICE — SOUTHSIDE TURNOVER: Brand: MEDLINE INDUSTRIES, INC.

## (undated) DEVICE — CATH IV AUTOGRD BC PNK 20GA 25 -- INSYTE

## (undated) DEVICE — COVER,TABLE,HEAVY DUTY,79"X110",STRL: Brand: MEDLINE

## (undated) DEVICE — SNARE ENDOSCP M L240CM W27MM SHTH DIA2.4MM CHN 2.8MM OVL